# Patient Record
Sex: FEMALE | Race: WHITE | NOT HISPANIC OR LATINO | Employment: OTHER | URBAN - NONMETROPOLITAN AREA
[De-identification: names, ages, dates, MRNs, and addresses within clinical notes are randomized per-mention and may not be internally consistent; named-entity substitution may affect disease eponyms.]

---

## 2017-10-15 ENCOUNTER — APPOINTMENT (OUTPATIENT)
Dept: CT IMAGING | Facility: HOSPITAL | Age: 82
End: 2017-10-15

## 2017-10-15 ENCOUNTER — HOSPITAL ENCOUNTER (EMERGENCY)
Facility: HOSPITAL | Age: 82
Discharge: HOME | End: 2017-10-15
Attending: EMERGENCY MEDICINE | Admitting: FAMILY MEDICINE

## 2017-10-15 ENCOUNTER — APPOINTMENT (OUTPATIENT)
Dept: GENERAL RADIOLOGY | Facility: HOSPITAL | Age: 82
End: 2017-10-15

## 2017-10-15 VITALS
DIASTOLIC BLOOD PRESSURE: 83 MMHG | HEART RATE: 107 BPM | HEIGHT: 63 IN | SYSTOLIC BLOOD PRESSURE: 116 MMHG | WEIGHT: 119 LBS | TEMPERATURE: 97.6 F | BODY MASS INDEX: 21.09 KG/M2 | RESPIRATION RATE: 18 BRPM | OXYGEN SATURATION: 97 %
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
## 2017-10-15 DIAGNOSIS — M25.559 ARTHRALGIA OF HIP, UNSPECIFIED LATERALITY: Primary | ICD-10-CM

## 2017-10-15 DIAGNOSIS — I10 UNCONTROLLED HYPERTENSION: ICD-10-CM

## 2017-10-15 LAB
ALBUMIN SERPL-MCNC: 4.2 G/DL (ref 3.4–4.8)
ALBUMIN/GLOB SERPL: 1.6 G/DL (ref 1.5–2.5)
ALP SERPL-CCNC: 91 U/L (ref 35–104)
ALT SERPL W P-5'-P-CCNC: 29 U/L (ref 10–36)
ANION GAP SERPL CALCULATED.3IONS-SCNC: 4.4 MMOL/L (ref 3.6–11.2)
AST SERPL-CCNC: 29 U/L (ref 10–30)
BACTERIA UR QL AUTO: ABNORMAL /HPF
BASOPHILS # BLD AUTO: 0.02 10*3/MM3 (ref 0–0.3)
BASOPHILS NFR BLD AUTO: 0.2 % (ref 0–2)
BILIRUB SERPL-MCNC: 0.3 MG/DL (ref 0.2–1.8)
BILIRUB UR QL STRIP: NEGATIVE
BUN BLD-MCNC: 25 MG/DL (ref 7–21)
BUN/CREAT SERPL: 19.8 (ref 7–25)
CALCIUM SPEC-SCNC: 9.5 MG/DL (ref 7.7–10)
CHLORIDE SERPL-SCNC: 108 MMOL/L (ref 99–112)
CLARITY UR: CLEAR
CO2 SERPL-SCNC: 29.6 MMOL/L (ref 24.3–31.9)
COLOR UR: YELLOW
CREAT BLD-MCNC: 1.26 MG/DL (ref 0.43–1.29)
DEPRECATED RDW RBC AUTO: 44.7 FL (ref 37–54)
EOSINOPHIL # BLD AUTO: 0.94 10*3/MM3 (ref 0–0.7)
EOSINOPHIL NFR BLD AUTO: 9.6 % (ref 0–7)
ERYTHROCYTE [DISTWIDTH] IN BLOOD BY AUTOMATED COUNT: 13.5 % (ref 11.5–14.5)
GFR SERPL CREATININE-BSD FRML MDRD: 40 ML/MIN/1.73
GLOBULIN UR ELPH-MCNC: 2.7 GM/DL
GLUCOSE BLD-MCNC: 90 MG/DL (ref 70–110)
GLUCOSE UR STRIP-MCNC: NEGATIVE MG/DL
HCT VFR BLD AUTO: 41.2 % (ref 37–47)
HGB BLD-MCNC: 13.3 G/DL (ref 12–16)
HGB UR QL STRIP.AUTO: ABNORMAL
HYALINE CASTS UR QL AUTO: ABNORMAL /LPF
IMM GRANULOCYTES # BLD: 0.02 10*3/MM3 (ref 0–0.03)
IMM GRANULOCYTES NFR BLD: 0.2 % (ref 0–0.5)
KETONES UR QL STRIP: NEGATIVE
LEUKOCYTE ESTERASE UR QL STRIP.AUTO: ABNORMAL
LYMPHOCYTES # BLD AUTO: 0.88 10*3/MM3 (ref 1–3)
LYMPHOCYTES NFR BLD AUTO: 8.9 % (ref 16–46)
MCH RBC QN AUTO: 30.1 PG (ref 27–33)
MCHC RBC AUTO-ENTMCNC: 32.3 G/DL (ref 33–37)
MCV RBC AUTO: 93.2 FL (ref 80–94)
MONOCYTES # BLD AUTO: 0.46 10*3/MM3 (ref 0.1–0.9)
MONOCYTES NFR BLD AUTO: 4.7 % (ref 0–12)
NEUTROPHILS # BLD AUTO: 7.52 10*3/MM3 (ref 1.4–6.5)
NEUTROPHILS NFR BLD AUTO: 76.4 % (ref 40–75)
NITRITE UR QL STRIP: NEGATIVE
OSMOLALITY SERPL CALC.SUM OF ELEC: 287 MOSM/KG (ref 273–305)
PH UR STRIP.AUTO: 8.5 [PH] (ref 5–8)
PLATELET # BLD AUTO: 177 10*3/MM3 (ref 130–400)
PMV BLD AUTO: 10.7 FL (ref 6–10)
POTASSIUM BLD-SCNC: 4.6 MMOL/L (ref 3.5–5.3)
PROT SERPL-MCNC: 6.9 G/DL (ref 6–8)
PROT UR QL STRIP: ABNORMAL
RBC # BLD AUTO: 4.42 10*6/MM3 (ref 4.2–5.4)
RBC # UR: ABNORMAL /HPF
REF LAB TEST METHOD: ABNORMAL
SODIUM BLD-SCNC: 142 MMOL/L (ref 135–153)
SP GR UR STRIP: 1.01 (ref 1–1.03)
SQUAMOUS #/AREA URNS HPF: ABNORMAL /HPF
TROPONIN I SERPL-MCNC: <0.006 NG/ML
TROPONIN I SERPL-MCNC: <0.006 NG/ML
UROBILINOGEN UR QL STRIP: ABNORMAL
WBC NRBC COR # BLD: 9.84 10*3/MM3 (ref 4.5–12.5)
WBC UR QL AUTO: ABNORMAL /HPF

## 2017-10-15 PROCEDURE — 96375 TX/PRO/DX INJ NEW DRUG ADDON: CPT

## 2017-10-15 PROCEDURE — 93005 ELECTROCARDIOGRAM TRACING: CPT | Performed by: FAMILY MEDICINE

## 2017-10-15 PROCEDURE — 93010 ELECTROCARDIOGRAM REPORT: CPT | Performed by: INTERNAL MEDICINE

## 2017-10-15 PROCEDURE — 99285 EMERGENCY DEPT VISIT HI MDM: CPT

## 2017-10-15 PROCEDURE — 71010 HC CHEST PA OR AP: CPT

## 2017-10-15 PROCEDURE — 73523 X-RAY EXAM HIPS BI 5/> VIEWS: CPT | Performed by: RADIOLOGY

## 2017-10-15 PROCEDURE — 93005 ELECTROCARDIOGRAM TRACING: CPT | Performed by: EMERGENCY MEDICINE

## 2017-10-15 PROCEDURE — 85025 COMPLETE CBC W/AUTO DIFF WBC: CPT | Performed by: EMERGENCY MEDICINE

## 2017-10-15 PROCEDURE — 73552 X-RAY EXAM OF FEMUR 2/>: CPT

## 2017-10-15 PROCEDURE — 73523 X-RAY EXAM HIPS BI 5/> VIEWS: CPT

## 2017-10-15 PROCEDURE — 96361 HYDRATE IV INFUSION ADD-ON: CPT

## 2017-10-15 PROCEDURE — 25010000002 MORPHINE PER 10 MG: Performed by: FAMILY MEDICINE

## 2017-10-15 PROCEDURE — 73080 X-RAY EXAM OF ELBOW: CPT

## 2017-10-15 PROCEDURE — 70450 CT HEAD/BRAIN W/O DYE: CPT | Performed by: RADIOLOGY

## 2017-10-15 PROCEDURE — 36415 COLL VENOUS BLD VENIPUNCTURE: CPT

## 2017-10-15 PROCEDURE — 71010 XR CHEST 1 VW: CPT | Performed by: RADIOLOGY

## 2017-10-15 PROCEDURE — 81001 URINALYSIS AUTO W/SCOPE: CPT | Performed by: EMERGENCY MEDICINE

## 2017-10-15 PROCEDURE — 70450 CT HEAD/BRAIN W/O DYE: CPT

## 2017-10-15 PROCEDURE — 80053 COMPREHEN METABOLIC PANEL: CPT | Performed by: EMERGENCY MEDICINE

## 2017-10-15 PROCEDURE — 73080 X-RAY EXAM OF ELBOW: CPT | Performed by: RADIOLOGY

## 2017-10-15 PROCEDURE — 96374 THER/PROPH/DIAG INJ IV PUSH: CPT

## 2017-10-15 PROCEDURE — 73552 X-RAY EXAM OF FEMUR 2/>: CPT | Performed by: RADIOLOGY

## 2017-10-15 PROCEDURE — 25010000002 HYDRALAZINE PER 20 MG

## 2017-10-15 PROCEDURE — 25010000002 ONDANSETRON PER 1 MG: Performed by: FAMILY MEDICINE

## 2017-10-15 PROCEDURE — 84484 ASSAY OF TROPONIN QUANT: CPT | Performed by: EMERGENCY MEDICINE

## 2017-10-15 PROCEDURE — 84484 ASSAY OF TROPONIN QUANT: CPT | Performed by: FAMILY MEDICINE

## 2017-10-15 RX ORDER — MORPHINE SULFATE 10 MG/ML
INJECTION, SOLUTION INTRAMUSCULAR; INTRAVENOUS
Status: DISCONTINUED
Start: 2017-10-15 | End: 2017-10-16 | Stop reason: HOSPADM

## 2017-10-15 RX ORDER — LISINOPRIL AND HYDROCHLOROTHIAZIDE 20; 12.5 MG/1; MG/1
1 TABLET ORAL DAILY
COMMUNITY
End: 2022-12-13

## 2017-10-15 RX ORDER — SODIUM CHLORIDE 9 MG/ML
INJECTION, SOLUTION INTRAVENOUS
Status: COMPLETED
Start: 2017-10-15 | End: 2017-10-15

## 2017-10-15 RX ORDER — HYDRALAZINE HYDROCHLORIDE 20 MG/ML
20 INJECTION INTRAMUSCULAR; INTRAVENOUS ONCE
Status: COMPLETED | OUTPATIENT
Start: 2017-10-15 | End: 2017-10-15

## 2017-10-15 RX ORDER — ONDANSETRON 4 MG/1
4 TABLET, ORALLY DISINTEGRATING ORAL ONCE
Status: COMPLETED | OUTPATIENT
Start: 2017-10-15 | End: 2017-10-15

## 2017-10-15 RX ORDER — ONDANSETRON 4 MG/1
4 TABLET, FILM COATED ORAL EVERY 6 HOURS
Qty: 10 TABLET | Refills: 0 | Status: ON HOLD | OUTPATIENT
Start: 2017-10-15 | End: 2020-09-04

## 2017-10-15 RX ORDER — HYDROCODONE BITARTRATE AND ACETAMINOPHEN 5; 325 MG/1; MG/1
1 TABLET ORAL EVERY 6 HOURS PRN
Status: DISCONTINUED | OUTPATIENT
Start: 2017-10-15 | End: 2017-10-16 | Stop reason: HOSPADM

## 2017-10-15 RX ORDER — ENALAPRILAT 2.5 MG/2ML
INJECTION INTRAVENOUS
Status: COMPLETED
Start: 2017-10-15 | End: 2017-10-15

## 2017-10-15 RX ORDER — SODIUM CHLORIDE 0.9 % (FLUSH) 0.9 %
10 SYRINGE (ML) INJECTION AS NEEDED
Status: DISCONTINUED | OUTPATIENT
Start: 2017-10-15 | End: 2017-10-16 | Stop reason: HOSPADM

## 2017-10-15 RX ORDER — MORPHINE SULFATE 2 MG/ML
1 INJECTION, SOLUTION INTRAMUSCULAR; INTRAVENOUS ONCE
Status: COMPLETED | OUTPATIENT
Start: 2017-10-15 | End: 2017-10-15

## 2017-10-15 RX ORDER — ENALAPRILAT 2.5 MG/2ML
1.25 INJECTION INTRAVENOUS ONCE
Status: COMPLETED | OUTPATIENT
Start: 2017-10-15 | End: 2017-10-15

## 2017-10-15 RX ORDER — SODIUM CHLORIDE 9 MG/ML
INJECTION, SOLUTION INTRAVENOUS
Status: DISCONTINUED
Start: 2017-10-15 | End: 2017-10-16 | Stop reason: HOSPADM

## 2017-10-15 RX ORDER — HYDROCODONE BITARTRATE AND ACETAMINOPHEN 5; 325 MG/1; MG/1
1 TABLET ORAL EVERY 6 HOURS PRN
Qty: 12 TABLET | Refills: 0 | Status: ON HOLD | OUTPATIENT
Start: 2017-10-15 | End: 2020-09-04

## 2017-10-15 RX ORDER — ONDANSETRON 2 MG/ML
4 INJECTION INTRAMUSCULAR; INTRAVENOUS ONCE
Status: COMPLETED | OUTPATIENT
Start: 2017-10-15 | End: 2017-10-15

## 2017-10-15 RX ORDER — HYDRALAZINE HYDROCHLORIDE 20 MG/ML
INJECTION INTRAMUSCULAR; INTRAVENOUS
Status: COMPLETED
Start: 2017-10-15 | End: 2017-10-15

## 2017-10-15 RX ADMIN — HYDRALAZINE HYDROCHLORIDE 20 MG: 20 INJECTION INTRAMUSCULAR; INTRAVENOUS at 20:16

## 2017-10-15 RX ADMIN — ENALAPRILAT 1.25 MG: 1.25 INJECTION INTRAVENOUS at 19:23

## 2017-10-15 RX ADMIN — SODIUM CHLORIDE 1000 ML: 9 INJECTION, SOLUTION INTRAVENOUS at 21:05

## 2017-10-15 RX ADMIN — ONDANSETRON 4 MG: 4 TABLET, ORALLY DISINTEGRATING ORAL at 23:10

## 2017-10-15 RX ADMIN — ENALAPRILAT 1.25 MG: 2.5 INJECTION INTRAVENOUS at 19:23

## 2017-10-15 RX ADMIN — ONDANSETRON 4 MG: 2 INJECTION INTRAMUSCULAR; INTRAVENOUS at 20:33

## 2017-10-15 RX ADMIN — SODIUM CHLORIDE 1000 ML: 9 INJECTION, SOLUTION INTRAVENOUS at 21:01

## 2017-10-15 RX ADMIN — MORPHINE SULFATE 1 MG: 2 INJECTION, SOLUTION INTRAMUSCULAR; INTRAVENOUS at 20:32

## 2017-10-15 RX ADMIN — HYDROCODONE BITARTRATE AND ACETAMINOPHEN 1 TABLET: 5; 325 TABLET ORAL at 23:10

## 2017-10-15 NOTE — ED NOTES
I went in the patient's room to obtain her labs but she is not in her room at this time.     Curtis Serrano  10/15/17 [id number]

## 2017-10-15 NOTE — ED NOTES
Placed pt on bed pan at this time. Pt voided small amt of yellow urine. Pt tolerated well and denies any pain.     Shoshana Lucas RN  10/15/17 1946

## 2017-10-15 NOTE — ED PROVIDER NOTES
"Subjective   HPI Comments: Patient is a 89-year-old white female who states that she tripped on a rug and fell today, injuring the right side of her head and injuring her right hip.  She reports that she chronically has problems and pain from her left hip, but that it has not injured today.  She reports a mild injury to her right elbow, causing only mild discomfort.  She denies loss of consciousness, but states that she \"saw stars for a little while\".  She denies neck pain, back pain, chest pain, shortness of breath, abdominal pain, any other symptoms, any other injuries or complaints.  She reports that she has not had her blood pressure medicine today, but that she did take it yesterday.    Patient is a 89 y.o. female presenting with fall.   History provided by:  Patient  Fall       Review of Systems    Past Medical History:   Diagnosis Date   • Hip pain, acute, left    • Hypertension        No Known Allergies    History reviewed. No pertinent surgical history.    History reviewed. No pertinent family history.    Social History     Social History   • Marital status:      Spouse name: N/A   • Number of children: N/A   • Years of education: N/A     Social History Main Topics   • Smoking status: Never Smoker   • Smokeless tobacco: Never Used   • Alcohol use No   • Drug use: No   • Sexual activity: Defer     Other Topics Concern   • None     Social History Narrative   • None           Objective   Physical Exam    Procedures         ED Course  ED Course                  MDM  Number of Diagnoses or Management Options  Arthralgia of hip, unspecified laterality: new and does not require workup  Fall, initial encounter: new and does not require workup  Uncontrolled hypertension: new and does not require workup     Amount and/or Complexity of Data Reviewed  Clinical lab tests: ordered and reviewed  Tests in the radiology section of CPT®: ordered and reviewed  Tests in the medicine section of CPT®: reviewed and " ordered  Independent visualization of images, tracings, or specimens: yes    Risk of Complications, Morbidity, and/or Mortality  Presenting problems: moderate  Diagnostic procedures: moderate  Management options: moderate    Patient Progress  Patient progress: stable      Final diagnoses:   Arthralgia of hip, unspecified laterality   Fall, initial encounter   Uncontrolled hypertension            Romelia Youngblood DO  10/16/17 [id number]

## 2017-10-16 NOTE — ED NOTES
Repositioned pt for comfort at this time and provided warm blanket as requested by pt.      Shoshana Lucas RN  10/15/17 2043

## 2020-09-04 ENCOUNTER — APPOINTMENT (OUTPATIENT)
Dept: CT IMAGING | Facility: HOSPITAL | Age: 85
End: 2020-09-04

## 2020-09-04 ENCOUNTER — APPOINTMENT (OUTPATIENT)
Dept: MRI IMAGING | Facility: HOSPITAL | Age: 85
End: 2020-09-04

## 2020-09-04 ENCOUNTER — APPOINTMENT (OUTPATIENT)
Dept: ULTRASOUND IMAGING | Facility: HOSPITAL | Age: 85
End: 2020-09-04

## 2020-09-04 ENCOUNTER — HOSPITAL ENCOUNTER (INPATIENT)
Facility: HOSPITAL | Age: 85
LOS: 3 days | Discharge: HOME | End: 2020-09-07
Attending: EMERGENCY MEDICINE | Admitting: INTERNAL MEDICINE

## 2020-09-04 DIAGNOSIS — K85.90 ACUTE PANCREATITIS, UNSPECIFIED COMPLICATION STATUS, UNSPECIFIED PANCREATITIS TYPE: Primary | ICD-10-CM

## 2020-09-04 DIAGNOSIS — N28.89 RENAL MASS: ICD-10-CM

## 2020-09-04 LAB
6-ACETYL MORPHINE: NEGATIVE
ALBUMIN SERPL-MCNC: 3.87 G/DL (ref 3.5–5.2)
ALBUMIN/GLOB SERPL: 1.3 G/DL
ALP SERPL-CCNC: 229 U/L (ref 39–117)
ALT SERPL W P-5'-P-CCNC: 235 U/L (ref 1–33)
AMPHET+METHAMPHET UR QL: NEGATIVE
AMYLASE SERPL-CCNC: 3156 U/L (ref 28–100)
ANION GAP SERPL CALCULATED.3IONS-SCNC: 11.3 MMOL/L (ref 5–15)
APAP SERPL-MCNC: <5 MCG/ML (ref 10–30)
AST SERPL-CCNC: 749 U/L (ref 1–32)
BACTERIA UR QL AUTO: NORMAL /HPF
BARBITURATES UR QL SCN: NEGATIVE
BASOPHILS # BLD AUTO: 0.03 10*3/MM3 (ref 0–0.2)
BASOPHILS NFR BLD AUTO: 0.2 % (ref 0–1.5)
BENZODIAZ UR QL SCN: NEGATIVE
BILIRUB SERPL-MCNC: 0.5 MG/DL (ref 0–1.2)
BILIRUB UR QL STRIP: NEGATIVE
BUN SERPL-MCNC: 24 MG/DL (ref 8–23)
BUN/CREAT SERPL: 22.4 (ref 7–25)
BUPRENORPHINE SERPL-MCNC: NEGATIVE NG/ML
CALCIUM SPEC-SCNC: 9.8 MG/DL (ref 8.2–9.6)
CANNABINOIDS SERPL QL: NEGATIVE
CHLORIDE SERPL-SCNC: 102 MMOL/L (ref 98–107)
CK SERPL-CCNC: 43 U/L (ref 20–180)
CLARITY UR: CLEAR
CO2 SERPL-SCNC: 27.7 MMOL/L (ref 22–29)
COCAINE UR QL: NEGATIVE
COLOR UR: YELLOW
CREAT SERPL-MCNC: 1.07 MG/DL (ref 0.57–1)
CRP SERPL-MCNC: 0.14 MG/DL (ref 0–0.5)
D-LACTATE SERPL-SCNC: 1 MMOL/L (ref 0.5–2)
DEPRECATED RDW RBC AUTO: 44.8 FL (ref 37–54)
EOSINOPHIL # BLD AUTO: 0.36 10*3/MM3 (ref 0–0.4)
EOSINOPHIL NFR BLD AUTO: 3 % (ref 0.3–6.2)
ERYTHROCYTE [DISTWIDTH] IN BLOOD BY AUTOMATED COUNT: 13.2 % (ref 12.3–15.4)
ERYTHROCYTE [SEDIMENTATION RATE] IN BLOOD: 17 MM/HR (ref 0–30)
ETHANOL BLD-MCNC: <10 MG/DL (ref 0–10)
ETHANOL UR QL: <0.01 %
GFR SERPL CREATININE-BSD FRML MDRD: 48 ML/MIN/1.73
GLOBULIN UR ELPH-MCNC: 2.9 GM/DL
GLUCOSE BLDC GLUCOMTR-MCNC: 97 MG/DL (ref 70–130)
GLUCOSE SERPL-MCNC: 127 MG/DL (ref 65–99)
GLUCOSE UR STRIP-MCNC: NEGATIVE MG/DL
HAV IGM SERPL QL IA: NORMAL
HBA1C MFR BLD: 5.5 % (ref 4.8–5.6)
HBV CORE IGM SERPL QL IA: NORMAL
HBV SURFACE AG SERPL QL IA: NORMAL
HCT VFR BLD AUTO: 41.4 % (ref 34–46.6)
HCV AB SER DONR QL: NORMAL
HGB BLD-MCNC: 13.1 G/DL (ref 12–15.9)
HGB UR QL STRIP.AUTO: NEGATIVE
HOLD SPECIMEN: NORMAL
HYALINE CASTS UR QL AUTO: NORMAL /LPF
IMM GRANULOCYTES # BLD AUTO: 0.05 10*3/MM3 (ref 0–0.05)
IMM GRANULOCYTES NFR BLD AUTO: 0.4 % (ref 0–0.5)
INR PPP: 0.96 (ref 0.9–1.1)
KETONES UR QL STRIP: NEGATIVE
LEUKOCYTE ESTERASE UR QL STRIP.AUTO: ABNORMAL
LIPASE SERPL-CCNC: >3000 U/L (ref 13–60)
LYMPHOCYTES # BLD AUTO: 0.86 10*3/MM3 (ref 0.7–3.1)
LYMPHOCYTES NFR BLD AUTO: 7.1 % (ref 19.6–45.3)
MAGNESIUM SERPL-MCNC: 2.3 MG/DL (ref 1.7–2.3)
MCH RBC QN AUTO: 29.2 PG (ref 26.6–33)
MCHC RBC AUTO-ENTMCNC: 31.6 G/DL (ref 31.5–35.7)
MCV RBC AUTO: 92.4 FL (ref 79–97)
METHADONE UR QL SCN: NEGATIVE
MONOCYTES # BLD AUTO: 0.44 10*3/MM3 (ref 0.1–0.9)
MONOCYTES NFR BLD AUTO: 3.6 % (ref 5–12)
NEUTROPHILS NFR BLD AUTO: 10.36 10*3/MM3 (ref 1.7–7)
NEUTROPHILS NFR BLD AUTO: 85.7 % (ref 42.7–76)
NITRITE UR QL STRIP: NEGATIVE
NRBC BLD AUTO-RTO: 0 /100 WBC (ref 0–0.2)
NT-PROBNP SERPL-MCNC: 736.2 PG/ML (ref 0–1800)
OPIATES UR QL: NEGATIVE
OXYCODONE UR QL SCN: NEGATIVE
PCP UR QL SCN: NEGATIVE
PH UR STRIP.AUTO: 7.5 [PH] (ref 5–8)
PHOSPHATE SERPL-MCNC: 3.2 MG/DL (ref 2.5–4.5)
PLATELET # BLD AUTO: 205 10*3/MM3 (ref 140–450)
PMV BLD AUTO: 10.3 FL (ref 6–12)
POTASSIUM SERPL-SCNC: 4.5 MMOL/L (ref 3.5–5.2)
PROCALCITONIN SERPL-MCNC: 0.05 NG/ML (ref 0–0.25)
PROT SERPL-MCNC: 6.8 G/DL (ref 6–8.5)
PROT UR QL STRIP: ABNORMAL
PROTHROMBIN TIME: 12.6 SECONDS (ref 11.9–14.1)
RBC # BLD AUTO: 4.48 10*6/MM3 (ref 3.77–5.28)
RBC # UR: NORMAL /HPF
REF LAB TEST METHOD: NORMAL
SODIUM SERPL-SCNC: 141 MMOL/L (ref 136–145)
SP GR UR STRIP: 1.02 (ref 1–1.03)
SQUAMOUS #/AREA URNS HPF: NORMAL /HPF
TRIGL SERPL-MCNC: 76 MG/DL (ref 0–150)
TROPONIN T SERPL-MCNC: <0.01 NG/ML (ref 0–0.03)
TSH SERPL DL<=0.05 MIU/L-ACNC: 9.25 UIU/ML (ref 0.27–4.2)
UROBILINOGEN UR QL STRIP: ABNORMAL
WBC # BLD AUTO: 12.1 10*3/MM3 (ref 3.4–10.8)
WBC UR QL AUTO: NORMAL /HPF

## 2020-09-04 PROCEDURE — 25010000002 HEPARIN (PORCINE) PER 1000 UNITS: Performed by: INTERNAL MEDICINE

## 2020-09-04 PROCEDURE — 82150 ASSAY OF AMYLASE: CPT | Performed by: EMERGENCY MEDICINE

## 2020-09-04 PROCEDURE — 74176 CT ABD & PELVIS W/O CONTRAST: CPT

## 2020-09-04 PROCEDURE — 80307 DRUG TEST PRSMV CHEM ANLYZR: CPT | Performed by: EMERGENCY MEDICINE

## 2020-09-04 PROCEDURE — 76705 ECHO EXAM OF ABDOMEN: CPT | Performed by: RADIOLOGY

## 2020-09-04 PROCEDURE — 84145 PROCALCITONIN (PCT): CPT | Performed by: EMERGENCY MEDICINE

## 2020-09-04 PROCEDURE — 82550 ASSAY OF CK (CPK): CPT | Performed by: EMERGENCY MEDICINE

## 2020-09-04 PROCEDURE — 86644 CMV ANTIBODY: CPT | Performed by: INTERNAL MEDICINE

## 2020-09-04 PROCEDURE — 80074 ACUTE HEPATITIS PANEL: CPT | Performed by: EMERGENCY MEDICINE

## 2020-09-04 PROCEDURE — 83880 ASSAY OF NATRIURETIC PEPTIDE: CPT | Performed by: EMERGENCY MEDICINE

## 2020-09-04 PROCEDURE — 85025 COMPLETE CBC W/AUTO DIFF WBC: CPT | Performed by: EMERGENCY MEDICINE

## 2020-09-04 PROCEDURE — 82787 IGG 1 2 3 OR 4 EACH: CPT | Performed by: NURSE PRACTITIONER

## 2020-09-04 PROCEDURE — 93005 ELECTROCARDIOGRAM TRACING: CPT | Performed by: EMERGENCY MEDICINE

## 2020-09-04 PROCEDURE — 86645 CMV ANTIBODY IGM: CPT | Performed by: INTERNAL MEDICINE

## 2020-09-04 PROCEDURE — 84100 ASSAY OF PHOSPHORUS: CPT | Performed by: EMERGENCY MEDICINE

## 2020-09-04 PROCEDURE — 93010 ELECTROCARDIOGRAM REPORT: CPT | Performed by: INTERNAL MEDICINE

## 2020-09-04 PROCEDURE — 25010000002 MORPHINE PER 10 MG: Performed by: EMERGENCY MEDICINE

## 2020-09-04 PROCEDURE — 85610 PROTHROMBIN TIME: CPT | Performed by: EMERGENCY MEDICINE

## 2020-09-04 PROCEDURE — 99284 EMERGENCY DEPT VISIT MOD MDM: CPT

## 2020-09-04 PROCEDURE — 76705 ECHO EXAM OF ABDOMEN: CPT

## 2020-09-04 PROCEDURE — 83690 ASSAY OF LIPASE: CPT | Performed by: EMERGENCY MEDICINE

## 2020-09-04 PROCEDURE — 82962 GLUCOSE BLOOD TEST: CPT

## 2020-09-04 PROCEDURE — 94799 UNLISTED PULMONARY SVC/PX: CPT

## 2020-09-04 PROCEDURE — 83036 HEMOGLOBIN GLYCOSYLATED A1C: CPT | Performed by: NURSE PRACTITIONER

## 2020-09-04 PROCEDURE — 71250 CT THORAX DX C-: CPT

## 2020-09-04 PROCEDURE — 84484 ASSAY OF TROPONIN QUANT: CPT | Performed by: EMERGENCY MEDICINE

## 2020-09-04 PROCEDURE — 83516 IMMUNOASSAY NONANTIBODY: CPT | Performed by: NURSE PRACTITIONER

## 2020-09-04 PROCEDURE — 99223 1ST HOSP IP/OBS HIGH 75: CPT | Performed by: NURSE PRACTITIONER

## 2020-09-04 PROCEDURE — 80307 DRUG TEST PRSMV CHEM ANLYZR: CPT | Performed by: NURSE PRACTITIONER

## 2020-09-04 PROCEDURE — 74176 CT ABD & PELVIS W/O CONTRAST: CPT | Performed by: RADIOLOGY

## 2020-09-04 PROCEDURE — 83605 ASSAY OF LACTIC ACID: CPT | Performed by: EMERGENCY MEDICINE

## 2020-09-04 PROCEDURE — 81001 URINALYSIS AUTO W/SCOPE: CPT | Performed by: EMERGENCY MEDICINE

## 2020-09-04 PROCEDURE — 86664 EPSTEIN-BARR NUCLEAR ANTIGEN: CPT | Performed by: INTERNAL MEDICINE

## 2020-09-04 PROCEDURE — 86665 EPSTEIN-BARR CAPSID VCA: CPT | Performed by: INTERNAL MEDICINE

## 2020-09-04 PROCEDURE — 84443 ASSAY THYROID STIM HORMONE: CPT | Performed by: NURSE PRACTITIONER

## 2020-09-04 PROCEDURE — 80053 COMPREHEN METABOLIC PANEL: CPT | Performed by: EMERGENCY MEDICINE

## 2020-09-04 PROCEDURE — 85652 RBC SED RATE AUTOMATED: CPT | Performed by: EMERGENCY MEDICINE

## 2020-09-04 PROCEDURE — P9612 CATHETERIZE FOR URINE SPEC: HCPCS

## 2020-09-04 PROCEDURE — 71250 CT THORAX DX C-: CPT | Performed by: RADIOLOGY

## 2020-09-04 PROCEDURE — 36415 COLL VENOUS BLD VENIPUNCTURE: CPT

## 2020-09-04 PROCEDURE — 87040 BLOOD CULTURE FOR BACTERIA: CPT | Performed by: EMERGENCY MEDICINE

## 2020-09-04 PROCEDURE — 74181 MRI ABDOMEN W/O CONTRAST: CPT | Performed by: RADIOLOGY

## 2020-09-04 PROCEDURE — 84478 ASSAY OF TRIGLYCERIDES: CPT | Performed by: NURSE PRACTITIONER

## 2020-09-04 PROCEDURE — 83735 ASSAY OF MAGNESIUM: CPT | Performed by: EMERGENCY MEDICINE

## 2020-09-04 PROCEDURE — 74181 MRI ABDOMEN W/O CONTRAST: CPT

## 2020-09-04 PROCEDURE — 25010000002 ONDANSETRON PER 1 MG: Performed by: EMERGENCY MEDICINE

## 2020-09-04 PROCEDURE — 86140 C-REACTIVE PROTEIN: CPT | Performed by: EMERGENCY MEDICINE

## 2020-09-04 RX ORDER — PANTOPRAZOLE SODIUM 40 MG/10ML
40 INJECTION, POWDER, LYOPHILIZED, FOR SOLUTION INTRAVENOUS
Status: DISCONTINUED | OUTPATIENT
Start: 2020-09-04 | End: 2020-09-07

## 2020-09-04 RX ORDER — SODIUM CHLORIDE 9 MG/ML
75 INJECTION, SOLUTION INTRAVENOUS CONTINUOUS
Status: DISCONTINUED | OUTPATIENT
Start: 2020-09-04 | End: 2020-09-07 | Stop reason: HOSPADM

## 2020-09-04 RX ORDER — DEXTROSE MONOHYDRATE 25 G/50ML
25 INJECTION, SOLUTION INTRAVENOUS
Status: DISCONTINUED | OUTPATIENT
Start: 2020-09-04 | End: 2020-09-07 | Stop reason: HOSPADM

## 2020-09-04 RX ORDER — ONDANSETRON 2 MG/ML
4 INJECTION INTRAMUSCULAR; INTRAVENOUS ONCE
Status: COMPLETED | OUTPATIENT
Start: 2020-09-04 | End: 2020-09-04

## 2020-09-04 RX ORDER — SODIUM CHLORIDE 0.9 % (FLUSH) 0.9 %
10 SYRINGE (ML) INJECTION EVERY 12 HOURS SCHEDULED
Status: DISCONTINUED | OUTPATIENT
Start: 2020-09-04 | End: 2020-09-07 | Stop reason: HOSPADM

## 2020-09-04 RX ORDER — MORPHINE SULFATE 2 MG/ML
1 INJECTION, SOLUTION INTRAMUSCULAR; INTRAVENOUS
Status: DISCONTINUED | OUTPATIENT
Start: 2020-09-04 | End: 2020-09-07 | Stop reason: HOSPADM

## 2020-09-04 RX ORDER — SODIUM CHLORIDE 0.9 % (FLUSH) 0.9 %
10 SYRINGE (ML) INJECTION AS NEEDED
Status: DISCONTINUED | OUTPATIENT
Start: 2020-09-04 | End: 2020-09-07 | Stop reason: HOSPADM

## 2020-09-04 RX ORDER — HEPARIN SODIUM 5000 [USP'U]/ML
5000 INJECTION, SOLUTION INTRAVENOUS; SUBCUTANEOUS EVERY 8 HOURS SCHEDULED
Status: DISCONTINUED | OUTPATIENT
Start: 2020-09-04 | End: 2020-09-07 | Stop reason: HOSPADM

## 2020-09-04 RX ORDER — ONDANSETRON 2 MG/ML
4 INJECTION INTRAMUSCULAR; INTRAVENOUS ONCE
Status: DISCONTINUED | OUTPATIENT
Start: 2020-09-04 | End: 2020-09-07 | Stop reason: HOSPADM

## 2020-09-04 RX ORDER — NICOTINE POLACRILEX 4 MG
15 LOZENGE BUCCAL
Status: DISCONTINUED | OUTPATIENT
Start: 2020-09-04 | End: 2020-09-07 | Stop reason: HOSPADM

## 2020-09-04 RX ADMIN — SODIUM CHLORIDE 1000 ML: 9 INJECTION, SOLUTION INTRAVENOUS at 08:09

## 2020-09-04 RX ADMIN — SODIUM CHLORIDE 125 ML/HR: 9 INJECTION, SOLUTION INTRAVENOUS at 22:24

## 2020-09-04 RX ADMIN — SODIUM CHLORIDE 125 ML/HR: 9 INJECTION, SOLUTION INTRAVENOUS at 08:09

## 2020-09-04 RX ADMIN — ONDANSETRON 4 MG: 2 INJECTION INTRAMUSCULAR; INTRAVENOUS at 08:09

## 2020-09-04 RX ADMIN — HEPARIN SODIUM 5000 UNITS: 5000 INJECTION INTRAVENOUS; SUBCUTANEOUS at 14:08

## 2020-09-04 RX ADMIN — SODIUM CHLORIDE, PRESERVATIVE FREE 10 ML: 5 INJECTION INTRAVENOUS at 20:05

## 2020-09-04 RX ADMIN — HEPARIN SODIUM 5000 UNITS: 5000 INJECTION INTRAVENOUS; SUBCUTANEOUS at 20:05

## 2020-09-04 RX ADMIN — SODIUM CHLORIDE, PRESERVATIVE FREE 10 ML: 5 INJECTION INTRAVENOUS at 14:08

## 2020-09-04 RX ADMIN — MORPHINE SULFATE 1 MG: 2 INJECTION, SOLUTION INTRAMUSCULAR; INTRAVENOUS at 08:09

## 2020-09-04 RX ADMIN — PANTOPRAZOLE SODIUM 40 MG: 40 INJECTION, POWDER, FOR SOLUTION INTRAVENOUS at 16:01

## 2020-09-04 NOTE — ED PROVIDER NOTES
Subjective   89-year-old female in the emergency department with nausea, vomiting, and abdominal pain.  Started earlier this morning patient came to the emergency department for further evaluation.  Denies shortness of breath or chest pain.  Having a past medical history of hip pain and hypertension.      History provided by:  Patient   used: No        Review of Systems   Constitutional: Negative for activity change, appetite change, chills, diaphoresis, fatigue and fever.   HENT: Negative for congestion, ear pain and sore throat.    Eyes: Negative for redness.   Respiratory: Negative for cough, chest tightness, shortness of breath and wheezing.    Cardiovascular: Negative for chest pain, palpitations and leg swelling.   Gastrointestinal: Positive for abdominal pain, nausea and vomiting. Negative for diarrhea.   Genitourinary: Negative for dysuria and urgency.   Musculoskeletal: Negative for arthralgias, back pain, myalgias and neck pain.   Skin: Negative for pallor, rash and wound.   Neurological: Negative for dizziness, speech difficulty, weakness and headaches.   Psychiatric/Behavioral: Negative for agitation, behavioral problems, confusion and decreased concentration.   All other systems reviewed and are negative.      Past Medical History:   Diagnosis Date   • Hip pain, acute, left    • Hypertension        No Known Allergies    History reviewed. No pertinent surgical history.    No family history on file.    Social History     Socioeconomic History   • Marital status:      Spouse name: Not on file   • Number of children: Not on file   • Years of education: Not on file   • Highest education level: Not on file   Tobacco Use   • Smoking status: Never Smoker   • Smokeless tobacco: Never Used   Substance and Sexual Activity   • Alcohol use: No   • Drug use: No   • Sexual activity: Defer           Objective   Physical Exam   Constitutional: She is oriented to person, place, and time. She  appears well-developed and well-nourished.  Non-toxic appearance. She appears ill. No distress.   HENT:   Head: Normocephalic and atraumatic.   Right Ear: External ear normal.   Left Ear: External ear normal.   Nose: Nose normal.   Mouth/Throat: Oropharynx is clear and moist and mucous membranes are normal. No oropharyngeal exudate. No tonsillar exudate.   Eyes: Pupils are equal, round, and reactive to light. Conjunctivae, EOM and lids are normal.   Neck: Normal range of motion and full passive range of motion without pain. Neck supple. No thyromegaly present.   Cardiovascular: Normal rate, regular rhythm, S1 normal, S2 normal, normal heart sounds, intact distal pulses and normal pulses.   Pulmonary/Chest: Effort normal and breath sounds normal. No tachypnea. No respiratory distress. She has no decreased breath sounds. She has no wheezes. She has no rales. She exhibits no tenderness.   Abdominal: Soft. Normal appearance and bowel sounds are normal. She exhibits no distension. There is tenderness. There is no rebound and no guarding.   Musculoskeletal: Normal range of motion. She exhibits no edema, tenderness or deformity.   Lymphadenopathy:     She has no cervical adenopathy.   Neurological: She is alert and oriented to person, place, and time. She has normal strength. No cranial nerve deficit or sensory deficit. GCS eye subscore is 4. GCS verbal subscore is 5. GCS motor subscore is 6.   Skin: Skin is warm, dry and intact. No rash noted. She is not diaphoretic. No erythema. No pallor.   Psychiatric: She has a normal mood and affect. Her speech is normal and behavior is normal. Judgment and thought content normal. Cognition and memory are normal.   Nursing note and vitals reviewed.      Procedures  US Abdomen Limited   Final Result       1. Heterogeneous liver echotexture indicative of underlying   hepatocellular disease. Correlate with laboratory data.   2. Concentric gallbladder wall thickening without shadowing  stones. This   is commonly seen in the setting of underlying liver disease but   acalculus cholecystitis should be excluded clinically.       This report was finalized on 9/4/2020 10:40 AM by Dr. Triston Reveles MD.          CT Abdomen Pelvis Without Contrast   Final Result   1. Inflammation which is probably centered around the pancreas.   Correlate for pancreatitis. No pseudocyst or hematoma identified.   2. Minimal fluid around the gallbladder which is mildly distended.   Secondary inflammation considered. Cholecystitis not excluded.   3. Mild ileus. No bowel obstruction.   4. 3.4 cm mass left kidney. Contrast-enhanced imaging recommended.   5. Atherosclerosis. Other nonacute findings above.       This report was finalized on 9/4/2020 8:47 AM by Dr. Tritson Reveles MD.          CT Chest Without Contrast   Final Result   1. Cardiomegaly and coronary artery calcifications.   2. Chronic interstitial lung changes. Minimal basilar atelectasis.   3. Bilateral pulmonary nodules. Largest is 6.5 mm. 3-month follow-up   chest CT.   4. Indeterminant upper pole left renal mass. Contrast-enhanced imaging   is recommended.   5. Otherwise no acute thoracic findings identified.       This report was finalized on 9/4/2020 8:42 AM by Dr. Triston Reveles MD.            Results for orders placed or performed during the hospital encounter of 09/04/20   Comprehensive Metabolic Panel   Result Value Ref Range    Glucose 127 (H) 65 - 99 mg/dL    BUN 24 (H) 8 - 23 mg/dL    Creatinine 1.07 (H) 0.57 - 1.00 mg/dL    Sodium 141 136 - 145 mmol/L    Potassium 4.5 3.5 - 5.2 mmol/L    Chloride 102 98 - 107 mmol/L    CO2 27.7 22.0 - 29.0 mmol/L    Calcium 9.8 (H) 8.2 - 9.6 mg/dL    Total Protein 6.8 6.0 - 8.5 g/dL    Albumin 3.87 3.50 - 5.20 g/dL    ALT (SGPT) 235 (H) 1 - 33 U/L    AST (SGOT) 749 (H) 1 - 32 U/L    Alkaline Phosphatase 229 (H) 39 - 117 U/L    Total Bilirubin 0.5 0.0 - 1.2 mg/dL    eGFR Non African Amer 48 (L) >60 mL/min/1.73     Globulin 2.9 gm/dL    A/G Ratio 1.3 g/dL    BUN/Creatinine Ratio 22.4 7.0 - 25.0    Anion Gap 11.3 5.0 - 15.0 mmol/L   Protime-INR   Result Value Ref Range    Protime 12.6 11.9 - 14.1 Seconds    INR 0.96 0.90 - 1.10   Urinalysis With Culture If Indicated - Urine, Clean Catch   Result Value Ref Range    Color, UA Yellow Yellow, Straw    Appearance, UA Clear Clear    pH, UA 7.5 5.0 - 8.0    Specific Gravity, UA 1.016 1.005 - 1.030    Glucose, UA Negative Negative    Ketones, UA Negative Negative    Bilirubin, UA Negative Negative    Blood, UA Negative Negative    Protein, UA 30 mg/dL (1+) (A) Negative    Leuk Esterase, UA Trace (A) Negative    Nitrite, UA Negative Negative    Urobilinogen, UA 1.0 E.U./dL 0.2 - 1.0 E.U./dL   Troponin   Result Value Ref Range    Troponin T <0.010 0.000 - 0.030 ng/mL   Lactic Acid, Plasma   Result Value Ref Range    Lactate 1.0 0.5 - 2.0 mmol/L   BNP   Result Value Ref Range    proBNP 736.2 0.0-1,800.0 pg/mL   Sedimentation Rate   Result Value Ref Range    Sed Rate 17 0 - 30 mm/hr   C-reactive Protein   Result Value Ref Range    C-Reactive Protein 0.14 0.00 - 0.50 mg/dL   Phosphorus   Result Value Ref Range    Phosphorus 3.2 2.5 - 4.5 mg/dL   Magnesium   Result Value Ref Range    Magnesium 2.3 1.7 - 2.3 mg/dL   CK   Result Value Ref Range    Creatine Kinase 43 20 - 180 U/L   Urine Drug Screen - Urine, Clean Catch   Result Value Ref Range    Amphetamine Screen, Urine Negative Negative    Barbiturates Screen, Urine Negative Negative    Benzodiazepine Screen, Urine Negative Negative    Cocaine Screen, Urine Negative Negative    Methadone Screen, Urine Negative Negative    Opiate Screen Negative Negative    Phencyclidine (PCP), Urine Negative Negative    THC, Screen, Urine Negative Negative    6-ACETYL MORPHINE Negative Negative    Buprenorphine, Screen, Urine Negative Negative    Oxycodone Screen, Urine Negative Negative   CBC Auto Differential   Result Value Ref Range    WBC 12.10 (H) 3.40  - 10.80 10*3/mm3    RBC 4.48 3.77 - 5.28 10*6/mm3    Hemoglobin 13.1 12.0 - 15.9 g/dL    Hematocrit 41.4 34.0 - 46.6 %    MCV 92.4 79.0 - 97.0 fL    MCH 29.2 26.6 - 33.0 pg    MCHC 31.6 31.5 - 35.7 g/dL    RDW 13.2 12.3 - 15.4 %    RDW-SD 44.8 37.0 - 54.0 fl    MPV 10.3 6.0 - 12.0 fL    Platelets 205 140 - 450 10*3/mm3    Neutrophil % 85.7 (H) 42.7 - 76.0 %    Lymphocyte % 7.1 (L) 19.6 - 45.3 %    Monocyte % 3.6 (L) 5.0 - 12.0 %    Eosinophil % 3.0 0.3 - 6.2 %    Basophil % 0.2 0.0 - 1.5 %    Immature Grans % 0.4 0.0 - 0.5 %    Neutrophils, Absolute 10.36 (H) 1.70 - 7.00 10*3/mm3    Lymphocytes, Absolute 0.86 0.70 - 3.10 10*3/mm3    Monocytes, Absolute 0.44 0.10 - 0.90 10*3/mm3    Eosinophils, Absolute 0.36 0.00 - 0.40 10*3/mm3    Basophils, Absolute 0.03 0.00 - 0.20 10*3/mm3    Immature Grans, Absolute 0.05 0.00 - 0.05 10*3/mm3    nRBC 0.0 0.0 - 0.2 /100 WBC   Urinalysis, Microscopic Only - Urine, Clean Catch   Result Value Ref Range    RBC, UA 0-2 None Seen, 0-2 /HPF    WBC, UA 0-2 None Seen, 0-2 /HPF    Bacteria, UA None Seen None Seen /HPF    Squamous Epithelial Cells, UA 0-2 None Seen, 0-2 /HPF    Hyaline Casts, UA None Seen None Seen /LPF    Methodology Automated Microscopy    Hepatitis Panel, Acute   Result Value Ref Range    Hepatitis B Surface Ag Non-Reactive Non-Reactive    Hep A IgM Non-Reactive Non-Reactive    Hep B C IgM Non-Reactive Non-Reactive    Hepatitis C Ab Non-Reactive Non-Reactive   Lipase   Result Value Ref Range    Lipase >3,000 (H) 13 - 60 U/L   Amylase   Result Value Ref Range    Amylase 3,156 (H) 28 - 100 U/L                ED Course  ED Course as of Sep 04 1205   Fri Sep 04, 2020   0736 Endorsed to Dr. Gordon.    [ES]   9313 Normal sinus rhythm  Left axis deviation  Low voltage QRS  Inferior infarct (cited on or before 15-OCT-2017)  Abnormal ECG  When compared with ECG of 15-OCT-2017 21:03,  (RBBB and left anterior fascicular block) is no longer present  Questionable change in initial  forces of Inferior leads  Vent. rate 88 BPM  NC interval 142 ms  QRS duration 78 ms  QT/QTc 350/423 ms  P-R-T axes 21 -38 5   ECG 12 Lead [ES]   1147 I assumed patient's care from Dr. Corona this morning at shift change.  Please see his documentation above.  Patient's emergency department stay has not been complicated, she has been much more comfortable with IV analgesics and antiemetics.  Discussed the case with Dr. Brasher.  He is admitting patient to the hospitalist service.    [CM]      ED Course User Index  [CM] Lawrence Gordon MD  [ES] Pedro Luis Arana MD                                           MDM  Number of Diagnoses or Management Options     Amount and/or Complexity of Data Reviewed  Clinical lab tests: reviewed and ordered  Tests in the radiology section of CPT®: ordered and reviewed  Tests in the medicine section of CPT®: reviewed and ordered  Review and summarize past medical records: yes  Discuss the patient with other providers: yes  Independent visualization of images, tracings, or specimens: yes    Risk of Complications, Morbidity, and/or Mortality  Presenting problems: moderate  Diagnostic procedures: moderate  Management options: moderate    Patient Progress  Patient progress: stable      Final diagnoses:   Acute pancreatitis, unspecified complication status, unspecified pancreatitis type             Please note that portions of this note were completed with a voice recognition program.        Lawrence Gordon MD  09/04/20 [id number]

## 2020-09-04 NOTE — H&P
[facility] Medicine Services  History & Physical          Patient Identification:  Name:  Ashlee Go  Age:  89 y.o.  Sex:  female  :  1925  MRN:  [id number]   Visit Number:  [id number]  Primary Care Physician:  Yuridia Levy APRN    I have seen the patient in conjunction with [facility] and I agree with the following statements:     Subjective     Chief complaint: abdominal pain    History of presenting illness:    Mrs. Go is a 89 year old who presented to [facility] ED on 2020 for abdominal pain and vomiting that woke her up around 1:30am. She reports having felt fine yesterday. She has vomited around 3 times. She has had pancreatitis in the past around 1-2 years ago and was treated at [facility] at that time. She denies any fevers, chills, no diarrhea, no cough, dyspnea or sore throat. She has not had any recent treatment with antibiotics and no hx of gallstones.     Her work up in the ED showed a CK Of 43, troponin <0.010, glucose 127, sodium 141, potassium 4.5, , Creatinine 1.07. BUN 24, calcium 9.8, , , Total bilirubin 0.5, phosphorous 3.2, amylase 3156, lipase >3,0000, CRP 0.14, lactate 1.0, mag 2.3, WBC 12.10, INR 0.96. Acute hepatitis negative. UDS negative, blood cultures x2 collected. CT of the chest without contrast showed cardiomegaly and coronary artery calcifications, chronic interstitial lung changes, minimal basilar atelectasis, bilateral pulmonary nodules, largest is 6.5mm, upper pole left renal mass.     Her treatment in the ED included 1 liter NS bolus and a total of 8mg of Zofran. Her v/s in the ED were temp 97.4, HR 85-98, /91, 98% on room air.     Her past medical history is significant for hypertension, she has never had surgery. She denies any history of tobacco use, no hx of drug or ETOH use. Her daughter, Bella is at bedside.        ---------------------------------------------------------------------------------------------------------------------   Review of Systems   Constitutional: Negative for chills, diaphoresis, fatigue and fever.   HENT: Negative for congestion and sore throat.    Respiratory: Negative for cough and shortness of breath.    Cardiovascular: Negative for chest pain and leg swelling.   Gastrointestinal: Positive for abdominal pain, nausea and vomiting. Negative for abdominal distention and constipation.   Genitourinary: Negative for difficulty urinating.   Musculoskeletal: Negative for arthralgias and myalgias.   Skin: Negative for color change.   Neurological: Negative for dizziness and weakness.   Hematological: Negative for adenopathy.   Psychiatric/Behavioral: Negative for agitation, behavioral problems and confusion.      ---------------------------------------------------------------------------------------------------------------------   Past Medical History:   Diagnosis Date   • Hip pain, acute, left    • Hypertension      History reviewed. No pertinent surgical history.  No family history on file.  Social History     Socioeconomic History   • Marital status:      Spouse name: Not on file   • Number of children: Not on file   • Years of education: Not on file   • Highest education level: Not on file   Tobacco Use   • Smoking status: Never Smoker   • Smokeless tobacco: Never Used   Substance and Sexual Activity   • Alcohol use: No   • Drug use: No   • Sexual activity: Defer     ---------------------------------------------------------------------------------------------------------------------   Allergies:  Patient has no known allergies.  ---------------------------------------------------------------------------------------------------------------------     Medications below are reported home medications pulling from within the system; at this time, these medications have not been reconciled unless  otherwise specified and are in the verification process for further verifcation as current home medications.    Prior to Admission Medications     Prescriptions Last Dose Informant Patient Reported? Taking?    HYDROcodone-acetaminophen (NORCO) 5-325 MG per tablet   No No    Take 1 tablet by mouth Every 6 (Six) Hours As Needed for Mild Pain .    lisinopril-hydrochlorothiazide (PRINZIDE,ZESTORETIC) 20-12.5 MG per tablet   Yes No    Take 1 tablet by mouth Daily.    ondansetron (ZOFRAN) 4 MG tablet   No No    Take 1 tablet by mouth Every 6 (Six) Hours. PRN Nausea/vomiting        Hospital Scheduled Meds:    ondansetron 4 mg Intravenous Once       sodium chloride 125 mL/hr Last Rate: 125 mL/hr (09/04/20 1109)     ---------------------------------------------------------------------------------------------------------------------   Objective       Vital Signs:  Temp:  [97.4 °F (36.3 °C)] 97.4 °F (36.3 °C)  Heart Rate:  [] 105  Resp:  [18] 18  BP: (135-187)/(75-97) 161/97      09/04/20  0705   Weight: 45.4 kg (100 lb)     Body mass index is 17.71 kg/m².  ---------------------------------------------------------------------------------------------------------------------       Physical Exam    Physical Exam:  Constitutional:  Elderly female in no acute distress  HENT:  Head: Normocephalic and atraumatic.  Mouth:  Moist mucous membranes.    Eyes:  Conjunctivae and EOM are normal.  Pupils are equal, round, and reactive to light.  No scleral icterus.  Neck:  Neck supple.  No JVD present.    Cardiovascular:  Normal rate, regular rhythm.  with no murmur.  Pulmonary/Chest:  No respiratory distress, no wheezes, no crackles, with normal breath sounds and good air movement.  Abdominal:  Soft tender in all quadrants, no distention, active bowel sounds  Musculoskeletal:  No edema, no tenderness, and no deformity.  No red or swollen joints anywhere.    Neurological:  Alert and oriented to person, place, and time.  No cranial nerve  deficit.  No tongue deviation.  No facial droop.  No slurred speech.   Skin:  Skin is warm and dry.  No rash noted.  No pallor.   Psychiatric:  Normal mood and affect.  Behavior is normal.  Judgment and thought content normal.   Peripheral vascular:  No edema and strong pulses on all 4 extremities.  ---------------------------------------------------------------------------------------------------------------------  EKG:         ---------------------------------------------------------------------------------------------------------------------   Results from last 7 days   Lab Units 09/04/20  0658   CRP mg/dL 0.14   LACTATE mmol/L 1.0   WBC 10*3/mm3 12.10*   HEMOGLOBIN g/dL 13.1   HEMATOCRIT % 41.4   MCV fL 92.4   MCHC g/dL 31.6   PLATELETS 10*3/mm3 205   INR  0.96         Results from last 7 days   Lab Units 09/04/20  0658   SODIUM mmol/L 141   POTASSIUM mmol/L 4.5   MAGNESIUM mg/dL 2.3   CHLORIDE mmol/L 102   CO2 mmol/L 27.7   BUN mg/dL 24*   CREATININE mg/dL 1.07*   EGFR IF NONAFRICN AM mL/min/1.73 48*   CALCIUM mg/dL 9.8*   GLUCOSE mg/dL 127*   ALBUMIN g/dL 3.87   BILIRUBIN mg/dL 0.5   ALK PHOS U/L 229*   AST (SGOT) U/L 749*   ALT (SGPT) U/L 235*   Estimated Creatinine Clearance: 22.5 mL/min (A) (by C-G formula based on SCr of 1.07 mg/dL (H)).  No results found for: AMMONIA  Results from last 7 days   Lab Units 09/04/20  0658   CK TOTAL U/L 43   TROPONIN T ng/mL <0.010     Results from last 7 days   Lab Units 09/04/20  0658   PROBNP pg/mL 736.2     Lab Results   Component Value Date    HGBA1C 5.50 09/04/2020     No results found for: TSH, FREET4  No results found for: PREGTESTUR, PREGSERUM, HCG, HCGQUANT  Pain Management Panel     Pain Management Panel Latest Ref Rng & Units 9/4/2020    AMPHETAMINES SCREEN, URINE Negative Negative    BARBITURATES SCREEN Negative Negative    BENZODIAZEPINE SCREEN, URINE Negative Negative    BUPRENORPHINEUR Negative Negative    COCAINE SCREEN, URINE Negative Negative    METHADONE  SCREEN, URINE Negative Negative        No results found for: BLOODCX  No results found for: URINECX  No results found for: WOUNDCX  No results found for: STOOLCX      ---------------------------------------------------------------------------------------------------------------------  Imaging Results (Last 7 Days)     Procedure Component Value Units Date/Time    US Abdomen Limited [[id number]] Collected:  09/04/20 1038     Updated:  09/04/20 1042    Narrative:       EXAMINATION: US ABDOMEN LIMITED-      CLINICAL INDICATION:Abdominal pain, elevated liver enzymes     COMPARISON: None      TECHNIQUE: Sonographic imaging obtained in transverse and sagittal  planes of the liver. Color Doppler implemented.     FINDINGS:   Heterogeneous liver echotexture which can be seen with underlying  hepatocellular disease.  There is abnormal wall thickening of the gallbladder with  pericholecystic fluid but without shadowing stones. Favor secondary wall  thickening from liver disease. Acalculus cholecystitis felt less likely.  No intrahepatic biliary dilatation noted.  Liver size appears within normal limits.  No perihepatic ascites identified.  Common bile duct is within normal limits and is:3 mm.          Impression:          1. Heterogeneous liver echotexture indicative of underlying  hepatocellular disease. Correlate with laboratory data.  2. Concentric gallbladder wall thickening without shadowing stones. This  is commonly seen in the setting of underlying liver disease but  acalculus cholecystitis should be excluded clinically.     This report was finalized on 9/4/2020 10:40 AM by Dr. Triston Reveles MD.       CT Abdomen Pelvis Without Contrast [[id number]] Collected:  09/04/20 0844     Updated:  09/04/20 0849    Narrative:       EXAM: CT ABDOMEN PELVIS WO CONTRAST-            TECHNIQUE: Multiple axial CT images were obtained from lung bases  through pubic symphysis WITHOUT administration of IV contrast.  Reformatted images in the  coronal and/or sagittal plane(s) were  generated from the axial data set to facilitate diagnostic accuracy  and/or surgical planning.  Oral Contrast:NONE.     Radiation dose reduction techniques were utilized per ALARA protocol.  Automated exposure control was initiated through either or [employer] or  [employer] software packages by  protocol.       DOSE: 548.23 mGy.cm     Clinical information  Pain, unspecified      Comparison  COMPARISON: None     FINDINGS:     Lower thorax: Cardiomegaly and coronary artery calcifications with  minimal basilar atelectasis.     Abdomen:     Liver: Homogeneous. No focal hepatic mass or ductal dilatation.  Gallbladder: Distended gallbladder noted with minimal fluid around the  gallbladder.  Pancreas: Edematous appearance of the pancreas with fluid surrounding  the pancreatic bed consistent with acute pancreatitis.  Spleen: Homogeneous. No splenomegaly.  Adrenals: No mass.  Kidneys/ureters: 3.4 cm mixed density mass upper pole left kidney for  which further assessment with contrast-enhanced imaging is recommended.  GI tract: Mild constipation. Mild ileus but no bowel obstruction  identified.  Peritoneum: No free air. No free fluid or loculated fluid collections.  Mesentery: Unremarkable.  Lymph nodes: No lymphadenopathy.  Vasculature: Moderate atherosclerosis.  Abdominal wall: No focal hernia or mass.     Other: None.     Pelvis:     Bladder: Distended urinary bladder without wall thickening.  Reproductive: Unremarkable as visualized.  Appendix: Normal appendix.     Bones: Osteoporosis and degenerative changes lumbar spine but no acute  bony findings identified.       Impression:       1. Inflammation which is probably centered around the pancreas.  Correlate for pancreatitis. No pseudocyst or hematoma identified.  2. Minimal fluid around the gallbladder which is mildly distended.  Secondary inflammation considered. Cholecystitis not excluded.  3. Mild ileus. No bowel  obstruction.  4. 3.4 cm mass left kidney. Contrast-enhanced imaging recommended.  5. Atherosclerosis. Other nonacute findings above.     This report was finalized on 9/4/2020 8:47 AM by Dr. Triston Reveles MD.       CT Chest Without Contrast [[id number]] Collected:  09/04/20 0839     Updated:  09/04/20 0844    Narrative:       EXAM: CT CHEST WO CONTRAST-            CLINICAL INDICATION:Shortness of breath      COMPARISON: NONE.     TECHNIQUE: Multiple axial CT images were obtained from lung apex through  upper abdomen WITHOUT administration of IV contrast. Reformatted images  in the coronal and/or sagittal plane(s) were generated from the axial  data set to facilitate diagnostic accuracy and/or surgical planning.     Radiation dose reduction techniques were utilized per ALARA protocol.  Automated exposure control was initiated through either or [employer] or  [employer]Right software packages by  protocol.    DOSE (DLP mGy-cm): 317.46 mGy.cm        FINDINGS:     Lungs: Chronic interstitial lung changes. 5.3 mm pulmonary nodule right  upper lobe. Dependent basilar atelectasis bilateral lower lobes. 4.4 mm  pulmonary nodule right lower lobe. 6.5 mm pulmonary nodule left lower  lobe which is pleural-based. Calcified granuloma left upper lobe.  Solid-appearing mass upper pole left kidney that is approximately 3.4  cm.  Heart: Marked cardiomegaly is noted. Moderate coronary artery  calcifications.  Pericardium: No effusion.  Mediastinum: No masses. No enlarged lymph nodes.  No fluid collections.  Pleura: No pleural effusion. No pleural mass or abnormal calcification.  No pneumothorax.  Major airways: Clear. No intrinsic mass.  Vasculature: Atherosclerosis thoracic aorta without significant  aneurysmal dilatation identified.  Visualized upper abdomen:The upper abdomen is unremarkable as  visualized.  Other: None.  Bones: No acute bony abnormality.       Impression:       1. Cardiomegaly and coronary artery  calcifications.  2. Chronic interstitial lung changes. Minimal basilar atelectasis.  3. Bilateral pulmonary nodules. Largest is 6.5 mm. 3-month follow-up  chest CT.  4. Indeterminant upper pole left renal mass. Contrast-enhanced imaging  is recommended.  5. Otherwise no acute thoracic findings identified.     This report was finalized on 9/4/2020 8:42 AM by Dr. Triston Reveles MD.             Cultures:2 sets of blood cultures collected in the ED    ---------------------------------------------------------------------------------------------------------------------  Assessment / Plan       Assessment and Plan:    SIRS r/t Acute Pancreatitis (WBC 12.10, HR >90 ): blood cultures collected in the ED, lipase is >3000, CT of the abdomen and pelvis did not appreciate any pseudocyst or hematoma, no mention of necrotizing pancreatitis. Ethanol level and tylenol levels are negative, she does take lisinopril and HCTZ for her blood pressure, her calcium is mildly elevated at 9.8. She denies NSAID use, possibly drug induced. Triglyceride level pending. NPO, give IV fluids, repeat labs in the am. Continue with plan as outlined.      Elevated Transaminases: , , alkaline phosphatase is 229,  fluid around the GB and mildly distended, cholecystitis not excluded. Total bili is normal at 0.5, CK is WNL, acute hepatitis is negative. Tylenol and ETOH level pending. Limited Abdominal US showed heterogeneous liver echotexture indicative of underlying hepatocellular disease, concentric GB wall thickening without shadowing stones. IGG4, AMA, and anti smooth muscle antibiody and MRI abdomen without contrast MCRP ordered.     Mild ileus: as note don CT of the abd/pelvis on 9/4/2020, no obstruction noted, monitor.     Bilateral Pulmonary Nodules, new: as noted on CT of the chest, largest measuring 6.5mm. Monitor, will need continued follow up outpatient.     Left renal mass, new: 3.4cm.  Continue with plan as outlined.     Mild  Hyperglycemia: glucose is 127, A1c added.     Likely CKD stage III: creatinine is 1.07, giving IV fluids monitor and repeat in the am.     Activity: up with assistance   Precautions: falls   Diet: NPO accu checks ordered, IV fluids for hydration   DVT prophylaxis: Heparin TID SQ  GI prophylaxis: Protonix 40mg IV daily     Code status: Full     Patient is high risk for the following reasons: SIRS r/t Acute Pancreatitis, Advanced age    Mini Guevara, APRN  09/04/20  13:05  ---------------------------------------------------------------------------------------------------------------------

## 2020-09-04 NOTE — PLAN OF CARE
Problem: Patient Care Overview  Goal: Plan of Care Review  Outcome: Ongoing (interventions implemented as appropriate)  Flowsheets (Taken 9/4/2020 6143)  Progress: improving  Plan of Care Reviewed With: patient  Outcome Summary: Pt tolerated clear liquid diet for dinner this evening. Up to bedside commode reported per pt daughter. Vital signs stable. Afebrile. Will continue to monitor.

## 2020-09-05 ENCOUNTER — APPOINTMENT (OUTPATIENT)
Dept: CT IMAGING | Facility: HOSPITAL | Age: 85
End: 2020-09-05

## 2020-09-05 LAB
ACTIN IGG SERPL-ACNC: 21 UNITS (ref 0–19)
ALBUMIN SERPL-MCNC: 2.89 G/DL (ref 3.5–5.2)
ALBUMIN/GLOB SERPL: 1.3 G/DL
ALP SERPL-CCNC: 153 U/L (ref 39–117)
ALT SERPL W P-5'-P-CCNC: 335 U/L (ref 1–33)
ANION GAP SERPL CALCULATED.3IONS-SCNC: 8.9 MMOL/L (ref 5–15)
AST SERPL-CCNC: 347 U/L (ref 1–32)
BASOPHILS # BLD AUTO: 0.03 10*3/MM3 (ref 0–0.2)
BASOPHILS NFR BLD AUTO: 0.5 % (ref 0–1.5)
BILIRUB SERPL-MCNC: 0.5 MG/DL (ref 0–1.2)
BUN SERPL-MCNC: 18 MG/DL (ref 8–23)
BUN/CREAT SERPL: 21.7 (ref 7–25)
CALCIUM SPEC-SCNC: 8.2 MG/DL (ref 8.2–9.6)
CHLORIDE SERPL-SCNC: 108 MMOL/L (ref 98–107)
CO2 SERPL-SCNC: 21.1 MMOL/L (ref 22–29)
CREAT SERPL-MCNC: 0.83 MG/DL (ref 0.57–1)
CRP SERPL-MCNC: 5.27 MG/DL (ref 0–0.5)
DEPRECATED MITOCHONDRIA M2 IGG SER-ACNC: <20 UNITS (ref 0–20)
DEPRECATED RDW RBC AUTO: 46 FL (ref 37–54)
EOSINOPHIL # BLD AUTO: 0.55 10*3/MM3 (ref 0–0.4)
EOSINOPHIL NFR BLD AUTO: 9.5 % (ref 0.3–6.2)
ERYTHROCYTE [DISTWIDTH] IN BLOOD BY AUTOMATED COUNT: 13.3 % (ref 12.3–15.4)
GFR SERPL CREATININE-BSD FRML MDRD: 64 ML/MIN/1.73
GLOBULIN UR ELPH-MCNC: 2.2 GM/DL
GLUCOSE BLDC GLUCOMTR-MCNC: 134 MG/DL (ref 70–130)
GLUCOSE BLDC GLUCOMTR-MCNC: 69 MG/DL (ref 70–130)
GLUCOSE BLDC GLUCOMTR-MCNC: 73 MG/DL (ref 70–130)
GLUCOSE BLDC GLUCOMTR-MCNC: 82 MG/DL (ref 70–130)
GLUCOSE BLDC GLUCOMTR-MCNC: 94 MG/DL (ref 70–130)
GLUCOSE SERPL-MCNC: 100 MG/DL (ref 65–99)
HCT VFR BLD AUTO: 34.3 % (ref 34–46.6)
HGB BLD-MCNC: 10.8 G/DL (ref 12–15.9)
IMM GRANULOCYTES # BLD AUTO: 0.01 10*3/MM3 (ref 0–0.05)
IMM GRANULOCYTES NFR BLD AUTO: 0.2 % (ref 0–0.5)
LIPASE SERPL-CCNC: 882 U/L (ref 13–60)
LYMPHOCYTES # BLD AUTO: 1.1 10*3/MM3 (ref 0.7–3.1)
LYMPHOCYTES NFR BLD AUTO: 19.1 % (ref 19.6–45.3)
MCH RBC QN AUTO: 29.4 PG (ref 26.6–33)
MCHC RBC AUTO-ENTMCNC: 31.5 G/DL (ref 31.5–35.7)
MCV RBC AUTO: 93.5 FL (ref 79–97)
MONOCYTES # BLD AUTO: 0.48 10*3/MM3 (ref 0.1–0.9)
MONOCYTES NFR BLD AUTO: 8.3 % (ref 5–12)
NEUTROPHILS NFR BLD AUTO: 3.59 10*3/MM3 (ref 1.7–7)
NEUTROPHILS NFR BLD AUTO: 62.4 % (ref 42.7–76)
NRBC BLD AUTO-RTO: 0 /100 WBC (ref 0–0.2)
PLATELET # BLD AUTO: 156 10*3/MM3 (ref 140–450)
PMV BLD AUTO: 10.2 FL (ref 6–12)
POTASSIUM SERPL-SCNC: 4.2 MMOL/L (ref 3.5–5.2)
PROT SERPL-MCNC: 5.1 G/DL (ref 6–8.5)
RBC # BLD AUTO: 3.67 10*6/MM3 (ref 3.77–5.28)
SODIUM SERPL-SCNC: 138 MMOL/L (ref 136–145)
T4 FREE SERPL-MCNC: 1.04 NG/DL (ref 0.93–1.7)
WBC # BLD AUTO: 5.76 10*3/MM3 (ref 3.4–10.8)

## 2020-09-05 PROCEDURE — 84439 ASSAY OF FREE THYROXINE: CPT | Performed by: INTERNAL MEDICINE

## 2020-09-05 PROCEDURE — 83690 ASSAY OF LIPASE: CPT | Performed by: NURSE PRACTITIONER

## 2020-09-05 PROCEDURE — 25010000002 HEPARIN (PORCINE) PER 1000 UNITS: Performed by: INTERNAL MEDICINE

## 2020-09-05 PROCEDURE — 80053 COMPREHEN METABOLIC PANEL: CPT | Performed by: NURSE PRACTITIONER

## 2020-09-05 PROCEDURE — 99222 1ST HOSP IP/OBS MODERATE 55: CPT | Performed by: UROLOGY

## 2020-09-05 PROCEDURE — 74160 CT ABDOMEN W/CONTRAST: CPT

## 2020-09-05 PROCEDURE — 82962 GLUCOSE BLOOD TEST: CPT

## 2020-09-05 PROCEDURE — 0 IOVERSOL 68 % SOLUTION: Performed by: INTERNAL MEDICINE

## 2020-09-05 PROCEDURE — 25010000002 CEFTRIAXONE PER 250 MG: Performed by: INTERNAL MEDICINE

## 2020-09-05 PROCEDURE — 99233 SBSQ HOSP IP/OBS HIGH 50: CPT | Performed by: INTERNAL MEDICINE

## 2020-09-05 PROCEDURE — 74160 CT ABDOMEN W/CONTRAST: CPT | Performed by: RADIOLOGY

## 2020-09-05 PROCEDURE — 86140 C-REACTIVE PROTEIN: CPT | Performed by: NURSE PRACTITIONER

## 2020-09-05 PROCEDURE — 85025 COMPLETE CBC W/AUTO DIFF WBC: CPT | Performed by: NURSE PRACTITIONER

## 2020-09-05 RX ADMIN — HEPARIN SODIUM 5000 UNITS: 5000 INJECTION INTRAVENOUS; SUBCUTANEOUS at 21:56

## 2020-09-05 RX ADMIN — DEXTROSE MONOHYDRATE 25 G: 25 INJECTION, SOLUTION INTRAVENOUS at 11:54

## 2020-09-05 RX ADMIN — HEPARIN SODIUM 5000 UNITS: 5000 INJECTION INTRAVENOUS; SUBCUTANEOUS at 06:31

## 2020-09-05 RX ADMIN — SODIUM CHLORIDE 125 ML/HR: 9 INJECTION, SOLUTION INTRAVENOUS at 06:30

## 2020-09-05 RX ADMIN — SODIUM CHLORIDE 125 ML/HR: 9 INJECTION, SOLUTION INTRAVENOUS at 17:58

## 2020-09-05 RX ADMIN — IOVERSOL 60 ML: 678 INJECTION INTRA-ARTERIAL; INTRAVENOUS at 13:15

## 2020-09-05 RX ADMIN — CEFTRIAXONE 1 G: 1 INJECTION, POWDER, FOR SOLUTION INTRAMUSCULAR; INTRAVENOUS at 17:59

## 2020-09-05 RX ADMIN — DOXYCYCLINE 100 MG: 100 INJECTION, POWDER, LYOPHILIZED, FOR SOLUTION INTRAVENOUS at 20:19

## 2020-09-05 RX ADMIN — PANTOPRAZOLE SODIUM 40 MG: 40 INJECTION, POWDER, FOR SOLUTION INTRAVENOUS at 06:31

## 2020-09-05 RX ADMIN — HEPARIN SODIUM 5000 UNITS: 5000 INJECTION INTRAVENOUS; SUBCUTANEOUS at 14:12

## 2020-09-05 RX ADMIN — SODIUM CHLORIDE, PRESERVATIVE FREE 10 ML: 5 INJECTION INTRAVENOUS at 20:19

## 2020-09-05 RX ADMIN — SODIUM CHLORIDE, PRESERVATIVE FREE 10 ML: 5 INJECTION INTRAVENOUS at 10:28

## 2020-09-05 NOTE — PROGRESS NOTES
[facility] HOSPITALIST PROGRESS NOTE     Patient Identification:  Name:  Ashlee Go  Age:  89 y.o.  Sex:  female  :  1925  MRN:  [id number]  Visit Number:  [id number]  ROOM: [address]     Primary Care Provider:  Yuridia Levy APRN    Length of stay in inpatient status:  1    Subjective     Chief Compliant:    Chief Complaint   Patient presents with   • Abdominal Pain   • Nausea   • Back Pain     History of Presenting Illness:    Patient stable, no acute events overnight, no new complaints, denies any fevers or chills, lipase and LFTs improving, MRCP complete and noted no stones but did note likely RCC and discussed w/ patient and if she would want further workup or treatment and she agreed to discuss w/ urology and have placed consult, otherwise have ordered CTA abdomen to assess for associated thrombosis w/ RCC possibly leading to pancreatitis and transaminitis etiology, otherwise labs as noted below are still pending, she reports she does not think she can advance diet as of yet but will let me know when she is ready.   Objective     Current Hospital Meds:  heparin (porcine) 5,000 Units Subcutaneous Q8H   ondansetron 4 mg Intravenous Once   pantoprazole 40 mg Intravenous Q AM   sodium chloride 10 mL Intravenous Q12H     sodium chloride 125 mL/hr Last Rate: 125 mL/hr (20 0630)     Current Antimicrobial Therapy:  Anti-Infectives (From admission, onward)    None        Current Diuretic Therapy:  Diuretics (From admission, onward)    None        ----------------------------------------------------------------------------------------------------------------------  Vital Signs:  Temp:  [98.1 °F (36.7 °C)-99.7 °F (37.6 °C)] 98.7 °F (37.1 °C)  Heart Rate:  [] 74  Resp:  [14-22] 19  BP: (132-180)/(62-97) 155/83  SpO2:  [92 %-98 %] 96 %  on   ;   Device (Oxygen Therapy): room air  Body mass index is 20.71 kg/m².    Wt Readings from Last 3 Encounters:   20 53 kg (116 lb 14.4 oz)    10/15/17 54 kg (119 lb)     Intake & Output (last 3 days)       09/02 0701 - 09/03 0700 09/03 0701 - 09/04 0700 09/04 0701 - 09/05 0700 09/05 0701 - 09/06 0700    P.O.   595     I.V. (mL/kg)   1838 (34.7)     IV Piggyback   1000     Total Intake(mL/kg)   3433 (64.8)     Urine (mL/kg/hr)   400 (0.3)     Total Output   400     Net   +3033             Urine Unmeasured Occurrence   1 x         Diet Clear Liquid  ----------------------------------------------------------------------------------------------------------------------  Physical exam:  Constitutional:  Elderly, no acute distress.      HENT:  Head:  Normocephalic and atraumatic.  Mouth:  Moist mucous membranes.    Eyes:  Conjunctivae and EOM are normal. No scleral icterus.    Neck:  Neck supple.  No JVD present.    Cardiovascular:  Normal rate, regular rhythm and normal heart sounds with no murmur.  Pulmonary/Chest:  No respiratory distress, no wheezes, no crackles  Abdominal:  Soft. No distension and mild tenderness.   Musculoskeletal:  No tenderness, and no deformity.     Neurological:  Alert and oriented to person, place, and time.  No cranial nerve deficit.   Skin:  Skin is warm and dry. No rash noted. No pallor.   Peripheral vascular: no clubbing, no cyanosis, no edema.  ----------------------------------------------------------------------------------------------------------------------  Tele:    ----------------------------------------------------------------------------------------------------------------------  Results from last 7 days   Lab Units 09/05/20 0108 09/04/20  0658   CRP mg/dL 5.27* 0.14   LACTATE mmol/L  --  1.0   WBC 10*3/mm3 5.76 12.10*   HEMOGLOBIN g/dL 10.8* 13.1   HEMATOCRIT % 34.3 41.4   MCV fL 93.5 92.4   MCHC g/dL 31.5 31.6   PLATELETS 10*3/mm3 156 205   INR   --  0.96         Results from last 7 days   Lab Units 09/05/20 0108 09/04/20  0658   SODIUM mmol/L 138 141   POTASSIUM mmol/L 4.2 4.5   MAGNESIUM mg/dL  --  2.3   CHLORIDE  mmol/L 108* 102   CO2 mmol/L 21.1* 27.7   BUN mg/dL 18 24*   CREATININE mg/dL 0.83 1.07*   EGFR IF NONAFRICN AM mL/min/1.73 64 48*   CALCIUM mg/dL 8.2 9.8*   PHOSPHORUS mg/dL  --  3.2   GLUCOSE mg/dL 100* 127*   ALBUMIN g/dL 2.89* 3.87   BILIRUBIN mg/dL 0.5 0.5   ALK PHOS U/L 153* 229*   AST (SGOT) U/L 347* 749*   ALT (SGPT) U/L 335* 235*   Estimated Creatinine Clearance: 33.9 mL/min (by C-G formula based on SCr of 0.83 mg/dL).  No results found for: AMMONIA  Results from last 7 days   Lab Units 09/04/20  0658   CK TOTAL U/L 43   TROPONIN T ng/mL <0.010     Results from last 7 days   Lab Units 09/04/20  0658   PROBNP pg/mL 736.2     Results from last 7 days   Lab Units 09/04/20  0658   TRIGLYCERIDES mg/dL 76     Hemoglobin A1C   Date/Time Value Ref Range Status   09/04/2020 0658 5.50 4.80 - 5.60 % Final     Glucose   Date/Time Value Ref Range Status   09/05/2020 0630 82 70 - 130 mg/dL Final   09/05/2020 0049 94 70 - 130 mg/dL Final   09/04/2020 1822 97 70 - 130 mg/dL Final     Lab Results   Component Value Date    TSH 9.250 (H) 09/04/2020     No results found for: PREGTESTUR, PREGSERUM, HCG, HCGQUANT  Pain Management Panel     Pain Management Panel Latest Ref Rng & Units 9/4/2020    AMPHETAMINES SCREEN, URINE Negative Negative    BARBITURATES SCREEN Negative Negative    BENZODIAZEPINE SCREEN, URINE Negative Negative    BUPRENORPHINEUR Negative Negative    COCAINE SCREEN, URINE Negative Negative    METHADONE SCREEN, URINE Negative Negative        Brief Urine Lab Results  (Last result in the past 365 days)      Color   Clarity   Blood   Leuk Est   Nitrite   Protein   CREAT   Urine HCG        09/04/20 0808 Yellow Clear Negative Trace Negative 30 mg/dL (1+)             Blood Culture   Date Value Ref Range Status   09/04/2020 No growth at 24 hours  Preliminary   09/04/2020 No growth at 24 hours  Preliminary     No results found for: URINECX  No results found for: WOUNDCX  No results found for: STOOLCX  No results found  for: RESPCX  No results found for: AFBCX  Results from last 7 days   Lab Units 09/05/20  0108 09/04/20  0658   PROCALCITONIN ng/mL  --  0.05   LACTATE mmol/L  --  1.0   SED RATE mm/hr  --  17   CRP mg/dL 5.27* 0.14     I have personally looked at the labs and they are summarized above.  ----------------------------------------------------------------------------------------------------------------------  Detailed radiology reports for the last 24 hours:    Imaging Results (Last 24 Hours)     Procedure Component Value Units Date/Time    MRI abdomen wo contrast mrcp [[id number]] Collected:  09/04/20 1615     Updated:  09/04/20 1623    Narrative:       EXAMINATION: MRI ABDOMEN WO CONTRAST MRCP-      CLINICAL INDICATION:     acute pancreatitis, elevated LFTs ?  obstruction, ?mass; K85.90-Acute pancreatitis without necrosis or  infection, unspecified     COMPARISON: CT 09/04/2020     TECHNIQUE:  Multiplanar, multisequence MR imaging of the abdomen  performed without contrast. MRCP sequences were obtained.      FINDINGS:   MRCP-hepatobiliary:  Gallbladder incompletely distended. No pathologic wall thickening or  luminal defects.  Common hepatic duct is 6 mm in diameter which is just above limits of  normal. Common bile duct is 9 mm in diameter which is at the upper  limits of normal. The intrahepatic biliary system appears nondilated. No  luminal filling defects identified.     MRCP-pancreas: Main pancreatic duct is within normal limits and appears  fairly smoothly defined throughout its visualized course with no  dilatation identified. Maximal diameter is approximately 1.5 mm. No  luminal filling defect or areas of signal loss.     Liver: Relatively homogeneous. No focal lesion. Small perihepatic  ascites.     Pancreas: Edematous. Interspersing fluid. Surrounding fluid. No  loculated collection. Consistent with pancreatitis. No focal mass  identified.     Kidneys: Complex mass upper pole left kidney with either  hemorrhagic  components or calcification based on MR features and is 3.4 cm in  diameter. Suspicious for primary renal cell carcinoma given lack of fat  suppression. No hydronephrosis identified.     Adrenals: No discrete masses.     Spleen: No focal lesion. No enlargement.     Visualized GI tract: Limited. No obstructive changes.     Peritoneal cavity: Limited. Interspersing fluid in the upper quadrants  likely secondary to pancreatitis.     Lymph nodes: No bulky adenopathy identified.     Lung bases: Cardiomegaly. Trace effusions.     Visualized bony structures: Degenerative changes thoracolumbar spine.          Impression:          1. Acute pancreatitis. No pseudocyst or discrete mass identified.  2. Incompletely distended gallbladder. No stones identified.  3. Common hepatic duct which is just above limits of normal and common  bile duct which is at the upper limits of normal. No luminal filling  defect or stricture identified.  4. Main pancreatic duct within normal limits for diameter. No dilatation  or irregularity.  5. Small amounts of ascites.  6. 3.4 cm complex mass upper pole left kidney suspicious for primary  renal cell carcinoma.  7. Cardiomegaly and trace effusions. Other findings above.     This report was finalized on 9/4/2020 4:21 PM by Dr. Triston Reveles MD.       US Abdomen Limited [[id number]] Collected:  09/04/20 1038     Updated:  09/04/20 1042    Narrative:       EXAMINATION: US ABDOMEN LIMITED-      CLINICAL INDICATION:Abdominal pain, elevated liver enzymes     COMPARISON: None      TECHNIQUE: Sonographic imaging obtained in transverse and sagittal  planes of the liver. Color Doppler implemented.     FINDINGS:   Heterogeneous liver echotexture which can be seen with underlying  hepatocellular disease.  There is abnormal wall thickening of the gallbladder with  pericholecystic fluid but without shadowing stones. Favor secondary wall  thickening from liver disease. Acalculus cholecystitis felt  less likely.  No intrahepatic biliary dilatation noted.  Liver size appears within normal limits.  No perihepatic ascites identified.  Common bile duct is within normal limits and is:3 mm.          Impression:          1. Heterogeneous liver echotexture indicative of underlying  hepatocellular disease. Correlate with laboratory data.  2. Concentric gallbladder wall thickening without shadowing stones. This  is commonly seen in the setting of underlying liver disease but  acalculus cholecystitis should be excluded clinically.     This report was finalized on 9/4/2020 10:40 AM by Dr. Triston Reveles MD.           Assessment & Plan    Ms Go is 89F PMH HTN but otherwise relatively healthy, presented w/ abd pain and vomiting x 1 days.    #SIRs + 2/2 Acute Pancreatitis  #Acute Mod Transaminitis  - Patient presented w/ 1 day hx acute abd pain, noted had 1x in past, on arrival noted to have Lipase > 3K and AST/ALT in 700's but no elevated Tbili, Etoh and APAP negative, denies history, TG's NML  - US showed heterogeneous liver indicative of underlying liver disease, concentric GB wall thickening w/o stones which can be seen w/ underlying liver disease but also acalculous cholecystitis  - CT Abd/Pelvis showed inflammation centered around pancreas, no pseudocyst or hematoma, minimal fluid around GB w/ mild distension  - MRCP showed acute pancreatitis, no pseudocyst or mass identified, incompletely distended GB, no stones, Common hepatic duct just above limits of NML, CBD at ULN, no luminal filling defects or strictures, main pancreatic duct NML, small ascites  - Have ordered AMA, ASMA, IGG4, EBV, CMV studies   - Have also ordered CTA abdomen to rule out vascular thrombus in setting of now presumed RCC.  - Lipase and LFTs much improved today, so far suspect may have passed gallstone w/ transient obstruction, f/u studies above, trend LFTs and symptoms, advance diet as tolerated, continue to monitor in PCU.    #L Renal Mass  "c/f RCC  - Noted on CT 9/4, MRCP c/f RCC, patient reports she has been told in past and was supposed to relay to her PCP but reports she \"never did tell them\"  - Have requested records from [facility]  - Have consulted Urology; Appreciate recs    #Mild Ileus  - Noted on CT; Following for symptoms and bowel movement    #B/l Pulm Nodules  - Noted on CT chest, largest 6.5mm, reports she has been told she had nodules in past; F/u outpatient per PCP.    #HTN  - Holding home agents; monitor BP for need to resume    #CKD?  - Cr 1.07 on admission, unclear baseline, today down to 0.83; Trending Cr and UOP, avoid nephrotoxins, NSAIDs, dehydration and contrast as able.    #Advanced Age  - Supportive Care    F: Oral, mIVFs  E: Monitor & Replace PRN  N: CLD, advance as tolerated  Ppx: SQH  Code: DNR/DNI    Dispo: Pending workup and clinical improvement    *This patient is considered high risk due to acute pancreatitis, elevated LFTs, new likely malignancy    VTE Prophylaxis:   Mechanical Order History:     None      Pharmalogical Order History:     Ordered     Dose Route Frequency Stop    09/04/20 1317  heparin (porcine) 5000 UNIT/ML injection 5,000 Units      5,000 Units SC Every 8 Hours Scheduled --        Jeffery Brasher MD  [facility]  09/05/20  10:41  "

## 2020-09-05 NOTE — PLAN OF CARE
Problem: Patient Care Overview  Goal: Plan of Care Review  Outcome: Ongoing (interventions implemented as appropriate)  Flowsheets (Taken 9/5/2020 1613)  Progress: no change  Plan of Care Reviewed With: patient  Outcome Summary: Pt continues to tolerate clear liquid diet. Up to bedside commode. Vital signs stable. Will continue to monitor.

## 2020-09-05 NOTE — CONSULTS
Referring Provider:  Yuridia Levy APRN    Chief complaint nausea and vomiting    Subjective     Patient is a 89 y.o. female presents with nausea and vomiting and abdominal pain.  Patient had a CT scan that showed a 3.5 cm left renal mass that was confirmed to be a solid mass that enhanced with the MRI performed with and without gadolinium her creatinine is 1.07.  She denies left-sided flank pain or gross hematuria  Review of Systems   Pertinent items are noted in HPI, all other systems reviewed and negative    History  Past Medical History:   Diagnosis Date   • Hip pain, acute, left    • Hypertension      History reviewed. No pertinent surgical history.  History reviewed. No pertinent family history.  Social History     Tobacco Use   • Smoking status: Never Smoker   • Smokeless tobacco: Never Used   Substance Use Topics   • Alcohol use: No   • Drug use: No     Medications Prior to Admission   Medication Sig Dispense Refill Last Dose   • lisinopril-hydrochlorothiazide (PRINZIDE,ZESTORETIC) 20-12.5 MG per tablet Take 1 tablet by mouth Daily.   9/3/2020 at Unknown time     Allergies:  Patient has no known allergies.    Objective     Vital Signs  Temp:  [98.1 °F (36.7 °C)-99.7 °F (37.6 °C)] 98.7 °F (37.1 °C)  Heart Rate:  [] 74  Resp:  [14-22] 19  BP: (132-180)/(62-97) 155/83    Physical Exam:      General Appearance:    Alert, cooperative, in no acute distress   Head:    Normocephalic, without obvious abnormality, atraumatic   Eyes:            Lids and lashes normal, conjunctivae and sclerae normal, no   icterus, no pallor, corneas clear, PERRLA   Ears:    Ears appear intact with no abnormalities noted   Throat:   No oral lesions, no thrush, oral mucosa moist   Neck:   No adenopathy, supple, trachea midline, no thyromegaly, no     carotid bruit, no JVD   Back:     No kyphosis present, no scoliosis present, no skin lesions,       erythema or scars, no tenderness to percussion or                   palpation,    range of motion normal   Lungs:     Clear to auscultation,respirations regular, even and                   unlabored    Heart:    Regular rhythm and normal rate, normal S1 and S2, no            murmur, no gallop, no rub, no click   Breast Exam:    Deferred   Abdomen:     Normal bowel sounds, no masses, no organomegaly, soft        non-tender, non-distended, no guarding, no rebound                 tenderness   Genitalia:    Deferred   Extremities:   Moves all extremities well, no edema, no cyanosis, no              redness   Pulses:   Pulses palpable and equal bilaterally   Skin:   No bleeding, bruising or rash   Lymph nodes:   No palpable adenopathy   Neurologic:   Cranial nerves 2 - 12 grossly intact, sensation intact, DTR        present and equal bilaterally       Results Review:    I reviewed the patient's new clinical results.    Assessment/Plan       Pancreatitis    The patient is a 89-year-old female who had an incidental left renal mass noted on a CT scan.  This mass is solid and is likely to be a renal cell cancer.  There is a small chance that it is a benign renal tumor.  The patient is quite healthy in her age and she has had no surgery.  If she were 40 there will be no question the best choice would be for her that would be either a partial or total nephrectomy pending on which is vertically possible.  Given her age I think would be quite clinically reasonable to gather more information.  The information that we need is to have some idea of how fast this mass is growing.  The 2 options are watching the mass or proceeding with surgery.  The patient and I are in agreement that we should watch this mass and I would recommend a renal ultrasound in 3 months.    Thank you very much for this consultation    I discussed the patients findings and my recommendations with patient.     Arturo Ascencio MD  09/05/20  10:39

## 2020-09-05 NOTE — PLAN OF CARE
Pt states she would like this doctor to write her an appetite stimulant when she gets to go home.

## 2020-09-05 NOTE — PLAN OF CARE
Problem: Patient Care Overview  Goal: Plan of Care Review  Outcome: Ongoing (interventions implemented as appropriate)  Flowsheets  Taken 9/4/2020 1843 by Adrianna Jaquez RN  Progress: improving  Taken 9/4/2020 1945 by Ovi Salgado, RN  Plan of Care Reviewed With: patient  Goal: Individualization and Mutuality  Outcome: Ongoing (interventions implemented as appropriate)  Goal: Discharge Needs Assessment  Outcome: Ongoing (interventions implemented as appropriate)  Flowsheets  Taken 9/4/2020 1328 by Nabeel Boyd, RN  Equipment Currently Used at Home: cane, quad;commode  Taken 9/4/2020 1336 by Nabeel Boyd, RN  Transportation Anticipated: family or friend will provide  Patient/Family Anticipated Services at Transition: none  Patient/Family Anticipates Transition to: home  Goal: Interprofessional Rounds/Family Conf  Outcome: Ongoing (interventions implemented as appropriate)     Problem: Fall Risk (Adult)  Goal: Identify Related Risk Factors and Signs and Symptoms  Outcome: Ongoing (interventions implemented as appropriate)  Flowsheets (Taken 9/5/2020 0253)  Related Risk Factors (Fall Risk): history of falls  Signs and Symptoms (Fall Risk): presence of risk factors  Goal: Absence of Fall  Outcome: Ongoing (interventions implemented as appropriate)  Flowsheets (Taken 9/5/2020 0253)  Absence of Fall: making progress toward outcome     Problem: Pancreatitis, Acute/Chronic (Adult)  Goal: Signs and Symptoms of Listed Potential Problems Will be Absent, Minimized or Managed (Pancreatitis, Acute/Chronic)  Outcome: Ongoing (interventions implemented as appropriate)  Flowsheets (Taken 9/5/2020 0253)  Problems Assessed (Pancreatitis): all     Problem: Pain, Acute (Adult)  Goal: Identify Related Risk Factors and Signs and Symptoms  Outcome: Ongoing (interventions implemented as appropriate)  Goal: Acceptable Pain Control/Comfort Level  Outcome: Ongoing (interventions implemented as appropriate)  Flowsheets  (Taken 9/5/2020 0253)  Acceptable Pain Control/Comfort Level: making progress toward outcome

## 2020-09-06 LAB
ALBUMIN SERPL-MCNC: 2.99 G/DL (ref 3.5–5.2)
ALBUMIN/GLOB SERPL: 1.2 G/DL
ALP SERPL-CCNC: 134 U/L (ref 39–117)
ALT SERPL W P-5'-P-CCNC: 181 U/L (ref 1–33)
ANION GAP SERPL CALCULATED.3IONS-SCNC: 8.5 MMOL/L (ref 5–15)
AST SERPL-CCNC: 97 U/L (ref 1–32)
BILIRUB SERPL-MCNC: 0.3 MG/DL (ref 0–1.2)
BUN SERPL-MCNC: 10 MG/DL (ref 8–23)
BUN/CREAT SERPL: 13.5 (ref 7–25)
CALCIUM SPEC-SCNC: 8.4 MG/DL (ref 8.2–9.6)
CHLORIDE SERPL-SCNC: 106 MMOL/L (ref 98–107)
CO2 SERPL-SCNC: 22.5 MMOL/L (ref 22–29)
CREAT SERPL-MCNC: 0.74 MG/DL (ref 0.57–1)
GFR SERPL CREATININE-BSD FRML MDRD: 73 ML/MIN/1.73
GLOBULIN UR ELPH-MCNC: 2.5 GM/DL
GLUCOSE BLDC GLUCOMTR-MCNC: 129 MG/DL (ref 70–130)
GLUCOSE BLDC GLUCOMTR-MCNC: 70 MG/DL (ref 70–130)
GLUCOSE BLDC GLUCOMTR-MCNC: 79 MG/DL (ref 70–130)
GLUCOSE BLDC GLUCOMTR-MCNC: 89 MG/DL (ref 70–130)
GLUCOSE SERPL-MCNC: 85 MG/DL (ref 65–99)
POTASSIUM SERPL-SCNC: 3.5 MMOL/L (ref 3.5–5.2)
PROT SERPL-MCNC: 5.5 G/DL (ref 6–8.5)
SARS-COV-2 RNA RESP QL NAA+PROBE: NOT DETECTED
SODIUM SERPL-SCNC: 137 MMOL/L (ref 136–145)

## 2020-09-06 PROCEDURE — 87635 SARS-COV-2 COVID-19 AMP PRB: CPT | Performed by: INTERNAL MEDICINE

## 2020-09-06 PROCEDURE — 99233 SBSQ HOSP IP/OBS HIGH 50: CPT | Performed by: INTERNAL MEDICINE

## 2020-09-06 PROCEDURE — 25010000002 HYDRALAZINE PER 20 MG: Performed by: INTERNAL MEDICINE

## 2020-09-06 PROCEDURE — 99231 SBSQ HOSP IP/OBS SF/LOW 25: CPT | Performed by: UROLOGY

## 2020-09-06 PROCEDURE — 80053 COMPREHEN METABOLIC PANEL: CPT | Performed by: INTERNAL MEDICINE

## 2020-09-06 PROCEDURE — 25010000002 CEFTRIAXONE PER 250 MG: Performed by: INTERNAL MEDICINE

## 2020-09-06 PROCEDURE — 25010000002 HEPARIN (PORCINE) PER 1000 UNITS: Performed by: INTERNAL MEDICINE

## 2020-09-06 PROCEDURE — 82962 GLUCOSE BLOOD TEST: CPT

## 2020-09-06 RX ORDER — HYDRALAZINE HYDROCHLORIDE 20 MG/ML
10 INJECTION INTRAMUSCULAR; INTRAVENOUS EVERY 8 HOURS PRN
Status: DISCONTINUED | OUTPATIENT
Start: 2020-09-06 | End: 2020-09-07 | Stop reason: HOSPADM

## 2020-09-06 RX ORDER — LISINOPRIL 10 MG/1
20 TABLET ORAL
Status: DISCONTINUED | OUTPATIENT
Start: 2020-09-07 | End: 2020-09-07 | Stop reason: HOSPADM

## 2020-09-06 RX ORDER — LISINOPRIL 10 MG/1
10 TABLET ORAL
Status: DISCONTINUED | OUTPATIENT
Start: 2020-09-06 | End: 2020-09-06

## 2020-09-06 RX ORDER — POLYETHYLENE GLYCOL 3350 17 G/17G
17 POWDER, FOR SOLUTION ORAL DAILY
Status: DISCONTINUED | OUTPATIENT
Start: 2020-09-06 | End: 2020-09-07 | Stop reason: HOSPADM

## 2020-09-06 RX ADMIN — SODIUM CHLORIDE 75 ML/HR: 9 INJECTION, SOLUTION INTRAVENOUS at 16:52

## 2020-09-06 RX ADMIN — LISINOPRIL 10 MG: 10 TABLET ORAL at 11:21

## 2020-09-06 RX ADMIN — DOXYCYCLINE 100 MG: 100 INJECTION, POWDER, LYOPHILIZED, FOR SOLUTION INTRAVENOUS at 21:40

## 2020-09-06 RX ADMIN — HEPARIN SODIUM 5000 UNITS: 5000 INJECTION INTRAVENOUS; SUBCUTANEOUS at 14:10

## 2020-09-06 RX ADMIN — HEPARIN SODIUM 5000 UNITS: 5000 INJECTION INTRAVENOUS; SUBCUTANEOUS at 21:40

## 2020-09-06 RX ADMIN — SODIUM CHLORIDE 125 ML/HR: 9 INJECTION, SOLUTION INTRAVENOUS at 04:09

## 2020-09-06 RX ADMIN — SODIUM CHLORIDE, PRESERVATIVE FREE 10 ML: 5 INJECTION INTRAVENOUS at 21:40

## 2020-09-06 RX ADMIN — HEPARIN SODIUM 5000 UNITS: 5000 INJECTION INTRAVENOUS; SUBCUTANEOUS at 06:20

## 2020-09-06 RX ADMIN — CEFTRIAXONE 1 G: 1 INJECTION, POWDER, FOR SOLUTION INTRAMUSCULAR; INTRAVENOUS at 17:04

## 2020-09-06 RX ADMIN — HYDRALAZINE HYDROCHLORIDE 10 MG: 20 INJECTION INTRAMUSCULAR; INTRAVENOUS at 19:41

## 2020-09-06 RX ADMIN — DOXYCYCLINE 100 MG: 100 INJECTION, POWDER, LYOPHILIZED, FOR SOLUTION INTRAVENOUS at 06:21

## 2020-09-06 RX ADMIN — PANTOPRAZOLE SODIUM 40 MG: 40 INJECTION, POWDER, FOR SOLUTION INTRAVENOUS at 06:20

## 2020-09-06 RX ADMIN — SODIUM CHLORIDE, PRESERVATIVE FREE 10 ML: 5 INJECTION INTRAVENOUS at 09:01

## 2020-09-06 RX ADMIN — POLYETHYLENE GLYCOL (3350) 17 G: 17 POWDER, FOR SOLUTION ORAL at 11:21

## 2020-09-06 NOTE — PLAN OF CARE
Problem: Patient Care Overview  Goal: Plan of Care Review  Outcome: Ongoing (interventions implemented as appropriate)  Flowsheets  Taken 9/5/2020 1613 by Adrianna Jaquez RN  Progress: no change  Taken 9/5/2020 1918 by Rhina Hernandez RN  Plan of Care Reviewed With: patient  Goal: Individualization and Mutuality  Outcome: Ongoing (interventions implemented as appropriate)  Goal: Discharge Needs Assessment  Outcome: Ongoing (interventions implemented as appropriate)  Flowsheets  Taken 9/4/2020 1328 by Nabeel Boyd, RN  Equipment Currently Used at Home: cane, quad;commode  Taken 9/4/2020 1336 by Nabeel Boyd, RN  Transportation Anticipated: family or friend will provide  Patient/Family Anticipated Services at Transition: none  Patient/Family Anticipates Transition to: home  Goal: Interprofessional Rounds/Family Conf  Outcome: Ongoing (interventions implemented as appropriate)     Problem: Fall Risk (Adult)  Goal: Identify Related Risk Factors and Signs and Symptoms  Outcome: Ongoing (interventions implemented as appropriate)  Flowsheets (Taken 9/5/2020 0253)  Related Risk Factors (Fall Risk): history of falls  Signs and Symptoms (Fall Risk): presence of risk factors  Goal: Absence of Fall  Outcome: Ongoing (interventions implemented as appropriate)  Flowsheets (Taken 9/6/2020 0230)  Absence of Fall: making progress toward outcome  Intervention: Monitor/Assist with Self Care  Flowsheets (Taken 9/6/2020 0000 by Rupal Nobles, RN)  Activity Assistance Provided: assistance, 1 person     Problem: Pancreatitis, Acute/Chronic (Adult)  Goal: Signs and Symptoms of Listed Potential Problems Will be Absent, Minimized or Managed (Pancreatitis, Acute/Chronic)  Outcome: Ongoing (interventions implemented as appropriate)  Flowsheets (Taken 9/5/2020 0253)  Problems Assessed (Pancreatitis): all     Problem: Pain, Acute (Adult)  Goal: Identify Related Risk Factors and Signs and Symptoms  Outcome: Ongoing (interventions  implemented as appropriate)  Goal: Acceptable Pain Control/Comfort Level  Outcome: Ongoing (interventions implemented as appropriate)  Flowsheets (Taken 9/6/2020 0230)  Acceptable Pain Control/Comfort Level: making progress toward outcome

## 2020-09-06 NOTE — PROGRESS NOTES
We will see her in 3 months with a renal ultrasound prior and will follow the size and rate of growth of the mass in the left kidney.  Patient is in agreement with this plan

## 2020-09-06 NOTE — PROGRESS NOTES
[facility] HOSPITALIST PROGRESS NOTE     Patient Identification:  Name:  Ashlee Go  Age:  89 y.o.  Sex:  female  :  1925  MRN:  [id number]  Visit Number:  [id number]  ROOM: [address]     Primary Care Provider:  Yuridia Levy APRN    Length of stay in inpatient status:  2    Subjective     Chief Compliant:    Chief Complaint   Patient presents with   • Abdominal Pain   • Nausea   • Back Pain     History of Presenting Illness:    Patient stable today, no acute events overnight, no new complaints, CT yesterday noted new PNA and started on Abx, Urology saw and per discussions w/ patient given her age have elected to monitor renal mass w/ serial US but is c/f RCC, BP trending up and have resumed home lisinopril, advancing diet to soft texture, AMA was negative, ASMA weakly positive but LFTs have significantly improved now, still has a few other labs pending, she denies any fevers or chills.   Objective     Current Hospital Meds:  cefTRIAXone 1 g Intravenous Q24H   doxycycline 100 mg Intravenous Q12H   heparin (porcine) 5,000 Units Subcutaneous Q8H   lisinopril 10 mg Oral Q24H   ondansetron 4 mg Intravenous Once   pantoprazole 40 mg Intravenous Q AM   polyethylene glycol 17 g Oral Daily   sodium chloride 10 mL Intravenous Q12H     sodium chloride 75 mL/hr Last Rate: 125 mL/hr (20 0409)     Current Antimicrobial Therapy:  Anti-Infectives (From admission, onward)    Ordered     Dose/Rate Route Frequency Start Stop    20 1630  doxycycline (VIBRAMYCIN) 100 mg/100 mL 0.9% NS MBP  Review   Ordering Provider:  Jeffery Brasher MD    100 mg  over 60 Minutes Intravenous Every 12 Hours 20 1900 20 1859    20 1630  cefTRIAXone (ROCEPHIN) 1 g/100 mL 0.9% NS (MBP)  Review   Ordering Provider:  Jeffery Brasher MD    1 g  over 30 Minutes Intravenous Every 24 Hours 20 1800 20 1759        Current Diuretic Therapy:  Diuretics (From admission, onward)    None         ----------------------------------------------------------------------------------------------------------------------  Vital Signs:  Temp:  [97.8 °F (36.6 °C)-98.8 °F (37.1 °C)] 97.8 °F (36.6 °C)  Heart Rate:  [71-95] 85  Resp:  [12-20] 20  BP: (119-187)/(55-98) 187/98  SpO2:  [94 %-98 %] 96 %  on   ;   Device (Oxygen Therapy): room air  Body mass index is 21.38 kg/m².    Wt Readings from Last 3 Encounters:   09/06/20 54.7 kg (120 lb 11.2 oz)   10/15/17 54 kg (119 lb)     Intake & Output (last 3 days)       09/03 0701 - 09/04 0700 09/04 0701 - 09/05 0700 09/05 0701 - 09/06 0700 09/06 0701 - 09/07 0700    P.O.  595 410     I.V. (mL/kg)  1838 (34.7) 2549.8 (46.6)     IV Piggyback  1000 50     Total Intake(mL/kg)  3433 (64.8) 3009.8 (55)     Urine (mL/kg/hr)  400 (0.3) 1300 (1)     Total Output  400 1300     Net  +3033 +1709.8             Urine Unmeasured Occurrence  1 x 1 x         Diet Soft Texture; Ground; Thin  ----------------------------------------------------------------------------------------------------------------------  Physical exam:  Constitutional:  Elderly, no acute distress.      HENT:  Head:  Normocephalic and atraumatic.  Mouth:  Moist mucous membranes.    Eyes:  Conjunctivae and EOM are normal. No scleral icterus.    Neck:  Neck supple.  No JVD present.    Cardiovascular:  Normal rate, regular rhythm and normal heart sounds with no murmur.  Pulmonary/Chest:  No respiratory distress, no wheezes, no crackles  Abdominal:  Soft. No distension and minimal tenderness.   Musculoskeletal:  No tenderness, and no deformity.     Neurological:  Alert and oriented to person, place, and time.  No gross focal deficits  Skin:  Skin is warm and dry. No rash noted. No pallor.   Peripheral vascular: no clubbing, no cyanosis, no edema.  ----------------------------------------------------------------------------------------------------------------------  Tele:     ----------------------------------------------------------------------------------------------------------------------  Results from last 7 days   Lab Units 09/05/20 0108 09/04/20 0658   CRP mg/dL 5.27* 0.14   LACTATE mmol/L  --  1.0   WBC 10*3/mm3 5.76 12.10*   HEMOGLOBIN g/dL 10.8* 13.1   HEMATOCRIT % 34.3 41.4   MCV fL 93.5 92.4   MCHC g/dL 31.5 31.6   PLATELETS 10*3/mm3 156 205   INR   --  0.96         Results from last 7 days   Lab Units 09/06/20 0748 09/05/20 0108 09/04/20 0658   SODIUM mmol/L 137 138 141   POTASSIUM mmol/L 3.5 4.2 4.5   MAGNESIUM mg/dL  --   --  2.3   CHLORIDE mmol/L 106 108* 102   CO2 mmol/L 22.5 21.1* 27.7   BUN mg/dL 10 18 24*   CREATININE mg/dL 0.74 0.83 1.07*   EGFR IF NONAFRICN AM mL/min/1.73 73 64 48*   CALCIUM mg/dL 8.4 8.2 9.8*   PHOSPHORUS mg/dL  --   --  3.2   GLUCOSE mg/dL 85 100* 127*   ALBUMIN g/dL 2.99* 2.89* 3.87   BILIRUBIN mg/dL 0.3 0.5 0.5   ALK PHOS U/L 134* 153* 229*   AST (SGOT) U/L 97* 347* 749*   ALT (SGPT) U/L 181* 335* 235*   Estimated Creatinine Clearance: 36.3 mL/min (by C-G formula based on SCr of 0.74 mg/dL).  No results found for: AMMONIA  Results from last 7 days   Lab Units 09/04/20 0658   CK TOTAL U/L 43   TROPONIN T ng/mL <0.010     Results from last 7 days   Lab Units 09/04/20  0658   PROBNP pg/mL 736.2     Results from last 7 days   Lab Units 09/04/20  0658   TRIGLYCERIDES mg/dL 76     Hemoglobin A1C   Date/Time Value Ref Range Status   09/04/2020 0658 5.50 4.80 - 5.60 % Final     Glucose   Date/Time Value Ref Range Status   09/06/2020 0639 70 70 - 130 mg/dL Final   09/06/2020 0023 89 70 - 130 mg/dL Final   09/05/2020 1757 73 70 - 130 mg/dL Final   09/05/2020 1240 134 (H) 70 - 130 mg/dL Final   09/05/2020 1149 69 (L) 70 - 130 mg/dL Final   09/05/2020 0630 82 70 - 130 mg/dL Final   09/05/2020 0049 94 70 - 130 mg/dL Final   09/04/2020 1822 97 70 - 130 mg/dL Final     Lab Results   Component Value Date    TSH 9.250 (H) 09/04/2020    FREET4 1.04  09/05/2020     No results found for: PREGTESTUR, PREGSERUM, HCG, HCGQUANT  Pain Management Panel     Pain Management Panel Latest Ref Rng & Units 9/4/2020    AMPHETAMINES SCREEN, URINE Negative Negative    BARBITURATES SCREEN Negative Negative    BENZODIAZEPINE SCREEN, URINE Negative Negative    BUPRENORPHINEUR Negative Negative    COCAINE SCREEN, URINE Negative Negative    METHADONE SCREEN, URINE Negative Negative        Brief Urine Lab Results  (Last result in the past 365 days)      Color   Clarity   Blood   Leuk Est   Nitrite   Protein   CREAT   Urine HCG        09/04/20 0808 Yellow Clear Negative Trace Negative 30 mg/dL (1+)             Blood Culture   Date Value Ref Range Status   09/04/2020 No growth at 2 days  Preliminary   09/04/2020 No growth at 2 days  Preliminary     No results found for: URINECX  No results found for: WOUNDCX  No results found for: STOOLCX  No results found for: RESPCX  No results found for: AFBCX  Results from last 7 days   Lab Units 09/05/20  0108 09/04/20  0658   PROCALCITONIN ng/mL  --  0.05   LACTATE mmol/L  --  1.0   SED RATE mm/hr  --  17   CRP mg/dL 5.27* 0.14     I have personally looked at the labs and they are summarized above.  ----------------------------------------------------------------------------------------------------------------------  Detailed radiology reports for the last 24 hours:    Imaging Results (Last 24 Hours)     Procedure Component Value Units Date/Time    CT Abdomen With Contrast [[id number]] Collected:  09/05/20 1224     Updated:  09/05/20 1231    Narrative:       EXAM: CT ABDOMEN W CONTRAST-            TECHNIQUE: Multiple axial CT images were obtained from lung bases  through pubic symphysis WITH administration of IV contrast. Reformatted  images in the coronal and/or sagittal plane(s) were generated from the  axial data set to facilitate diagnostic accuracy and/or surgical  planning.  Oral Contrast:NONE.     Radiation dose reduction techniques were  utilized per ALARA protocol.  Automated exposure control was initiated through either or [employer] Calendars or  DoseRight software packages by  protocol.       DOSE:     Clinical information  assess for vascular thrombus in setting of likely RCC w/ pancreatitis  and transaminitis of unknown etiology; K85.90-Acute pancreatitis without  necrosis or infection, unspecified; N28.89-Other specified disorders of  kidney and ureter      Comparison  NONE.     FINDINGS:     Lower thorax: Left greater than right pleural effusions. Left lower lobe  partially consolidative airspace disease may represent atelectasis  and/or pneumonia. Cardiomegaly and interstitial thickening likely edema.     Abdomen:     Liver: Homogeneous appearance of liver with no focal liver lesion but  there is a mild degree of intrahepatic biliary dilatation. The common  hepatic duct is slightly dilated and is approximately 8.7 mm.  Common  bile duct appears within normal limits and is approximately 7.4 mm in  diameter.  Gallbladder: Secondary pericholecystic fluid. No gallbladder distention  or CT evidence of wall thickening.  Pancreas: Homogeneous and smooth appearance of the pancreas but with  peripancreatic fluid present compatible with known pancreatitis. No  pancreas pseudocyst, necrosis, or hemorrhage identified.  Spleen: No filling defect of the splenic vein identified.  Adrenals: No mass.  Kidneys/ureters: Solid  enhancing complex mass left kidney that is 3.5  cm consistent with primary renal cell carcinoma.  GI tract: Mild small bowel ileus but no evidence of bowel obstruction.  Peritoneum: Small amounts of ascites are noted but no loculated fluid  collections identified.  Mesentery: Unremarkable.  Lymph nodes: No bulky retroperitoneal lymphadenopathy identified.  Vasculature: No filling defect of the portal vein or superior mesenteric  vein. Contrast bolus mixing is noted of the IVC below level of the renal  veins but no definite filling  defect of the left or right renal veins  identified. Advanced atherosclerosis abdominal arterial vasculature  without evidence of aneurysm.  Abdominal wall: No focal hernia or mass.     Other: None.     Pelvis:     Bladder: No focal mass or significant wall thickening  Reproductive: Unremarkable as visualized.  Appendix: Nondistended. No surrounding inflammation.     Bones: No acute bony abnormality.       Impression:       1. Opacified portal vein, splenic vein, and superior mesenteric veins  are unremarkable with no filling defect identified.  2. Less well opacified renal veins are grossly unremarkable with no  obvious filling defect although these areas are limited somewhat due to  contrast bolus between and timing of the exam. Nonetheless no clear  filling defect of the suprahepatic IVC.  3. Mild intrahepatic biliary dilatation and/or mild degree of periportal  edema.  4. Common hepatic duct which is above limits of normal but the common  bile duct appears within normal limits.  5. Small ascites.  6. 3.5 cm enhancing left renal mass, stable.  7. Persistent mild pancreatitis changes without pseudocyst identified.  No hemorrhage.  8. Left lower lobe pneumonia. CHF. Pleural effusions.        This report was finalized on 9/5/2020 12:29 PM by Dr. Triston Reveles MD.           Assessment & Plan    Ms Go is 89F PMH HTN but otherwise relatively healthy, presented w/ abd pain and vomiting x 1 days.    #SIRs + 2/2 Acute Pancreatitis - Improved  #Acute Mod Transaminitis - Improved  #PNA, Bacterial, Treating for CAP - New  - Patient presented w/ 1 day hx acute abd pain, noted had 1x in past, on arrival noted to have Lipase > 3K and AST/ALT in 700's but no elevated Tbili, Etoh and APAP negative, denies history, TG's NML, AMA NML, ASMA weakly positive at 21  - US showed heterogeneous liver indicative of underlying liver disease, concentric GB wall thickening w/o stones which can be seen w/ underlying liver disease but also  "acalculous cholecystitis  - CT Abd/Pelvis showed inflammation centered around pancreas, no pseudocyst or hematoma, minimal fluid around GB w/ mild distension  - MRCP showed acute pancreatitis, no pseudocyst or mass identified, incompletely distended GB, no stones, Common hepatic duct just above limits of NML, CBD at ULN, no luminal filling defects or strictures, main pancreatic duct NML, small ascites  - CTA abdomen showed basilar PNA  - ASMA Ab only weakly positive, LFTs significantly improved, likely insignificant finding, still pending IGG4, EBV, CMV studies  - F/u studies above, trend LFTs and symptoms, advance diet as tolerated, continue to monitor in PCU.    #L Renal Mass c/f RCC  - Noted on CT 9/4, MRCP c/f RCC, patient reports she has been told in past and was supposed to relay to her PCP but reports she \"never did tell them\"  - Have consulted Urology; Evaluated, following discussion w/ patient has elected to monitor w/ US in 3 months, f/u outpatient Urology for continued surveillance    #Mild Ileus  - Noted on CT; Following for symptoms and bowel movement, have added miralax today    #B/l Pulm Nodules  - Noted on CT chest, largest 6.5mm, reports she has been told she had nodules in past; F/u outpatient per PCP.    #HTN  - Trending up; have resumed home Lisinopril at lower dose of 10mg qd    #CKD?  - Cr 1.07 on admission, unclear baseline, stable   - Trending Cr and UOP, avoid nephrotoxins, NSAIDs, dehydration and contrast as able.    #Advanced Age  - Supportive Care    F: Oral, mIVFs  E: Monitor & Replace PRN  N: Soft texture  Ppx: SQH  Code: DNR/DNI    Dispo: Pending clinical improvement    *This patient is considered high risk due to acute pancreatitis, elevated LFTs, new likely malignancy    VTE Prophylaxis:   Mechanical Order History:     None      Pharmalogical Order History:     Ordered     Dose Route Frequency Stop    09/04/20 1317  heparin (porcine) 5000 UNIT/ML injection 5,000 Units      5,000 " Units SC Every 8 Hours Scheduled --        Jeffery Brasher MD  [facility] ERist  09/06/20  09:24

## 2020-09-06 NOTE — PLAN OF CARE
Problem: Patient Care Overview  Goal: Plan of Care Review  Outcome: Ongoing (interventions implemented as appropriate)  Flowsheets (Taken 9/6/2020 1614)  Progress: improving  Plan of Care Reviewed With: patient  Outcome Summary: Patient's vital signs stable at this time. Tolerating soft texture diet eating small amounts of her meals, however pt reports she has very poor appetite & that this is something she was also previously experiencing at home. Up to bedside commode with assistance x1. Pt reports she has spoken to several of her family members on the phone today. Will continue to monitor.

## 2020-09-07 VITALS
HEIGHT: 63 IN | TEMPERATURE: 98.5 F | DIASTOLIC BLOOD PRESSURE: 91 MMHG | OXYGEN SATURATION: 98 % | RESPIRATION RATE: 20 BRPM | SYSTOLIC BLOOD PRESSURE: 167 MMHG | WEIGHT: 120.7 LBS | BODY MASS INDEX: 21.39 KG/M2 | HEART RATE: 105 BPM

## 2020-09-07 LAB
ALBUMIN SERPL-MCNC: 2.81 G/DL (ref 3.5–5.2)
ALBUMIN/GLOB SERPL: 1.1 G/DL
ALP SERPL-CCNC: 119 U/L (ref 39–117)
ALT SERPL W P-5'-P-CCNC: 129 U/L (ref 1–33)
ANION GAP SERPL CALCULATED.3IONS-SCNC: 9.3 MMOL/L (ref 5–15)
AST SERPL-CCNC: 53 U/L (ref 1–32)
BILIRUB SERPL-MCNC: 0.3 MG/DL (ref 0–1.2)
BUN SERPL-MCNC: 9 MG/DL (ref 8–23)
BUN/CREAT SERPL: 14.1 (ref 7–25)
CALCIUM SPEC-SCNC: 8.5 MG/DL (ref 8.2–9.6)
CHLORIDE SERPL-SCNC: 106 MMOL/L (ref 98–107)
CMV IGG SERPL IA-ACNC: 8.1 U/ML (ref 0–0.59)
CMV IGM SERPL IA-ACNC: <30 AU/ML (ref 0–29.9)
CO2 SERPL-SCNC: 20.7 MMOL/L (ref 22–29)
CREAT SERPL-MCNC: 0.64 MG/DL (ref 0.57–1)
DEPRECATED RDW RBC AUTO: 45 FL (ref 37–54)
ERYTHROCYTE [DISTWIDTH] IN BLOOD BY AUTOMATED COUNT: 13.2 % (ref 12.3–15.4)
GFR SERPL CREATININE-BSD FRML MDRD: 86 ML/MIN/1.73
GLOBULIN UR ELPH-MCNC: 2.6 GM/DL
GLUCOSE BLDC GLUCOMTR-MCNC: 81 MG/DL (ref 70–130)
GLUCOSE BLDC GLUCOMTR-MCNC: 83 MG/DL (ref 70–130)
GLUCOSE BLDC GLUCOMTR-MCNC: 89 MG/DL (ref 70–130)
GLUCOSE SERPL-MCNC: 95 MG/DL (ref 65–99)
HCT VFR BLD AUTO: 36.9 % (ref 34–46.6)
HGB BLD-MCNC: 11.5 G/DL (ref 12–15.9)
IGG4 SER-MCNC: 3 MG/DL (ref 2–96)
MCH RBC QN AUTO: 28.9 PG (ref 26.6–33)
MCHC RBC AUTO-ENTMCNC: 31.2 G/DL (ref 31.5–35.7)
MCV RBC AUTO: 92.7 FL (ref 79–97)
PLATELET # BLD AUTO: 188 10*3/MM3 (ref 140–450)
PMV BLD AUTO: 10.7 FL (ref 6–12)
POTASSIUM SERPL-SCNC: 3.2 MMOL/L (ref 3.5–5.2)
PROT SERPL-MCNC: 5.4 G/DL (ref 6–8.5)
RBC # BLD AUTO: 3.98 10*6/MM3 (ref 3.77–5.28)
SODIUM SERPL-SCNC: 136 MMOL/L (ref 136–145)
WBC # BLD AUTO: 6.92 10*3/MM3 (ref 3.4–10.8)

## 2020-09-07 PROCEDURE — 85027 COMPLETE CBC AUTOMATED: CPT | Performed by: INTERNAL MEDICINE

## 2020-09-07 PROCEDURE — 99239 HOSP IP/OBS DSCHRG MGMT >30: CPT | Performed by: INTERNAL MEDICINE

## 2020-09-07 PROCEDURE — 80053 COMPREHEN METABOLIC PANEL: CPT | Performed by: INTERNAL MEDICINE

## 2020-09-07 PROCEDURE — 94799 UNLISTED PULMONARY SVC/PX: CPT

## 2020-09-07 PROCEDURE — 25010000002 HEPARIN (PORCINE) PER 1000 UNITS: Performed by: INTERNAL MEDICINE

## 2020-09-07 PROCEDURE — 25010000002 HYDRALAZINE PER 20 MG: Performed by: INTERNAL MEDICINE

## 2020-09-07 PROCEDURE — 82962 GLUCOSE BLOOD TEST: CPT

## 2020-09-07 RX ORDER — PANTOPRAZOLE SODIUM 40 MG/1
40 TABLET, DELAYED RELEASE ORAL
Status: DISCONTINUED | OUTPATIENT
Start: 2020-09-07 | End: 2020-09-07 | Stop reason: HOSPADM

## 2020-09-07 RX ORDER — DOXYCYCLINE 100 MG/1
100 CAPSULE ORAL 2 TIMES DAILY
Qty: 10 CAPSULE | Refills: 0 | Status: SHIPPED | OUTPATIENT
Start: 2020-09-07 | End: 2020-09-12

## 2020-09-07 RX ORDER — CARVEDILOL 3.12 MG/1
3.12 TABLET ORAL 2 TIMES DAILY WITH MEALS
Qty: 60 TABLET | Refills: 0 | Status: SHIPPED | OUTPATIENT
Start: 2020-09-07 | End: 2022-12-13

## 2020-09-07 RX ORDER — PANTOPRAZOLE SODIUM 40 MG/1
40 TABLET, DELAYED RELEASE ORAL DAILY
Qty: 30 TABLET | Refills: 0 | Status: SHIPPED | OUTPATIENT
Start: 2020-09-07 | End: 2022-12-13

## 2020-09-07 RX ORDER — POTASSIUM CHLORIDE 20 MEQ/1
40 TABLET, EXTENDED RELEASE ORAL ONCE
Status: COMPLETED | OUTPATIENT
Start: 2020-09-07 | End: 2020-09-07

## 2020-09-07 RX ORDER — CEFDINIR 300 MG/1
300 CAPSULE ORAL 2 TIMES DAILY
Qty: 10 CAPSULE | Refills: 0 | Status: SHIPPED | OUTPATIENT
Start: 2020-09-07 | End: 2020-09-12

## 2020-09-07 RX ORDER — DOCUSATE SODIUM 100 MG/1
100 CAPSULE, LIQUID FILLED ORAL 2 TIMES DAILY
Qty: 60 CAPSULE | Refills: 0 | Status: SHIPPED | OUTPATIENT
Start: 2020-09-07 | End: 2022-12-13

## 2020-09-07 RX ORDER — MEGESTROL ACETATE 40 MG/ML
800 SUSPENSION ORAL DAILY
Status: DISCONTINUED | OUTPATIENT
Start: 2020-09-07 | End: 2020-09-07 | Stop reason: HOSPADM

## 2020-09-07 RX ORDER — L.ACID,CASEI/B.ANIMAL/S.THERMO 16B CELL
CAPSULE ORAL DAILY
Qty: 5 CAPSULE | Refills: 0 | Status: SHIPPED | OUTPATIENT
Start: 2020-09-07 | End: 2020-09-12

## 2020-09-07 RX ORDER — MEGESTROL ACETATE 40 MG/ML
800 SUSPENSION ORAL DAILY
Qty: 480 ML | Refills: 0 | Status: SHIPPED | OUTPATIENT
Start: 2020-09-07

## 2020-09-07 RX ADMIN — HYDRALAZINE HYDROCHLORIDE 10 MG: 20 INJECTION INTRAMUSCULAR; INTRAVENOUS at 10:21

## 2020-09-07 RX ADMIN — PANTOPRAZOLE SODIUM 40 MG: 40 INJECTION, POWDER, FOR SOLUTION INTRAVENOUS at 06:15

## 2020-09-07 RX ADMIN — MEGESTROL ACETATE 800 MG: 40 SUSPENSION ORAL at 11:33

## 2020-09-07 RX ADMIN — SODIUM CHLORIDE 75 ML/HR: 9 INJECTION, SOLUTION INTRAVENOUS at 08:20

## 2020-09-07 RX ADMIN — PANTOPRAZOLE SODIUM 40 MG: 40 TABLET, DELAYED RELEASE ORAL at 11:33

## 2020-09-07 RX ADMIN — LISINOPRIL 20 MG: 10 TABLET ORAL at 08:16

## 2020-09-07 RX ADMIN — HEPARIN SODIUM 5000 UNITS: 5000 INJECTION INTRAVENOUS; SUBCUTANEOUS at 06:15

## 2020-09-07 RX ADMIN — SODIUM CHLORIDE, PRESERVATIVE FREE 10 ML: 5 INJECTION INTRAVENOUS at 09:47

## 2020-09-07 RX ADMIN — POLYETHYLENE GLYCOL (3350) 17 G: 17 POWDER, FOR SOLUTION ORAL at 08:19

## 2020-09-07 RX ADMIN — POTASSIUM CHLORIDE 40 MEQ: 20 TABLET, EXTENDED RELEASE ORAL at 11:32

## 2020-09-07 RX ADMIN — DOXYCYCLINE 100 MG: 100 INJECTION, POWDER, LYOPHILIZED, FOR SOLUTION INTRAVENOUS at 06:15

## 2020-09-07 NOTE — DISCHARGE SUMMARY
Discharge Summary:    Date of Admission: 9/4/2020  Date of Discharge:  9/7/2020    PCP: Yuridia Levy APRN    DISCHARGE DIAGNOSIS  -Systemic inflammatory response syndrome, present upon admission and secondary to acute pancreatitis of unclear etiology; no gallstones with mild pericholecystic fluid and distention (see CT report below); symptomatology not consistent with a calculus cholecystitis; CMV IgG slightly elevated and IGG4 and EBV level pending   -Transaminitis with ultrasound evidence of heterogenous echotexture indicating underlying hepatocellular disease; LFTs remain elevated but slowly improving  -Bilateral pulmonary nodules; recommend PCP arrange a repeat CT scan in approximately 3 months  -Bilateral (left greater than right) lower lobe community-acquired pneumonia; recommend repeat chest imaging in 4 to 6 weeks  -Incidentally found left-sided renal mass thought to represent renal cell carcinoma; patient to follow-up with urology in 3 months  -Essential hypertension that is uncontrolled; resume home antihypertensive and I have added carvedilol  -Poor appetite, present upon admission; Megace added  -Probable euthyroid sick syndrome; recommend a repeat TSH in 4 to 8 weeks  -Mild ileus    SECONDARY DIAGNOSES  Past Medical History:   Diagnosis Date   • Hip pain, acute, left    • Hypertension        CONSULTS   Dr. Ascencio-Urology    PROCEDURES PERFORMED  CT abdomen with contrast:  IMPRESSION:  1. Opacified portal vein, splenic vein, and superior mesenteric veins  are unremarkable with no filling defect identified.  2. Less well opacified renal veins are grossly unremarkable with no  obvious filling defect although these areas are limited somewhat due to  contrast bolus between and timing of the exam. Nonetheless no clear  filling defect of the suprahepatic IVC.  3. Mild intrahepatic biliary dilatation and/or mild degree of periportal  edema.  4. Common hepatic duct which is above limits of normal but the  common  bile duct appears within normal limits.  5. Small ascites.  6. 3.5 cm enhancing left renal mass, stable.  7. Persistent mild pancreatitis changes without pseudocyst identified.  No hemorrhage.  8. Left lower lobe pneumonia. CHF. Pleural effusions.    MRCP abdomen:  IMPRESSION:  1. Acute pancreatitis. No pseudocyst or discrete mass identified.  2. Incompletely distended gallbladder. No stones identified.  3. Common hepatic duct which is just above limits of normal and common  bile duct which is at the upper limits of normal. No luminal filling  defect or stricture identified.  4. Main pancreatic duct within normal limits for diameter. No dilatation  or irregularity.  5. Small amounts of ascites.  6. 3.4 cm complex mass upper pole left kidney suspicious for primary  renal cell carcinoma.  7. Cardiomegaly and trace effusions. Other findings above.    HOSPITAL COURSE  Patient is a 89 y.o. female presented to [facility] complaining of abdominal pain.  Please see the admitting history and physical for further details.      Patient admitted to the hospital medicine service. No clear etiology regarding her pancreatitis but cannot rule out the passing of a single stone given mild intrahepatic biliary dilatation and common hepatic duct at the upper limits of normal.  Common bile duct however appeared to be within normal limits.  Patient's smooth muscle antibody was minimally elevated at 21 (0-19 negative) but patient not likely to have autoimmune hepatitis or primary biliary cholangitis.  Patient's CMV IgG was mildly elevated and her IgG4 level is pending but also not likely the underlying etiology of her pancreatitis.  Medically patient is much improved although she remains with a poor appetite.  The patient states that this has been going on for quite some time.  Patient and her daughter are agreeable to initiate an appetite stimulant.  I discussed with 1 of the patient's daughters via telephone today at  "patient's bedside.    Patient found to have an incidentally noted left-sided renal mass thought to be renal cell carcinoma.  She was seen and evaluated by urology and patient/family have agreed for surveillance/serial monitoring at this time.    Patient was found to have a mild bilateral, left greater than right pneumonia and has been treated for community-acquired organisms.  Patient is without respiratory symptoms today and will be transition to oral antibiotics.    Despite resuming patient's home antihypertensive medication, the patient remains quite hypertensive.  The prescription for low-dose carvedilol has been ordered and sent to the pharmacy.  Encouraged patient to monitor her blood pressure 1-2 times daily and record her readings to present to her primary care provider upon follow-up.    Noted to have mild hypokalemia on the day of discharge; patient will be given a one-time dose of potassium chloride and I have requested a repeat CMP in no more than 7 days.  Patient will also require follow-up chest x-ray in approximately 4 to 6 weeks and also recommend to follow-up on her TSH once her acute illnesses have resolved to see if patient may have hypothyroidism that needs to be treated.    Patient was very anxious to be discharged home today and it was felt that she had maximized the benefit of her inpatient hospitalization.  Patient to return home with her family in a hemodynamically stable condition.    Lab Results   Component Value Date    GLUCOSE 95 09/07/2020    BUN 9 09/07/2020    CREATININE 0.64 09/07/2020    EGFRIFNONA 86 09/07/2020    BCR 14.1 09/07/2020    K 3.2 (L) 09/07/2020    CO2 20.7 (L) 09/07/2020    CALCIUM 8.5 09/07/2020    ALBUMIN 2.81 (L) 09/07/2020    AST 53 (H) 09/07/2020     (H) 09/07/2020          ---- weakly positive        CONDITION ON DISCHARGE  Stable.      VITAL SIGNS  /99   Pulse 93   Temp 98.5 °F (36.9 °C) (Oral)   Resp 16   Ht 160 cm (63\")   Wt 54.7 kg (120 lb " "11.2 oz)   SpO2 99%   BMI 21.38 kg/m²   Objective:  General Appearance:  Comfortable, well-appearing and in no acute distress.    Vital signs: (most recent): Blood pressure (!) 196/101, pulse 85, temperature 98.5 °F (36.9 °C), temperature source Oral, resp. rate 16, height 160 cm (63\"), weight 54.7 kg (120 lb 11.2 oz), SpO2 99 %.  Vital signs are normal.    HEENT: Normal HEENT exam.    Lungs:  Normal effort.  Breath sounds clear to auscultation.  No rales, wheezes or rhonchi.    Heart: Normal rate.  S1 normal and S2 normal.  No murmur, gallop or friction rub.   Chest: Symmetric chest wall expansion.   Abdomen: Abdomen is soft and non-distended.  Bowel sounds are normal.   There is no abdominal tenderness.     Extremities: Normal range of motion.  There is no deformity or dependent edema.    Pulses: Distal pulses are intact.    Neurological: Patient is alert and oriented to person, place and time.  Normal strength.    Skin:  Warm and dry.  No rash or ulceration.         Present during visit: OMA Domínguez    DISCHARGE DISPOSITION   Home or Self Care    DISCHARGE MEDICATIONS     Discharge Medications      New Medications      Instructions Start Date   Carvedilol 3.125 mg 3.125 mg, oral 2 Times Daily    cefdinir 300 MG capsule  Commonly known as:  OMNICEF   300 mg, Oral, 2 Times Daily      docusate sodium 100 MG capsule  Commonly known as:  Colace   100 mg, Oral, 2 Times Daily      doxycycline 100 MG capsule  Commonly known as:  MONODOX   100 mg, Oral, 2 Times Daily      Lactobacillus tablet   1 tablet, Oral, Daily      megestrol acetate 400 MG/10ML suspension oral suspension  Commonly known as:  MEGACE   800 mg, Oral, Daily      pantoprazole 40 MG EC tablet  Commonly known as:  PROTONIX   40 mg, Oral, Daily         Continue These Medications      Instructions Start Date   lisinopril-hydrochlorothiazide 20-12.5 MG per tablet  Commonly known as:  PRINZIDE,ZESTORETIC   1 tablet, Oral, Daily             DISCHARGE DIET  " low fat, low cholesterol diet    ACTIVITY AT DISCHARGE   activity as tolerated    Additional Instructions for the Follow-ups that You Need to Schedule     Discharge Follow-up with PCP   As directed       Currently Documented PCP:    Yuridia Levy APRN    PCP Phone Number:    [phone]     Follow Up Details:  hospital follow up in 1 week with CMP in 5-7 days; recommend repeat chest xray in 4-6 weeks         Discharge Follow-up with Specified Provider: Dr. Ascencio; 3 Months   As directed      To:  Dr. Ascencio    Follow Up:  3 Months    Follow Up Details:  follow up renal mass         US Renal Bilateral   Dec 10, 2020      Exam reason:  Left renal mass 3.5 cm in size               TEST  RESULTS PENDING AT DISCHARGE   Order Current Status    EBV Antibody Profile In process    IgG 4 In process    Blood Culture - Blood, Arm, Right Preliminary result    Blood Culture - Blood, Hand, Right Preliminary result             Katharina Hernandez DO  09/07/20  10:20      Time: greater than 30 minutes.

## 2020-09-07 NOTE — NURSING NOTE
Patient and daughter, Ijeoma both informed and stated understanding that patient may have been exposed to COVID 19 while in hospital and that patient needs to self quarantine for at least 2 days when retested on Wednesday, September 9.

## 2020-09-07 NOTE — PLAN OF CARE
Problem: Patient Care Overview  Goal: Plan of Care Review  Outcome: Ongoing (interventions implemented as appropriate)  Flowsheets  Taken 9/6/2020 1614 by Adrianna Jaquez RN  Progress: improving  Taken 9/6/2020 1945 by Rhina Hernandez RN  Plan of Care Reviewed With: patient  Goal: Individualization and Mutuality  Outcome: Ongoing (interventions implemented as appropriate)  Goal: Discharge Needs Assessment  Outcome: Ongoing (interventions implemented as appropriate)  Flowsheets  Taken 9/4/2020 1328 by Nabeel Boyd, RN  Equipment Currently Used at Home: cane, quad;commode  Taken 9/4/2020 1336 by Nabeel Boyd, RN  Transportation Anticipated: family or friend will provide  Patient/Family Anticipated Services at Transition: none  Patient/Family Anticipates Transition to: home  Goal: Interprofessional Rounds/Family Conf  Outcome: Ongoing (interventions implemented as appropriate)     Problem: Fall Risk (Adult)  Goal: Identify Related Risk Factors and Signs and Symptoms  Outcome: Ongoing (interventions implemented as appropriate)  Flowsheets (Taken 9/5/2020 0253)  Related Risk Factors (Fall Risk): history of falls  Signs and Symptoms (Fall Risk): presence of risk factors  Goal: Absence of Fall  Outcome: Ongoing (interventions implemented as appropriate)  Flowsheets (Taken 9/7/2020 0435)  Absence of Fall: making progress toward outcome  Intervention: Monitor/Assist with Self Care  Flowsheets (Taken 9/7/2020 0230)  Activity Assistance Provided: assistance, 1 person  Intervention: Reduce Risk/Promote Restraint Free Environment  Flowsheets (Taken 9/7/2020 0400)  Safety Promotion/Fall Prevention: fall prevention program maintained;safety round/check completed  Environmental Safety Modification: assistive device/personal items within reach;lighting adjusted  Safety/Security Measures: bed alarm set     Problem: Pain, Acute (Adult)  Goal: Identify Related Risk Factors and Signs and Symptoms  Outcome: Ongoing  (interventions implemented as appropriate)  Goal: Acceptable Pain Control/Comfort Level  Outcome: Ongoing (interventions implemented as appropriate)  Flowsheets (Taken 9/7/2020 7996)  Acceptable Pain Control/Comfort Level: making progress toward outcome

## 2020-09-07 NOTE — PLAN OF CARE
Problem: Patient Care Overview  Goal: Plan of Care Review  9/7/2020 1039 by Bambi Velázquez RN  Outcome: Ongoing (interventions implemented as appropriate)  Flowsheets (Taken 9/7/2020 1034)  Outcome Summary: Patient's appetite is poor and she states she doesn't want to eat. Patient has spoke with her daughter on the phone this morning. Patient has been up to bedside commode with x1 assistance.

## 2020-09-08 LAB
EBV NA IGG SER IA-ACNC: 22.7 U/ML (ref 0–17.9)
EBV VCA IGG SER-ACNC: 314 U/ML (ref 0–17.9)
EBV VCA IGM SER-ACNC: <36 U/ML (ref 0–35.9)
INTERPRETATION: ABNORMAL

## 2020-09-09 LAB
BACTERIA SPEC AEROBE CULT: NORMAL
BACTERIA SPEC AEROBE CULT: NORMAL

## 2021-02-04 ENCOUNTER — IMMUNIZATION (OUTPATIENT)
Dept: VACCINE CLINIC | Facility: HOSPITAL | Age: 86
End: 2021-02-04

## 2021-02-04 PROCEDURE — 0001A: CPT | Performed by: INTERNAL MEDICINE

## 2021-02-04 PROCEDURE — 91300 HC SARSCOV02 VAC 30MCG/0.3ML IM: CPT | Performed by: INTERNAL MEDICINE

## 2021-02-25 ENCOUNTER — IMMUNIZATION (OUTPATIENT)
Dept: VACCINE CLINIC | Facility: HOSPITAL | Age: 86
End: 2021-02-25

## 2021-02-25 PROCEDURE — 91300 HC SARSCOV02 VAC 30MCG/0.3ML IM: CPT | Performed by: INTERNAL MEDICINE

## 2021-02-25 PROCEDURE — 0002A: CPT | Performed by: INTERNAL MEDICINE

## 2021-09-20 ENCOUNTER — HOSPITAL ENCOUNTER (OUTPATIENT)
Dept: INFUSION THERAPY | Facility: HOSPITAL | Age: 86
Discharge: HOME | End: 2021-09-20
Admitting: NURSE PRACTITIONER

## 2021-09-20 VITALS
OXYGEN SATURATION: 96 % | HEART RATE: 104 BPM | SYSTOLIC BLOOD PRESSURE: 135 MMHG | TEMPERATURE: 98.3 F | RESPIRATION RATE: 20 BRPM | DIASTOLIC BLOOD PRESSURE: 95 MMHG

## 2021-09-20 DIAGNOSIS — U07.1 COVID-19: Primary | ICD-10-CM

## 2021-09-20 PROCEDURE — 25010000006 INJECTION, CASIRIVIMAB AND IMDEVIMAB, 1200 MG: Performed by: NURSE PRACTITIONER

## 2021-09-20 PROCEDURE — M0243 CASIRIVI AND IMDEVI INFUSION: HCPCS | Performed by: NURSE PRACTITIONER

## 2021-09-20 RX ORDER — DIPHENHYDRAMINE HYDROCHLORIDE 50 MG/ML
50 INJECTION INTRAMUSCULAR; INTRAVENOUS ONCE AS NEEDED
Status: CANCELLED | OUTPATIENT
Start: 2021-09-20

## 2021-09-20 RX ORDER — DIPHENHYDRAMINE HCL 50 MG
50 CAPSULE ORAL ONCE AS NEEDED
Status: CANCELLED | OUTPATIENT
Start: 2021-09-20

## 2021-09-20 RX ORDER — DIPHENHYDRAMINE HCL 50 MG
50 CAPSULE ORAL ONCE AS NEEDED
Status: DISCONTINUED | OUTPATIENT
Start: 2021-09-20 | End: 2021-09-22 | Stop reason: HOSPADM

## 2021-09-20 RX ORDER — METHYLPREDNISOLONE SODIUM SUCCINATE 125 MG/2ML
125 INJECTION, POWDER, LYOPHILIZED, FOR SOLUTION INTRAMUSCULAR; INTRAVENOUS AS NEEDED
Status: DISCONTINUED | OUTPATIENT
Start: 2021-09-20 | End: 2021-09-22 | Stop reason: HOSPADM

## 2021-09-20 RX ORDER — EPINEPHRINE 1 MG/ML
0.3 INJECTION, SOLUTION INTRAMUSCULAR; SUBCUTANEOUS AS NEEDED
Status: CANCELLED | OUTPATIENT
Start: 2021-09-20

## 2021-09-20 RX ORDER — EPINEPHRINE 1 MG/ML
0.3 INJECTION, SOLUTION INTRAMUSCULAR; SUBCUTANEOUS AS NEEDED
Status: DISCONTINUED | OUTPATIENT
Start: 2021-09-20 | End: 2021-09-22 | Stop reason: HOSPADM

## 2021-09-20 RX ORDER — METHYLPREDNISOLONE SODIUM SUCCINATE 125 MG/2ML
125 INJECTION, POWDER, LYOPHILIZED, FOR SOLUTION INTRAMUSCULAR; INTRAVENOUS AS NEEDED
Status: CANCELLED | OUTPATIENT
Start: 2021-09-20

## 2021-09-20 RX ORDER — DIPHENHYDRAMINE HYDROCHLORIDE 50 MG/ML
50 INJECTION INTRAMUSCULAR; INTRAVENOUS ONCE AS NEEDED
Status: DISCONTINUED | OUTPATIENT
Start: 2021-09-20 | End: 2021-09-22 | Stop reason: HOSPADM

## 2021-09-20 RX ADMIN — CASIRIVIMAB AND IMDEVIMAB: 600; 600 INJECTION, SOLUTION, CONCENTRATE INTRAVENOUS at 15:17

## 2021-09-20 NOTE — NURSING NOTE
No s/s of distress. Denies any complaints. Discharge teaching performed. Discharged home with daughter.

## 2021-09-20 NOTE — NURSING NOTE
Pt presented to unit for [facility] infusion. Teaching on medication performed. Covid isolation precautions in use. No distress ntd.

## 2022-04-07 ENCOUNTER — APPOINTMENT (OUTPATIENT)
Dept: CT IMAGING | Facility: HOSPITAL | Age: 87
End: 2022-04-07

## 2022-04-07 ENCOUNTER — HOSPITAL ENCOUNTER (EMERGENCY)
Facility: HOSPITAL | Age: 87
Discharge: ANOTHER HOSPITAL | End: 2022-04-09
Attending: EMERGENCY MEDICINE | Admitting: EMERGENCY MEDICINE

## 2022-04-07 DIAGNOSIS — K80.51 CALCULUS OF BILE DUCT WITHOUT CHOLECYSTITIS WITH OBSTRUCTION: ICD-10-CM

## 2022-04-07 DIAGNOSIS — R79.89 ELEVATED LFTS: Primary | ICD-10-CM

## 2022-04-07 LAB
ALBUMIN SERPL-MCNC: 3.16 G/DL (ref 3.5–5.2)
ALBUMIN/GLOB SERPL: 1.2 G/DL
ALP SERPL-CCNC: 569 U/L (ref 39–117)
ALT SERPL W P-5'-P-CCNC: 139 U/L (ref 1–33)
AMPHET+METHAMPHET UR QL: NEGATIVE
AMPHETAMINES UR QL: NEGATIVE
ANION GAP SERPL CALCULATED.3IONS-SCNC: 12.1 MMOL/L (ref 5–15)
APTT PPP: 25.1 SECONDS (ref 26.5–34.5)
AST SERPL-CCNC: 168 U/L (ref 1–32)
BACTERIA UR QL AUTO: ABNORMAL /HPF
BARBITURATES UR QL SCN: NEGATIVE
BASOPHILS # BLD AUTO: 0.04 10*3/MM3 (ref 0–0.2)
BASOPHILS NFR BLD AUTO: 0.6 % (ref 0–1.5)
BENZODIAZ UR QL SCN: NEGATIVE
BILIRUB SERPL-MCNC: 1.1 MG/DL (ref 0–1.2)
BILIRUB UR QL STRIP: NEGATIVE
BUN SERPL-MCNC: 21 MG/DL (ref 8–23)
BUN/CREAT SERPL: 24.1 (ref 7–25)
BUPRENORPHINE SERPL-MCNC: NEGATIVE NG/ML
CALCIUM SPEC-SCNC: 9.2 MG/DL (ref 8.2–9.6)
CANNABINOIDS SERPL QL: NEGATIVE
CHLORIDE SERPL-SCNC: 100 MMOL/L (ref 98–107)
CK SERPL-CCNC: 13 U/L (ref 20–180)
CLARITY UR: CLEAR
CO2 SERPL-SCNC: 25.9 MMOL/L (ref 22–29)
COCAINE UR QL: NEGATIVE
COLOR UR: YELLOW
CREAT SERPL-MCNC: 0.87 MG/DL (ref 0.57–1)
CRP SERPL-MCNC: 2.28 MG/DL (ref 0–0.5)
D-LACTATE SERPL-SCNC: 1 MMOL/L (ref 0.5–2)
DEPRECATED RDW RBC AUTO: 49.1 FL (ref 37–54)
EGFRCR SERPLBLD CKD-EPI 2021: 61.1 ML/MIN/1.73
EOSINOPHIL # BLD AUTO: 0.78 10*3/MM3 (ref 0–0.4)
EOSINOPHIL NFR BLD AUTO: 12.1 % (ref 0.3–6.2)
ERYTHROCYTE [DISTWIDTH] IN BLOOD BY AUTOMATED COUNT: 14.3 % (ref 12.3–15.4)
ERYTHROCYTE [SEDIMENTATION RATE] IN BLOOD: 19 MM/HR (ref 0–30)
ETHANOL BLD-MCNC: <10 MG/DL (ref 0–10)
ETHANOL UR QL: <0.01 %
FLUAV SUBTYP SPEC NAA+PROBE: NOT DETECTED
FLUBV RNA ISLT QL NAA+PROBE: NOT DETECTED
GLOBULIN UR ELPH-MCNC: 2.5 GM/DL
GLUCOSE SERPL-MCNC: 94 MG/DL (ref 65–99)
GLUCOSE UR STRIP-MCNC: NEGATIVE MG/DL
HCT VFR BLD AUTO: 38.4 % (ref 34–46.6)
HGB BLD-MCNC: 12.5 G/DL (ref 12–15.9)
HGB UR QL STRIP.AUTO: NEGATIVE
HYALINE CASTS UR QL AUTO: ABNORMAL /LPF
IMM GRANULOCYTES # BLD AUTO: 0.04 10*3/MM3 (ref 0–0.05)
IMM GRANULOCYTES NFR BLD AUTO: 0.6 % (ref 0–0.5)
INR PPP: 0.9 (ref 0.9–1.1)
KETONES UR QL STRIP: NEGATIVE
LEUKOCYTE ESTERASE UR QL STRIP.AUTO: ABNORMAL
LYMPHOCYTES # BLD AUTO: 0.9 10*3/MM3 (ref 0.7–3.1)
LYMPHOCYTES NFR BLD AUTO: 13.9 % (ref 19.6–45.3)
MAGNESIUM SERPL-MCNC: 1.8 MG/DL (ref 1.7–2.3)
MCH RBC QN AUTO: 30.6 PG (ref 26.6–33)
MCHC RBC AUTO-ENTMCNC: 32.6 G/DL (ref 31.5–35.7)
MCV RBC AUTO: 93.9 FL (ref 79–97)
METHADONE UR QL SCN: NEGATIVE
MONOCYTES # BLD AUTO: 0.34 10*3/MM3 (ref 0.1–0.9)
MONOCYTES NFR BLD AUTO: 5.3 % (ref 5–12)
NEUTROPHILS NFR BLD AUTO: 4.36 10*3/MM3 (ref 1.7–7)
NEUTROPHILS NFR BLD AUTO: 67.5 % (ref 42.7–76)
NITRITE UR QL STRIP: NEGATIVE
NRBC BLD AUTO-RTO: 0 /100 WBC (ref 0–0.2)
NT-PROBNP SERPL-MCNC: 628.9 PG/ML (ref 0–1800)
OPIATES UR QL: NEGATIVE
OXYCODONE UR QL SCN: NEGATIVE
PCP UR QL SCN: NEGATIVE
PH UR STRIP.AUTO: 7.5 [PH] (ref 5–8)
PLATELET # BLD AUTO: 260 10*3/MM3 (ref 140–450)
PMV BLD AUTO: 10.2 FL (ref 6–12)
POTASSIUM SERPL-SCNC: 3.6 MMOL/L (ref 3.5–5.2)
PROCALCITONIN SERPL-MCNC: 1.81 NG/ML (ref 0–0.25)
PROPOXYPH UR QL: NEGATIVE
PROT SERPL-MCNC: 5.7 G/DL (ref 6–8.5)
PROT UR QL STRIP: NEGATIVE
PROTHROMBIN TIME: 12.3 SECONDS (ref 12.1–14.7)
RBC # BLD AUTO: 4.09 10*6/MM3 (ref 3.77–5.28)
RBC # UR STRIP: ABNORMAL /HPF
REF LAB TEST METHOD: ABNORMAL
SARS-COV-2 RNA PNL SPEC NAA+PROBE: NOT DETECTED
SODIUM SERPL-SCNC: 138 MMOL/L (ref 136–145)
SP GR UR STRIP: 1.01 (ref 1–1.03)
SQUAMOUS #/AREA URNS HPF: ABNORMAL /HPF
T4 FREE SERPL-MCNC: 1.36 NG/DL (ref 0.93–1.7)
TRICYCLICS UR QL SCN: NEGATIVE
TROPONIN T SERPL-MCNC: <0.01 NG/ML (ref 0–0.03)
TROPONIN T SERPL-MCNC: <0.01 NG/ML (ref 0–0.03)
TSH SERPL DL<=0.05 MIU/L-ACNC: 4.83 UIU/ML (ref 0.27–4.2)
UROBILINOGEN UR QL STRIP: ABNORMAL
WBC # UR STRIP: ABNORMAL /HPF
WBC NRBC COR # BLD: 6.46 10*3/MM3 (ref 3.4–10.8)

## 2022-04-07 PROCEDURE — 83735 ASSAY OF MAGNESIUM: CPT | Performed by: EMERGENCY MEDICINE

## 2022-04-07 PROCEDURE — 84443 ASSAY THYROID STIM HORMONE: CPT | Performed by: EMERGENCY MEDICINE

## 2022-04-07 PROCEDURE — 99284 EMERGENCY DEPT VISIT MOD MDM: CPT

## 2022-04-07 PROCEDURE — 96365 THER/PROPH/DIAG IV INF INIT: CPT

## 2022-04-07 PROCEDURE — 80306 DRUG TEST PRSMV INSTRMNT: CPT | Performed by: EMERGENCY MEDICINE

## 2022-04-07 PROCEDURE — 83605 ASSAY OF LACTIC ACID: CPT | Performed by: EMERGENCY MEDICINE

## 2022-04-07 PROCEDURE — 93005 ELECTROCARDIOGRAM TRACING: CPT | Performed by: EMERGENCY MEDICINE

## 2022-04-07 PROCEDURE — 85025 COMPLETE CBC W/AUTO DIFF WBC: CPT | Performed by: EMERGENCY MEDICINE

## 2022-04-07 PROCEDURE — 85652 RBC SED RATE AUTOMATED: CPT | Performed by: EMERGENCY MEDICINE

## 2022-04-07 PROCEDURE — 85610 PROTHROMBIN TIME: CPT | Performed by: EMERGENCY MEDICINE

## 2022-04-07 PROCEDURE — 93010 ELECTROCARDIOGRAM REPORT: CPT | Performed by: INTERNAL MEDICINE

## 2022-04-07 PROCEDURE — 86140 C-REACTIVE PROTEIN: CPT | Performed by: EMERGENCY MEDICINE

## 2022-04-07 PROCEDURE — 84439 ASSAY OF FREE THYROXINE: CPT | Performed by: EMERGENCY MEDICINE

## 2022-04-07 PROCEDURE — P9612 CATHETERIZE FOR URINE SPEC: HCPCS

## 2022-04-07 PROCEDURE — 25010000002 PIPERACILLIN SOD-TAZOBACTAM PER 1 G: Performed by: EMERGENCY MEDICINE

## 2022-04-07 PROCEDURE — 84484 ASSAY OF TROPONIN QUANT: CPT | Performed by: EMERGENCY MEDICINE

## 2022-04-07 PROCEDURE — 87040 BLOOD CULTURE FOR BACTERIA: CPT | Performed by: EMERGENCY MEDICINE

## 2022-04-07 PROCEDURE — 82077 ASSAY SPEC XCP UR&BREATH IA: CPT | Performed by: EMERGENCY MEDICINE

## 2022-04-07 PROCEDURE — 83880 ASSAY OF NATRIURETIC PEPTIDE: CPT | Performed by: EMERGENCY MEDICINE

## 2022-04-07 PROCEDURE — 84145 PROCALCITONIN (PCT): CPT | Performed by: EMERGENCY MEDICINE

## 2022-04-07 PROCEDURE — 80053 COMPREHEN METABOLIC PANEL: CPT | Performed by: EMERGENCY MEDICINE

## 2022-04-07 PROCEDURE — 74176 CT ABD & PELVIS W/O CONTRAST: CPT

## 2022-04-07 PROCEDURE — 82550 ASSAY OF CK (CPK): CPT | Performed by: EMERGENCY MEDICINE

## 2022-04-07 PROCEDURE — 71250 CT THORAX DX C-: CPT

## 2022-04-07 PROCEDURE — 81001 URINALYSIS AUTO W/SCOPE: CPT | Performed by: EMERGENCY MEDICINE

## 2022-04-07 PROCEDURE — 85730 THROMBOPLASTIN TIME PARTIAL: CPT | Performed by: EMERGENCY MEDICINE

## 2022-04-07 PROCEDURE — 87086 URINE CULTURE/COLONY COUNT: CPT | Performed by: EMERGENCY MEDICINE

## 2022-04-07 PROCEDURE — 87636 SARSCOV2 & INF A&B AMP PRB: CPT | Performed by: EMERGENCY MEDICINE

## 2022-04-07 RX ORDER — SODIUM CHLORIDE 0.9 % (FLUSH) 0.9 %
10 SYRINGE (ML) INJECTION AS NEEDED
Status: DISCONTINUED | OUTPATIENT
Start: 2022-04-07 | End: 2022-04-10 | Stop reason: HOSPADM

## 2022-04-07 RX ADMIN — PIPERACILLIN SODIUM AND TAZOBACTAM SODIUM 4.5 G: 4; .5 INJECTION, POWDER, LYOPHILIZED, FOR SOLUTION INTRAVENOUS at 22:54

## 2022-04-07 RX ADMIN — SODIUM CHLORIDE 1000 ML: 9 INJECTION, SOLUTION INTRAVENOUS at 18:57

## 2022-04-08 LAB
ALBUMIN SERPL-MCNC: 2.75 G/DL (ref 3.5–5.2)
ALBUMIN/GLOB SERPL: 1.1 G/DL
ALP SERPL-CCNC: 514 U/L (ref 39–117)
ALT SERPL W P-5'-P-CCNC: 176 U/L (ref 1–33)
ANION GAP SERPL CALCULATED.3IONS-SCNC: 11.3 MMOL/L (ref 5–15)
AST SERPL-CCNC: 150 U/L (ref 1–32)
BASOPHILS # BLD AUTO: 0.05 10*3/MM3 (ref 0–0.2)
BASOPHILS NFR BLD AUTO: 1 % (ref 0–1.5)
BILIRUB SERPL-MCNC: 1 MG/DL (ref 0–1.2)
BUN SERPL-MCNC: 16 MG/DL (ref 8–23)
BUN/CREAT SERPL: 20.3 (ref 7–25)
CALCIUM SPEC-SCNC: 8.8 MG/DL (ref 8.2–9.6)
CHLORIDE SERPL-SCNC: 100 MMOL/L (ref 98–107)
CO2 SERPL-SCNC: 21.7 MMOL/L (ref 22–29)
CREAT SERPL-MCNC: 0.79 MG/DL (ref 0.57–1)
DEPRECATED RDW RBC AUTO: 50.9 FL (ref 37–54)
EGFRCR SERPLBLD CKD-EPI 2021: 68.6 ML/MIN/1.73
EOSINOPHIL # BLD AUTO: 0.6 10*3/MM3 (ref 0–0.4)
EOSINOPHIL NFR BLD AUTO: 11.9 % (ref 0.3–6.2)
ERYTHROCYTE [DISTWIDTH] IN BLOOD BY AUTOMATED COUNT: 14.4 % (ref 12.3–15.4)
GLOBULIN UR ELPH-MCNC: 2.6 GM/DL
GLUCOSE SERPL-MCNC: 64 MG/DL (ref 65–99)
HCT VFR BLD AUTO: 35.4 % (ref 34–46.6)
HGB BLD-MCNC: 11.4 G/DL (ref 12–15.9)
IMM GRANULOCYTES # BLD AUTO: 0.02 10*3/MM3 (ref 0–0.05)
IMM GRANULOCYTES NFR BLD AUTO: 0.4 % (ref 0–0.5)
LYMPHOCYTES # BLD AUTO: 0.92 10*3/MM3 (ref 0.7–3.1)
LYMPHOCYTES NFR BLD AUTO: 18.2 % (ref 19.6–45.3)
MCH RBC QN AUTO: 31 PG (ref 26.6–33)
MCHC RBC AUTO-ENTMCNC: 32.2 G/DL (ref 31.5–35.7)
MCV RBC AUTO: 96.2 FL (ref 79–97)
MONOCYTES # BLD AUTO: 0.26 10*3/MM3 (ref 0.1–0.9)
MONOCYTES NFR BLD AUTO: 5.1 % (ref 5–12)
NEUTROPHILS NFR BLD AUTO: 3.2 10*3/MM3 (ref 1.7–7)
NEUTROPHILS NFR BLD AUTO: 63.4 % (ref 42.7–76)
NRBC BLD AUTO-RTO: 0 /100 WBC (ref 0–0.2)
PLATELET # BLD AUTO: 252 10*3/MM3 (ref 140–450)
PMV BLD AUTO: 10.3 FL (ref 6–12)
POTASSIUM SERPL-SCNC: 3.3 MMOL/L (ref 3.5–5.2)
PROT SERPL-MCNC: 5.3 G/DL (ref 6–8.5)
QT INTERVAL: 372 MS
QTC INTERVAL: 460 MS
RBC # BLD AUTO: 3.68 10*6/MM3 (ref 3.77–5.28)
SODIUM SERPL-SCNC: 133 MMOL/L (ref 136–145)
WBC NRBC COR # BLD: 5.05 10*3/MM3 (ref 3.4–10.8)

## 2022-04-08 PROCEDURE — 96361 HYDRATE IV INFUSION ADD-ON: CPT

## 2022-04-08 PROCEDURE — 96366 THER/PROPH/DIAG IV INF ADDON: CPT

## 2022-04-08 PROCEDURE — 80053 COMPREHEN METABOLIC PANEL: CPT | Performed by: STUDENT IN AN ORGANIZED HEALTH CARE EDUCATION/TRAINING PROGRAM

## 2022-04-08 PROCEDURE — 85025 COMPLETE CBC W/AUTO DIFF WBC: CPT | Performed by: STUDENT IN AN ORGANIZED HEALTH CARE EDUCATION/TRAINING PROGRAM

## 2022-04-08 PROCEDURE — 25010000002 PIPERACILLIN SOD-TAZOBACTAM PER 1 G: Performed by: EMERGENCY MEDICINE

## 2022-04-08 RX ORDER — POTASSIUM CHLORIDE 20 MEQ/1
20 TABLET, EXTENDED RELEASE ORAL ONCE
Status: DISCONTINUED | OUTPATIENT
Start: 2022-04-08 | End: 2022-04-08

## 2022-04-08 RX ORDER — SODIUM CHLORIDE 9 MG/ML
100 INJECTION, SOLUTION INTRAVENOUS CONTINUOUS
Status: DISCONTINUED | OUTPATIENT
Start: 2022-04-08 | End: 2022-04-10 | Stop reason: HOSPADM

## 2022-04-08 RX ORDER — DEXTROSE, SODIUM CHLORIDE, AND POTASSIUM CHLORIDE 5; .45; .15 G/100ML; G/100ML; G/100ML
100 INJECTION INTRAVENOUS CONTINUOUS
Status: DISCONTINUED | OUTPATIENT
Start: 2022-04-08 | End: 2022-04-10 | Stop reason: HOSPADM

## 2022-04-08 RX ADMIN — SODIUM CHLORIDE 100 ML/HR: 9 INJECTION, SOLUTION INTRAVENOUS at 10:02

## 2022-04-08 RX ADMIN — POTASSIUM CHLORIDE, DEXTROSE MONOHYDRATE AND SODIUM CHLORIDE 100 ML/HR: 150; 5; 450 INJECTION, SOLUTION INTRAVENOUS at 20:21

## 2022-04-08 RX ADMIN — PIPERACILLIN SODIUM AND TAZOBACTAM SODIUM 3.38 G: 3; .375 INJECTION, POWDER, LYOPHILIZED, FOR SOLUTION INTRAVENOUS at 21:04

## 2022-04-08 NOTE — ED NOTES
Called [facility] for an update on patient's bed status, spoke with Wilian, stated the patient did not have a bed assignment at this time and could not given a list placement due to their lists being based on acuity. Provider and lead RN made aware.

## 2022-04-08 NOTE — ED NOTES
Called [facility] to check on bed assignment. They advised they do not have a bed at this time and will keep us updated.

## 2022-04-08 NOTE — ED NOTES
Called [facility] to check on bed assignment, they advised they still do not have a bed but will keep us updated.

## 2022-04-08 NOTE — ED NOTES
Per Dr. Arana, patient has been accepted @[facility]. They states they hope to have a bed in the morning.

## 2022-04-08 NOTE — ED NOTES
Called [facility] spoke with Alejandra for transfer to [facility]. SHe will call us back with Dr. Rizzo

## 2022-04-09 VITALS
SYSTOLIC BLOOD PRESSURE: 159 MMHG | DIASTOLIC BLOOD PRESSURE: 90 MMHG | OXYGEN SATURATION: 98 % | WEIGHT: 102 LBS | RESPIRATION RATE: 18 BRPM | HEART RATE: 71 BPM | TEMPERATURE: 98.6 F | HEIGHT: 64 IN | BODY MASS INDEX: 17.42 KG/M2

## 2022-04-09 LAB
ALBUMIN SERPL-MCNC: 2.62 G/DL (ref 3.5–5.2)
ALBUMIN/GLOB SERPL: 1.1 G/DL
ALP SERPL-CCNC: 434 U/L (ref 39–117)
ALT SERPL W P-5'-P-CCNC: 133 U/L (ref 1–33)
ANION GAP SERPL CALCULATED.3IONS-SCNC: 8.4 MMOL/L (ref 5–15)
AST SERPL-CCNC: 79 U/L (ref 1–32)
BACTERIA SPEC AEROBE CULT: NO GROWTH
BASOPHILS # BLD AUTO: 0.04 10*3/MM3 (ref 0–0.2)
BASOPHILS NFR BLD AUTO: 0.8 % (ref 0–1.5)
BILIRUB SERPL-MCNC: 0.7 MG/DL (ref 0–1.2)
BUN SERPL-MCNC: 13 MG/DL (ref 8–23)
BUN/CREAT SERPL: 17.1 (ref 7–25)
CALCIUM SPEC-SCNC: 8.6 MG/DL (ref 8.2–9.6)
CHLORIDE SERPL-SCNC: 106 MMOL/L (ref 98–107)
CO2 SERPL-SCNC: 23.6 MMOL/L (ref 22–29)
CREAT SERPL-MCNC: 0.76 MG/DL (ref 0.57–1)
DEPRECATED RDW RBC AUTO: 49.4 FL (ref 37–54)
EGFRCR SERPLBLD CKD-EPI 2021: 71.8 ML/MIN/1.73
EOSINOPHIL # BLD AUTO: 0.74 10*3/MM3 (ref 0–0.4)
EOSINOPHIL NFR BLD AUTO: 15.4 % (ref 0.3–6.2)
ERYTHROCYTE [DISTWIDTH] IN BLOOD BY AUTOMATED COUNT: 14.3 % (ref 12.3–15.4)
GLOBULIN UR ELPH-MCNC: 2.3 GM/DL
GLUCOSE SERPL-MCNC: 108 MG/DL (ref 65–99)
HCT VFR BLD AUTO: 34 % (ref 34–46.6)
HGB BLD-MCNC: 11 G/DL (ref 12–15.9)
IMM GRANULOCYTES # BLD AUTO: 0.02 10*3/MM3 (ref 0–0.05)
IMM GRANULOCYTES NFR BLD AUTO: 0.4 % (ref 0–0.5)
LYMPHOCYTES # BLD AUTO: 0.81 10*3/MM3 (ref 0.7–3.1)
LYMPHOCYTES NFR BLD AUTO: 16.9 % (ref 19.6–45.3)
MCH RBC QN AUTO: 30.3 PG (ref 26.6–33)
MCHC RBC AUTO-ENTMCNC: 32.4 G/DL (ref 31.5–35.7)
MCV RBC AUTO: 93.7 FL (ref 79–97)
MONOCYTES # BLD AUTO: 0.31 10*3/MM3 (ref 0.1–0.9)
MONOCYTES NFR BLD AUTO: 6.5 % (ref 5–12)
NEUTROPHILS NFR BLD AUTO: 2.87 10*3/MM3 (ref 1.7–7)
NEUTROPHILS NFR BLD AUTO: 60 % (ref 42.7–76)
NRBC BLD AUTO-RTO: 0 /100 WBC (ref 0–0.2)
PLATELET # BLD AUTO: 237 10*3/MM3 (ref 140–450)
PMV BLD AUTO: 9.9 FL (ref 6–12)
POTASSIUM SERPL-SCNC: 3.4 MMOL/L (ref 3.5–5.2)
PROT SERPL-MCNC: 4.9 G/DL (ref 6–8.5)
RBC # BLD AUTO: 3.63 10*6/MM3 (ref 3.77–5.28)
SODIUM SERPL-SCNC: 138 MMOL/L (ref 136–145)
WBC NRBC COR # BLD: 4.79 10*3/MM3 (ref 3.4–10.8)

## 2022-04-09 PROCEDURE — 25010000002 PIPERACILLIN SOD-TAZOBACTAM PER 1 G: Performed by: EMERGENCY MEDICINE

## 2022-04-09 PROCEDURE — 96366 THER/PROPH/DIAG IV INF ADDON: CPT

## 2022-04-09 PROCEDURE — 25010000002 HEPARIN (PORCINE) PER 1000 UNITS: Performed by: STUDENT IN AN ORGANIZED HEALTH CARE EDUCATION/TRAINING PROGRAM

## 2022-04-09 PROCEDURE — 96375 TX/PRO/DX INJ NEW DRUG ADDON: CPT

## 2022-04-09 PROCEDURE — 96372 THER/PROPH/DIAG INJ SC/IM: CPT

## 2022-04-09 PROCEDURE — 36415 COLL VENOUS BLD VENIPUNCTURE: CPT

## 2022-04-09 PROCEDURE — 80053 COMPREHEN METABOLIC PANEL: CPT | Performed by: STUDENT IN AN ORGANIZED HEALTH CARE EDUCATION/TRAINING PROGRAM

## 2022-04-09 PROCEDURE — 25010000002 MORPHINE PER 10 MG: Performed by: STUDENT IN AN ORGANIZED HEALTH CARE EDUCATION/TRAINING PROGRAM

## 2022-04-09 PROCEDURE — 25010000002 ONDANSETRON PER 1 MG: Performed by: STUDENT IN AN ORGANIZED HEALTH CARE EDUCATION/TRAINING PROGRAM

## 2022-04-09 PROCEDURE — 96361 HYDRATE IV INFUSION ADD-ON: CPT

## 2022-04-09 PROCEDURE — 85025 COMPLETE CBC W/AUTO DIFF WBC: CPT | Performed by: STUDENT IN AN ORGANIZED HEALTH CARE EDUCATION/TRAINING PROGRAM

## 2022-04-09 RX ORDER — ONDANSETRON 2 MG/ML
4 INJECTION INTRAMUSCULAR; INTRAVENOUS ONCE
Status: COMPLETED | OUTPATIENT
Start: 2022-04-09 | End: 2022-04-09

## 2022-04-09 RX ORDER — CARVEDILOL 3.12 MG/1
3.12 TABLET ORAL ONCE
Status: DISCONTINUED | OUTPATIENT
Start: 2022-04-09 | End: 2022-04-10 | Stop reason: HOSPADM

## 2022-04-09 RX ORDER — HEPARIN SODIUM 5000 [USP'U]/ML
5000 INJECTION, SOLUTION INTRAVENOUS; SUBCUTANEOUS EVERY 8 HOURS SCHEDULED
Status: DISCONTINUED | OUTPATIENT
Start: 2022-04-09 | End: 2022-04-10 | Stop reason: HOSPADM

## 2022-04-09 RX ORDER — MORPHINE SULFATE 2 MG/ML
2 INJECTION, SOLUTION INTRAMUSCULAR; INTRAVENOUS ONCE
Status: COMPLETED | OUTPATIENT
Start: 2022-04-09 | End: 2022-04-09

## 2022-04-09 RX ADMIN — POTASSIUM CHLORIDE, DEXTROSE MONOHYDRATE AND SODIUM CHLORIDE 100 ML/HR: 150; 5; 450 INJECTION, SOLUTION INTRAVENOUS at 07:20

## 2022-04-09 RX ADMIN — HEPARIN SODIUM 5000 UNITS: 5000 INJECTION INTRAVENOUS; SUBCUTANEOUS at 08:39

## 2022-04-09 RX ADMIN — PIPERACILLIN SODIUM AND TAZOBACTAM SODIUM 3.38 G: 3; .375 INJECTION, POWDER, LYOPHILIZED, FOR SOLUTION INTRAVENOUS at 20:58

## 2022-04-09 RX ADMIN — MORPHINE SULFATE 2 MG: 2 INJECTION, SOLUTION INTRAMUSCULAR; INTRAVENOUS at 12:44

## 2022-04-09 RX ADMIN — HEPARIN SODIUM 5000 UNITS: 5000 INJECTION INTRAVENOUS; SUBCUTANEOUS at 14:13

## 2022-04-09 RX ADMIN — HEPARIN SODIUM 5000 UNITS: 5000 INJECTION INTRAVENOUS; SUBCUTANEOUS at 21:36

## 2022-04-09 RX ADMIN — PIPERACILLIN SODIUM AND TAZOBACTAM SODIUM 3.38 G: 3; .375 INJECTION, POWDER, LYOPHILIZED, FOR SOLUTION INTRAVENOUS at 08:40

## 2022-04-09 RX ADMIN — ONDANSETRON 4 MG: 2 INJECTION INTRAMUSCULAR; INTRAVENOUS at 12:40

## 2022-04-09 NOTE — ED NOTES
Mason [facility] with [facility] called back. He accepted transfer. He will send a truck shortly.

## 2022-04-09 NOTE — ED PROVIDER NOTES
Subjective   89-year-old female past medical history of supposedly renal cell carcinoma family decided they did not want to do any intervention at the time in 2020 when the patient was diagnosed, patient was coming in with 2 days of left flank pain, and generalized weakness.  Also is coming in with a few days of constipation they gave enemas at home and had a large bowel movement prior to arrival.  Denies any falls or trauma.          Review of Systems   Unable to perform ROS: Age   All other systems reviewed and are negative.      Past Medical History:   Diagnosis Date   • Hip pain, acute, left    • Hypertension        No Known Allergies    History reviewed. No pertinent surgical history.    History reviewed. No pertinent family history.    Social History     Socioeconomic History   • Marital status:    Tobacco Use   • Smoking status: Never Smoker   • Smokeless tobacco: Never Used   Substance and Sexual Activity   • Alcohol use: No   • Drug use: No   • Sexual activity: Defer           Objective   Physical Exam  Vitals and nursing note reviewed.   Constitutional:       General: She is not in acute distress.  HENT:      Head: Normocephalic and atraumatic.      Mouth/Throat:      Mouth: Mucous membranes are moist.   Eyes:      Extraocular Movements: Extraocular movements intact.      Pupils: Pupils are equal, round, and reactive to light.   Cardiovascular:      Rate and Rhythm: Normal rate and regular rhythm.      Heart sounds: Normal heart sounds. No murmur heard.    No friction rub. No gallop.   Pulmonary:      Effort: Pulmonary effort is normal.      Breath sounds: Normal breath sounds. No wheezing, rhonchi or rales.   Chest:      Chest wall: No tenderness.   Abdominal:      General: Abdomen is flat. Bowel sounds are normal. There is no distension.      Palpations: Abdomen is soft. There is no mass.      Tenderness: There is no abdominal tenderness. There is no right CVA tenderness, left CVA tenderness,  guarding or rebound.      Hernia: No hernia is present.   Skin:     General: Skin is warm and dry.      Capillary Refill: Capillary refill takes less than 2 seconds.      Findings: No rash.   Neurological:      General: No focal deficit present.      Mental Status: She is alert and oriented to person, place, and time.         Procedures           ED Course  ED Course as of 04/12/22 0108   Thu Apr 07, 2022   2108 ECG 12 Lead  Vent. rate 92 BPM  MD interval 140 ms  QRS duration 114 ms  QT/QTcB 372/460 ms  P-R-T axes 26 -71 -16  Normal sinus rhythm  Left axis deviation  Right bundle branch block  Inferior infarct (cited on or before 15-OCT-2017)  Abnormal ECG  When compared with ECG of 04-SEP-2020 07:23,  Right bundle branch block is now present [ES]   2215 CT Chest Without Contrast Diagnostic  IMPRESSION:  No active disease in the chest. [ES]   2215 CT Abdomen Pelvis Without Contrast  IMPRESSION:  1.  Choledocholithiasis with marked intrahepatic and extrahepatic bile duct dilatation. Two large gallstones in the distal common bile duct. Large stone at the dominick hepatis either within the age to be or cystic duct. No gallbladder distention.  2.  Known exophytic left upper pole renal cell carcinoma with adjacent infiltration of perinephric fat is unchanged since the previous exam. [ES]   Fri Apr 08, 2022   1838 Patient was monitored throughout the day.  No changes in medical status.  Bed status still pending with transfer to [facility]. [SF]   Sat Apr 09, 2022   0752 Patient remains at this facility at this point in time and her reassumed care.  Patient will be started on prophylactic Keppra and for DVT prophylaxis. [SF]   1554 Bed became available at the [facility].  Patient's vitals are remained stable.  Daily labs continue to improve.  Patient was agreeable to transfer plan.  Vitals stable transfer [SF]      ED Course User Index  [ES] Pedro Luis Arana MD  [SF] Contreras Morales DO                                                  MDM  Number of Diagnoses or Management Options     Amount and/or Complexity of Data Reviewed  Clinical lab tests: reviewed  Tests in the radiology section of CPT®: reviewed  Tests in the medicine section of CPT®: reviewed        Final diagnoses:   Elevated LFTs   Calculus of bile duct without cholecystitis with obstruction       ED Disposition  ED Disposition     ED Disposition   Transfer to Another Facility     Condition   --    Comment   --             No follow-up provider specified.       Medication List      No changes were made to your prescriptions during this visit.          Miguel Souza MD  04/09/22 0526       Miguel Souza MD  04/12/22 0108

## 2022-04-09 NOTE — ED NOTES
Mason called with [facility] and [facility] has a transfer that they have to take. But will keep her on the list.

## 2022-04-09 NOTE — ED NOTES
Called [facility] to get update on bed. Lady said there would be no bed available today. Took vitals information to update their system.

## 2022-04-09 NOTE — ED NOTES
Received call from  Transfer Center, bed assignment is at [facility] 4th floor room 404. Report number is [phone]. Patient and family notified of bed assignment.

## 2022-04-09 NOTE — ED NOTES
Called [facility] for a GI on call. Maria R martinez  do have one asking for call back for transfer. Dr Matthias AMADO

## 2022-04-09 NOTE — ED NOTES
Spoke with Tiana with [facility], they are declining all transfers due to only having 2 trucks for the county.

## 2022-04-10 NOTE — ED NOTES
Called [facility] spoke with Hallie for transport to [facility]. Placed on hold while she has them do a weather check.

## 2022-04-10 NOTE — ED NOTES
Notified house supervisor Omer HALL will not transport patient at this time. Advised I will call her back when I have a truck again.

## 2022-04-10 NOTE — ED NOTES
Called [facility] spoke with Nirav for BLS transfer to [facility]. He states he does not have any trucks that he can spare.

## 2022-04-10 NOTE — ED NOTES
OIC with [facility] called back. He can not do transport at this time, but will keep the patient on the list for a truck.

## 2022-04-12 LAB
BACTERIA SPEC AEROBE CULT: NORMAL
BACTERIA SPEC AEROBE CULT: NORMAL

## 2022-06-06 ENCOUNTER — HOSPITAL ENCOUNTER (EMERGENCY)
Facility: HOSPITAL | Age: 87
Discharge: HOME | End: 2022-06-06
Attending: STUDENT IN AN ORGANIZED HEALTH CARE EDUCATION/TRAINING PROGRAM | Admitting: STUDENT IN AN ORGANIZED HEALTH CARE EDUCATION/TRAINING PROGRAM

## 2022-06-06 ENCOUNTER — APPOINTMENT (OUTPATIENT)
Dept: CT IMAGING | Facility: HOSPITAL | Age: 87
End: 2022-06-06

## 2022-06-06 VITALS
DIASTOLIC BLOOD PRESSURE: 71 MMHG | OXYGEN SATURATION: 96 % | BODY MASS INDEX: 15.64 KG/M2 | HEIGHT: 62 IN | WEIGHT: 85 LBS | SYSTOLIC BLOOD PRESSURE: 102 MMHG | TEMPERATURE: 97.3 F | HEART RATE: 74 BPM | RESPIRATION RATE: 18 BRPM

## 2022-06-06 DIAGNOSIS — R60.0 LOWER EXTREMITY EDEMA: Primary | ICD-10-CM

## 2022-06-06 DIAGNOSIS — N28.89 RENAL MASS: ICD-10-CM

## 2022-06-06 LAB
ALBUMIN SERPL-MCNC: 3.12 G/DL (ref 3.5–5.2)
ALBUMIN/GLOB SERPL: 1.4 G/DL
ALP SERPL-CCNC: 74 U/L (ref 39–117)
ALT SERPL W P-5'-P-CCNC: 8 U/L (ref 1–33)
ANION GAP SERPL CALCULATED.3IONS-SCNC: 8.2 MMOL/L (ref 5–15)
AST SERPL-CCNC: 17 U/L (ref 1–32)
BACTERIA UR QL AUTO: ABNORMAL /HPF
BASOPHILS # BLD AUTO: 0.03 10*3/MM3 (ref 0–0.2)
BASOPHILS NFR BLD AUTO: 0.6 % (ref 0–1.5)
BILIRUB SERPL-MCNC: 0.3 MG/DL (ref 0–1.2)
BILIRUB UR QL STRIP: NEGATIVE
BUN SERPL-MCNC: 19 MG/DL (ref 8–23)
BUN/CREAT SERPL: 19 (ref 7–25)
CALCIUM SPEC-SCNC: 9.2 MG/DL (ref 8.2–9.6)
CHLORIDE SERPL-SCNC: 103 MMOL/L (ref 98–107)
CLARITY UR: CLEAR
CO2 SERPL-SCNC: 25.8 MMOL/L (ref 22–29)
COLOR UR: YELLOW
CREAT SERPL-MCNC: 1 MG/DL (ref 0.57–1)
CRP SERPL-MCNC: <0.3 MG/DL (ref 0–0.5)
DEPRECATED RDW RBC AUTO: 49 FL (ref 37–54)
EGFRCR SERPLBLD CKD-EPI 2021: 51.7 ML/MIN/1.73
EOSINOPHIL # BLD AUTO: 0.53 10*3/MM3 (ref 0–0.4)
EOSINOPHIL NFR BLD AUTO: 10.5 % (ref 0.3–6.2)
ERYTHROCYTE [DISTWIDTH] IN BLOOD BY AUTOMATED COUNT: 14.3 % (ref 12.3–15.4)
GLOBULIN UR ELPH-MCNC: 2.3 GM/DL
GLUCOSE SERPL-MCNC: 98 MG/DL (ref 65–99)
GLUCOSE UR STRIP-MCNC: NEGATIVE MG/DL
HCT VFR BLD AUTO: 36.5 % (ref 34–46.6)
HGB BLD-MCNC: 11.8 G/DL (ref 12–15.9)
HGB UR QL STRIP.AUTO: NEGATIVE
HYALINE CASTS UR QL AUTO: ABNORMAL /LPF
IMM GRANULOCYTES # BLD AUTO: 0.01 10*3/MM3 (ref 0–0.05)
IMM GRANULOCYTES NFR BLD AUTO: 0.2 % (ref 0–0.5)
KETONES UR QL STRIP: NEGATIVE
LEUKOCYTE ESTERASE UR QL STRIP.AUTO: ABNORMAL
LYMPHOCYTES # BLD AUTO: 1.33 10*3/MM3 (ref 0.7–3.1)
LYMPHOCYTES NFR BLD AUTO: 26.3 % (ref 19.6–45.3)
MCH RBC QN AUTO: 30.6 PG (ref 26.6–33)
MCHC RBC AUTO-ENTMCNC: 32.3 G/DL (ref 31.5–35.7)
MCV RBC AUTO: 94.6 FL (ref 79–97)
MONOCYTES # BLD AUTO: 0.46 10*3/MM3 (ref 0.1–0.9)
MONOCYTES NFR BLD AUTO: 9.1 % (ref 5–12)
NEUTROPHILS NFR BLD AUTO: 2.7 10*3/MM3 (ref 1.7–7)
NEUTROPHILS NFR BLD AUTO: 53.3 % (ref 42.7–76)
NITRITE UR QL STRIP: NEGATIVE
NRBC BLD AUTO-RTO: 0 /100 WBC (ref 0–0.2)
NT-PROBNP SERPL-MCNC: 578.6 PG/ML (ref 0–1800)
PH UR STRIP.AUTO: 6 [PH] (ref 5–8)
PLATELET # BLD AUTO: 163 10*3/MM3 (ref 140–450)
PMV BLD AUTO: 10.3 FL (ref 6–12)
POTASSIUM SERPL-SCNC: 4 MMOL/L (ref 3.5–5.2)
PROT SERPL-MCNC: 5.4 G/DL (ref 6–8.5)
PROT UR QL STRIP: NEGATIVE
QT INTERVAL: 370 MS
QTC INTERVAL: 455 MS
RBC # BLD AUTO: 3.86 10*6/MM3 (ref 3.77–5.28)
RBC # UR STRIP: ABNORMAL /HPF
REF LAB TEST METHOD: ABNORMAL
SODIUM SERPL-SCNC: 137 MMOL/L (ref 136–145)
SP GR UR STRIP: 1.02 (ref 1–1.03)
SQUAMOUS #/AREA URNS HPF: ABNORMAL /HPF
UROBILINOGEN UR QL STRIP: ABNORMAL
WBC # UR STRIP: ABNORMAL /HPF
WBC NRBC COR # BLD: 5.06 10*3/MM3 (ref 3.4–10.8)

## 2022-06-06 PROCEDURE — 74176 CT ABD & PELVIS W/O CONTRAST: CPT | Performed by: RADIOLOGY

## 2022-06-06 PROCEDURE — 80053 COMPREHEN METABOLIC PANEL: CPT | Performed by: NURSE PRACTITIONER

## 2022-06-06 PROCEDURE — 99283 EMERGENCY DEPT VISIT LOW MDM: CPT

## 2022-06-06 PROCEDURE — 81001 URINALYSIS AUTO W/SCOPE: CPT | Performed by: NURSE PRACTITIONER

## 2022-06-06 PROCEDURE — 93005 ELECTROCARDIOGRAM TRACING: CPT | Performed by: NURSE PRACTITIONER

## 2022-06-06 PROCEDURE — 85025 COMPLETE CBC W/AUTO DIFF WBC: CPT | Performed by: NURSE PRACTITIONER

## 2022-06-06 PROCEDURE — 86140 C-REACTIVE PROTEIN: CPT | Performed by: NURSE PRACTITIONER

## 2022-06-06 PROCEDURE — 74176 CT ABD & PELVIS W/O CONTRAST: CPT

## 2022-06-06 PROCEDURE — 36415 COLL VENOUS BLD VENIPUNCTURE: CPT

## 2022-06-06 PROCEDURE — 83880 ASSAY OF NATRIURETIC PEPTIDE: CPT | Performed by: NURSE PRACTITIONER

## 2022-06-06 RX ORDER — FUROSEMIDE 20 MG/1
20 TABLET ORAL DAILY
Qty: 3 TABLET | Refills: 0 | Status: SHIPPED | OUTPATIENT
Start: 2022-06-06 | End: 2022-12-13

## 2022-06-06 NOTE — ED PROVIDER NOTES
Subjective     Leg Swelling  Location:  Swelling in feet for last 2 weeks  Quality:  Aching  Severity:  Moderate  Onset quality:  Gradual  Duration:  2 weeks  Timing:  Constant  Progression:  Worsening  Chronicity:  Recurrent  Context:  See above  Relieved by:  Nothing  Worsened by:  Nothing  Ineffective treatments:  None tried  Associated symptoms: no abdominal pain, no congestion, no cough, no fever, no headaches, no myalgias, no rhinorrhea, no shortness of breath, no sore throat and no wheezing    Risk factors:  Elderly      Review of Systems   Constitutional: Negative.  Negative for fever.   HENT: Negative.  Negative for congestion, rhinorrhea and sore throat.    Eyes: Negative.    Respiratory: Negative.  Negative for cough, shortness of breath and wheezing.    Cardiovascular: Negative.    Gastrointestinal: Negative.  Negative for abdominal pain.   Endocrine: Negative.    Genitourinary: Negative.    Musculoskeletal: Negative.  Negative for myalgias.   Skin: Negative.    Allergic/Immunologic: Negative.    Neurological: Negative.  Negative for headaches.   Hematological: Negative.    Psychiatric/Behavioral: Negative.        Past Medical History:   Diagnosis Date   • Hip pain, acute, left    • Hypertension        No Known Allergies    No past surgical history on file.    No family history on file.    Social History     Socioeconomic History   • Marital status:    Tobacco Use   • Smoking status: Never Smoker   • Smokeless tobacco: Never Used   Substance and Sexual Activity   • Alcohol use: No   • Drug use: No   • Sexual activity: Defer           Objective   Physical Exam  Vitals and nursing note reviewed.   Constitutional:       Appearance: She is well-developed.   HENT:      Head: Normocephalic.      Right Ear: External ear normal.      Left Ear: External ear normal.   Eyes:      Conjunctiva/sclera: Conjunctivae normal.      Pupils: Pupils are equal, round, and reactive to light.   Cardiovascular:      Rate  and Rhythm: Normal rate and regular rhythm.      Heart sounds: Normal heart sounds.   Pulmonary:      Effort: Pulmonary effort is normal.      Breath sounds: Normal breath sounds.   Abdominal:      General: Bowel sounds are normal.      Palpations: Abdomen is soft.   Musculoskeletal:         General: Normal range of motion.      Cervical back: Normal range of motion and neck supple.      Right lower leg: Edema (feet) present.      Left lower leg: Edema (feet) present.   Skin:     General: Skin is warm and dry.      Capillary Refill: Capillary refill takes less than 2 seconds.   Neurological:      Mental Status: She is alert and oriented to person, place, and time.   Psychiatric:         Behavior: Behavior normal.         Thought Content: Thought content normal.         Procedures           ED Course  ED Course as of 06/17/22 1116   Mon Jun 06, 2022   1210 EKG noted sinus rhythm.  91 bpm.  .  QRS 84.  QTc 455.  No acute ST elevation [SF]      ED Course User Index  [SF] Contreras Morales DO                                                 MDM    Final diagnoses:   Lower extremity edema   Renal mass       ED Disposition  ED Disposition     ED Disposition   Discharge    Condition   Stable    Comment   --             Yuridia Levy, APRN  [address] [facility] [address] [id number]  [phone]    Schedule an appointment as soon as possible for a visit   For further evaluation         Medication List      New Prescriptions    furosemide 20 MG tablet  Commonly known as: LASIX  Take 1 tablet by mouth Daily for 3 days.           Where to Get Your Medications      These medications were sent to [facility] - [place], [place] - [address] - [phone]  - [phone] FX  [address] [address]    Phone: [phone]   · furosemide 20 MG tablet          Lew Patterson, SUNDAY  06/17/22 1116

## 2022-10-04 ENCOUNTER — HOSPITAL ENCOUNTER (EMERGENCY)
Facility: HOSPITAL | Age: 87
Discharge: HOME | End: 2022-10-04
Attending: STUDENT IN AN ORGANIZED HEALTH CARE EDUCATION/TRAINING PROGRAM | Admitting: STUDENT IN AN ORGANIZED HEALTH CARE EDUCATION/TRAINING PROGRAM

## 2022-10-04 ENCOUNTER — APPOINTMENT (OUTPATIENT)
Dept: GENERAL RADIOLOGY | Facility: HOSPITAL | Age: 87
End: 2022-10-04

## 2022-10-04 ENCOUNTER — APPOINTMENT (OUTPATIENT)
Dept: CT IMAGING | Facility: HOSPITAL | Age: 87
End: 2022-10-04

## 2022-10-04 VITALS
TEMPERATURE: 98.2 F | RESPIRATION RATE: 18 BRPM | DIASTOLIC BLOOD PRESSURE: 95 MMHG | BODY MASS INDEX: 15.27 KG/M2 | HEART RATE: 99 BPM | WEIGHT: 83 LBS | OXYGEN SATURATION: 96 % | HEIGHT: 62 IN | SYSTOLIC BLOOD PRESSURE: 153 MMHG

## 2022-10-04 DIAGNOSIS — R10.11 RIGHT UPPER QUADRANT ABDOMINAL PAIN: ICD-10-CM

## 2022-10-04 DIAGNOSIS — R07.89 CHEST WALL PAIN: Primary | ICD-10-CM

## 2022-10-04 LAB
ALBUMIN SERPL-MCNC: 3.53 G/DL (ref 3.5–5.2)
ALBUMIN/GLOB SERPL: 1.1 G/DL
ALP SERPL-CCNC: 78 U/L (ref 39–117)
ALT SERPL W P-5'-P-CCNC: 6 U/L (ref 1–33)
ANION GAP SERPL CALCULATED.3IONS-SCNC: 12.9 MMOL/L (ref 5–15)
AST SERPL-CCNC: 13 U/L (ref 1–32)
BASOPHILS # BLD AUTO: 0.06 10*3/MM3 (ref 0–0.2)
BASOPHILS NFR BLD AUTO: 0.6 % (ref 0–1.5)
BILIRUB SERPL-MCNC: 0.5 MG/DL (ref 0–1.2)
BUN SERPL-MCNC: 36 MG/DL (ref 8–23)
BUN/CREAT SERPL: 27.9 (ref 7–25)
CALCIUM SPEC-SCNC: 10.1 MG/DL (ref 8.2–9.6)
CHLORIDE SERPL-SCNC: 107 MMOL/L (ref 98–107)
CK SERPL-CCNC: 18 U/L (ref 20–180)
CO2 SERPL-SCNC: 21.1 MMOL/L (ref 22–29)
CREAT SERPL-MCNC: 1.29 MG/DL (ref 0.57–1)
CRP SERPL-MCNC: 0.44 MG/DL (ref 0–0.5)
DEPRECATED RDW RBC AUTO: 48.3 FL (ref 37–54)
EGFRCR SERPLBLD CKD-EPI 2021: 37.8 ML/MIN/1.73
EOSINOPHIL # BLD AUTO: 0.47 10*3/MM3 (ref 0–0.4)
EOSINOPHIL NFR BLD AUTO: 5 % (ref 0.3–6.2)
ERYTHROCYTE [DISTWIDTH] IN BLOOD BY AUTOMATED COUNT: 13.9 % (ref 12.3–15.4)
GLOBULIN UR ELPH-MCNC: 3.2 GM/DL
GLUCOSE SERPL-MCNC: 98 MG/DL (ref 65–99)
HCT VFR BLD AUTO: 41.1 % (ref 34–46.6)
HGB BLD-MCNC: 13.3 G/DL (ref 12–15.9)
HOLD SPECIMEN: NORMAL
HOLD SPECIMEN: NORMAL
IMM GRANULOCYTES # BLD AUTO: 0.09 10*3/MM3 (ref 0–0.05)
IMM GRANULOCYTES NFR BLD AUTO: 1 % (ref 0–0.5)
LIPASE SERPL-CCNC: 16 U/L (ref 13–60)
LYMPHOCYTES # BLD AUTO: 1.64 10*3/MM3 (ref 0.7–3.1)
LYMPHOCYTES NFR BLD AUTO: 17.4 % (ref 19.6–45.3)
MAGNESIUM SERPL-MCNC: 2.1 MG/DL (ref 1.7–2.3)
MCH RBC QN AUTO: 30.6 PG (ref 26.6–33)
MCHC RBC AUTO-ENTMCNC: 32.4 G/DL (ref 31.5–35.7)
MCV RBC AUTO: 94.7 FL (ref 79–97)
MONOCYTES # BLD AUTO: 0.75 10*3/MM3 (ref 0.1–0.9)
MONOCYTES NFR BLD AUTO: 8 % (ref 5–12)
NEUTROPHILS NFR BLD AUTO: 6.42 10*3/MM3 (ref 1.7–7)
NEUTROPHILS NFR BLD AUTO: 68 % (ref 42.7–76)
NRBC BLD AUTO-RTO: 0 /100 WBC (ref 0–0.2)
PLATELET # BLD AUTO: 258 10*3/MM3 (ref 140–450)
PMV BLD AUTO: 10.4 FL (ref 6–12)
POTASSIUM SERPL-SCNC: 4 MMOL/L (ref 3.5–5.2)
PROT SERPL-MCNC: 6.7 G/DL (ref 6–8.5)
QT INTERVAL: 354 MS
QTC INTERVAL: 463 MS
RBC # BLD AUTO: 4.34 10*6/MM3 (ref 3.77–5.28)
SODIUM SERPL-SCNC: 141 MMOL/L (ref 136–145)
TROPONIN T SERPL-MCNC: 0.01 NG/ML (ref 0–0.03)
WBC NRBC COR # BLD: 9.43 10*3/MM3 (ref 3.4–10.8)
WHOLE BLOOD HOLD COAG: NORMAL
WHOLE BLOOD HOLD SPECIMEN: NORMAL

## 2022-10-04 PROCEDURE — 96374 THER/PROPH/DIAG INJ IV PUSH: CPT

## 2022-10-04 PROCEDURE — 74176 CT ABD & PELVIS W/O CONTRAST: CPT

## 2022-10-04 PROCEDURE — 83735 ASSAY OF MAGNESIUM: CPT | Performed by: PHYSICIAN ASSISTANT

## 2022-10-04 PROCEDURE — 83690 ASSAY OF LIPASE: CPT | Performed by: PHYSICIAN ASSISTANT

## 2022-10-04 PROCEDURE — 93010 ELECTROCARDIOGRAM REPORT: CPT | Performed by: INTERNAL MEDICINE

## 2022-10-04 PROCEDURE — 86140 C-REACTIVE PROTEIN: CPT | Performed by: PHYSICIAN ASSISTANT

## 2022-10-04 PROCEDURE — 71250 CT THORAX DX C-: CPT | Performed by: RADIOLOGY

## 2022-10-04 PROCEDURE — 71250 CT THORAX DX C-: CPT

## 2022-10-04 PROCEDURE — 74176 CT ABD & PELVIS W/O CONTRAST: CPT | Performed by: RADIOLOGY

## 2022-10-04 PROCEDURE — 25010000002 MORPHINE PER 10 MG: Performed by: STUDENT IN AN ORGANIZED HEALTH CARE EDUCATION/TRAINING PROGRAM

## 2022-10-04 PROCEDURE — 96375 TX/PRO/DX INJ NEW DRUG ADDON: CPT

## 2022-10-04 PROCEDURE — 82550 ASSAY OF CK (CPK): CPT | Performed by: PHYSICIAN ASSISTANT

## 2022-10-04 PROCEDURE — 99284 EMERGENCY DEPT VISIT MOD MDM: CPT

## 2022-10-04 PROCEDURE — 71045 X-RAY EXAM CHEST 1 VIEW: CPT

## 2022-10-04 PROCEDURE — 85025 COMPLETE CBC W/AUTO DIFF WBC: CPT | Performed by: PHYSICIAN ASSISTANT

## 2022-10-04 PROCEDURE — 25010000002 ONDANSETRON PER 1 MG: Performed by: STUDENT IN AN ORGANIZED HEALTH CARE EDUCATION/TRAINING PROGRAM

## 2022-10-04 PROCEDURE — 93005 ELECTROCARDIOGRAM TRACING: CPT | Performed by: PHYSICIAN ASSISTANT

## 2022-10-04 PROCEDURE — 25010000002 MORPHINE PER 10 MG: Performed by: EMERGENCY MEDICINE

## 2022-10-04 PROCEDURE — 36415 COLL VENOUS BLD VENIPUNCTURE: CPT

## 2022-10-04 PROCEDURE — 84484 ASSAY OF TROPONIN QUANT: CPT | Performed by: PHYSICIAN ASSISTANT

## 2022-10-04 PROCEDURE — 96376 TX/PRO/DX INJ SAME DRUG ADON: CPT

## 2022-10-04 PROCEDURE — 80053 COMPREHEN METABOLIC PANEL: CPT | Performed by: PHYSICIAN ASSISTANT

## 2022-10-04 RX ORDER — MORPHINE SULFATE 2 MG/ML
1 INJECTION, SOLUTION INTRAMUSCULAR; INTRAVENOUS ONCE
Status: COMPLETED | OUTPATIENT
Start: 2022-10-04 | End: 2022-10-04

## 2022-10-04 RX ORDER — ONDANSETRON 2 MG/ML
4 INJECTION INTRAMUSCULAR; INTRAVENOUS ONCE
Status: COMPLETED | OUTPATIENT
Start: 2022-10-04 | End: 2022-10-04

## 2022-10-04 RX ORDER — MORPHINE SULFATE 2 MG/ML
1 INJECTION, SOLUTION INTRAMUSCULAR; INTRAVENOUS ONCE
Status: DISCONTINUED | OUTPATIENT
Start: 2022-10-04 | End: 2022-10-04

## 2022-10-04 RX ORDER — SODIUM CHLORIDE 0.9 % (FLUSH) 0.9 %
10 SYRINGE (ML) INJECTION AS NEEDED
Status: DISCONTINUED | OUTPATIENT
Start: 2022-10-04 | End: 2022-10-04 | Stop reason: HOSPADM

## 2022-10-04 RX ORDER — MORPHINE SULFATE 2 MG/ML
2 INJECTION, SOLUTION INTRAMUSCULAR; INTRAVENOUS ONCE
Status: COMPLETED | OUTPATIENT
Start: 2022-10-04 | End: 2022-10-04

## 2022-10-04 RX ADMIN — ONDANSETRON 4 MG: 2 INJECTION INTRAMUSCULAR; INTRAVENOUS at 07:20

## 2022-10-04 RX ADMIN — MORPHINE SULFATE 2 MG: 2 INJECTION, SOLUTION INTRAMUSCULAR; INTRAVENOUS at 07:20

## 2022-10-04 RX ADMIN — MORPHINE SULFATE 1 MG: 2 INJECTION, SOLUTION INTRAMUSCULAR; INTRAVENOUS at 10:05

## 2022-10-04 RX ADMIN — SODIUM CHLORIDE 500 ML: 9 INJECTION, SOLUTION INTRAVENOUS at 07:45

## 2022-10-04 RX ADMIN — SODIUM CHLORIDE 500 ML: 9 INJECTION, SOLUTION INTRAVENOUS at 10:08

## 2022-10-04 NOTE — ED PROVIDER NOTES
Subjective   History of Present Illness  89-year-old female presents to the ER with chief complaint of right-sided chest wall pain/right abdominal pain.  Onset was within the last 24 hours.  Patient verbalizes that she has had some falls but none recent.  Patient denies any nausea or vomiting.        Review of Systems   Constitutional: Negative.  Negative for fever.   HENT: Negative.    Respiratory: Negative.    Cardiovascular: Positive for chest pain.   Gastrointestinal: Positive for abdominal pain.   Endocrine: Negative.    Genitourinary: Negative.  Negative for dysuria.   Skin: Negative.    Neurological: Negative.    Psychiatric/Behavioral: Negative.    All other systems reviewed and are negative.      Past Medical History:   Diagnosis Date   • Hip pain, acute, left    • Hypertension        No Known Allergies    No past surgical history on file.    No family history on file.    Social History     Socioeconomic History   • Marital status:    Tobacco Use   • Smoking status: Never Smoker   • Smokeless tobacco: Never Used   Substance and Sexual Activity   • Alcohol use: No   • Drug use: No   • Sexual activity: Defer           Objective   Physical Exam  Vitals and nursing note reviewed.   Constitutional:       General: She is not in acute distress.     Appearance: She is well-developed. She is not diaphoretic.   HENT:      Head: Normocephalic and atraumatic.      Right Ear: External ear normal.      Left Ear: External ear normal.      Nose: Nose normal.   Eyes:      Conjunctiva/sclera: Conjunctivae normal.      Pupils: Pupils are equal, round, and reactive to light.   Neck:      Vascular: No JVD.      Trachea: No tracheal deviation.   Cardiovascular:      Rate and Rhythm: Normal rate and regular rhythm.      Heart sounds: Normal heart sounds. No murmur heard.  Pulmonary:      Effort: Pulmonary effort is normal. No respiratory distress.      Breath sounds: Normal breath sounds. No wheezing.      Comments: Right  lateral lateral chest wall pain  Chest:      Chest wall: Tenderness present.   Abdominal:      Palpations: Abdomen is soft.      Tenderness: There is abdominal tenderness.      Comments: Patient does appear to be tenderness within the right flank.   Musculoskeletal:         General: No deformity. Normal range of motion.      Cervical back: Normal range of motion and neck supple.   Skin:     General: Skin is warm and dry.      Coloration: Skin is not pale.      Findings: No erythema or rash.   Neurological:      Mental Status: She is alert and oriented to person, place, and time.      Cranial Nerves: No cranial nerve deficit.   Psychiatric:         Behavior: Behavior normal.         Thought Content: Thought content normal.         Procedures           ED Course  ED Course as of 10/04/22 1051   Tue Oct 04, 2022   0631 EKG noted sinus tachycardia.  103 bpm.  .  QTc 463.  No acute ST elevation.  ST depression in the anterior leads. [SF]   0654 CXR rad interpreted:  . Chronic emphysematous changes. No displaced rib fracture. [RB]   0747 Patient was sent to  in April of this year secondary to choledocholithiasis.  Patient had a biliary stent placed the stent was removed about a month ago. [RB]   0748 Patient is a DNR and does not wish to have any type of life-saving measures.  This was confirmed with the patient's daughter. [RB]   0927 CT Chest Without Contrast Diagnostic  FINDINGS:    LUNGS:  Scattered subpleural nodularity and fibrotic change are again  noted.    PLEURAL SPACE:  Unremarkable.  No pneumothorax.  No significant  effusion.    HEART:  Unremarkable.  No cardiomegaly.  No significant pericardial  effusion.  No significant coronary artery calcifications.    BONES/JOINTS:  Unremarkable.  No acute fracture.  No dislocation.    SOFT TISSUES:  Unremarkable.    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   Atherosclerotic disease.  No thoracic aortic aneurysm.    LYMPH NODES:   Unremarkable.  No enlarged lymph nodes.     IMPRESSION:    Again ectasia of the ascending aorta measuring 4.1 cm. [AH]   0928 CT Abdomen Pelvis Without Contrast  FINDINGS:    LUNG BASES:  Unremarkable.  No mass.  No consolidation.      ABDOMEN:    LIVER:  Unremarkable.    GALLBLADDER AND BILE DUCTS:  Pneumobilia of the liver again noted.  No  calcified stones.  No ductal dilation.    PANCREAS:  Unremarkable.  No ductal dilation.    SPLEEN:  Unremarkable.  No splenomegaly.    ADRENALS:  Unremarkable.  No mass.    KIDNEYS AND URETERS:  Again mixed density exophytic left renal lesion  measuring 3.4 cm is noted.  No obstructing stones.  No hydronephrosis.    STOMACH AND BOWEL:  Unremarkable.  No obstruction.  No mucosal  thickening.      PELVIS:    APPENDIX:  No findings to suggest acute appendicitis.    BLADDER:  Unremarkable.  No stones.    REPRODUCTIVE:  Unremarkable as visualized.      ABDOMEN and PELVIS:    INTRAPERITONEAL SPACE:  Unremarkable.  No free air.  No significant  fluid collection.    BONES/JOINTS:  No acute fracture.  No dislocation.    SOFT TISSUES:  Unremarkable.    VASCULATURE:  Atherosclerotic disease.  No abdominal aortic aneurysm.    LYMPH NODES:  Unremarkable.  No enlarged lymph nodes.     IMPRESSION:    No acute findings in the abdomen or pelvis. [AH]   1050 No acute findings on the patient's ED work-up.  No evidence of a biliary obstruction.  Her liver enzymes and bilirubin are normal.  Currently no pain on palpation to her chest wall or abdomen.  The patient tells me that she wants to go home.  Family is agreeable for discharge at this time as well.  She will follow-up outpatient will return to the ED if her symptoms change or worsen. [AH]      ED Course User Index  [AH] Taylor Meza PA  [RB] Félix Patterson II PA  [SF] Contreras Morales DO                                           MDM  Number of Diagnoses or Management Options     Amount and/or Complexity of Data Reviewed  Clinical lab tests:  reviewed  Tests in the radiology section of CPT®: reviewed  Tests in the medicine section of CPT®: reviewed  Decide to obtain previous medical records or to obtain history from someone other than the patient: yes    Patient Progress  Patient progress: improved      Final diagnoses:   Chest wall pain   Right upper quadrant abdominal pain       ED Disposition  ED Disposition     ED Disposition   Discharge    Condition   Stable    Comment   --             Yuridia Levy, APRN  [address] [facility] [address] [facility] [address]  [phone]    Schedule an appointment as soon as possible for a visit in 1 day           Medication List      No changes were made to your prescriptions during this visit.          Taylor Meza, PA  10/04/22 1051

## 2022-11-16 ENCOUNTER — APPOINTMENT (OUTPATIENT)
Dept: CT IMAGING | Facility: HOSPITAL | Age: 87
End: 2022-11-16

## 2022-11-16 ENCOUNTER — HOSPITAL ENCOUNTER (EMERGENCY)
Facility: HOSPITAL | Age: 87
Discharge: HOME | End: 2022-11-17
Attending: STUDENT IN AN ORGANIZED HEALTH CARE EDUCATION/TRAINING PROGRAM | Admitting: STUDENT IN AN ORGANIZED HEALTH CARE EDUCATION/TRAINING PROGRAM

## 2022-11-16 DIAGNOSIS — R62.7 FAILURE TO THRIVE IN ADULT: ICD-10-CM

## 2022-11-16 DIAGNOSIS — R53.81 PHYSICAL DECONDITIONING: Primary | ICD-10-CM

## 2022-11-16 LAB
ALBUMIN SERPL-MCNC: 2.52 G/DL (ref 3.5–5.2)
ALBUMIN/GLOB SERPL: 0.7 G/DL
ALP SERPL-CCNC: 106 U/L (ref 39–117)
ALT SERPL W P-5'-P-CCNC: <5 U/L (ref 1–33)
ANION GAP SERPL CALCULATED.3IONS-SCNC: 13.2 MMOL/L (ref 5–15)
AST SERPL-CCNC: 8 U/L (ref 1–32)
BASOPHILS # BLD AUTO: 0.03 10*3/MM3 (ref 0–0.2)
BASOPHILS NFR BLD AUTO: 0.3 % (ref 0–1.5)
BILIRUB SERPL-MCNC: 0.5 MG/DL (ref 0–1.2)
BUN SERPL-MCNC: 19 MG/DL (ref 8–23)
BUN/CREAT SERPL: 27.9 (ref 7–25)
CALCIUM SPEC-SCNC: 9.4 MG/DL (ref 8.2–9.6)
CHLORIDE SERPL-SCNC: 103 MMOL/L (ref 98–107)
CO2 SERPL-SCNC: 20.8 MMOL/L (ref 22–29)
CREAT SERPL-MCNC: 0.68 MG/DL (ref 0.57–1)
D-LACTATE SERPL-SCNC: 1.5 MMOL/L (ref 0.5–2)
DEPRECATED RDW RBC AUTO: 52.7 FL (ref 37–54)
EGFRCR SERPLBLD CKD-EPI 2021: 79.3 ML/MIN/1.73
EOSINOPHIL # BLD AUTO: 0.15 10*3/MM3 (ref 0–0.4)
EOSINOPHIL NFR BLD AUTO: 1.7 % (ref 0.3–6.2)
ERYTHROCYTE [DISTWIDTH] IN BLOOD BY AUTOMATED COUNT: 15.3 % (ref 12.3–15.4)
GLOBULIN UR ELPH-MCNC: 3.5 GM/DL
GLUCOSE SERPL-MCNC: 92 MG/DL (ref 65–99)
HCT VFR BLD AUTO: 37.4 % (ref 34–46.6)
HGB BLD-MCNC: 12.2 G/DL (ref 12–15.9)
IMM GRANULOCYTES # BLD AUTO: 0.05 10*3/MM3 (ref 0–0.05)
IMM GRANULOCYTES NFR BLD AUTO: 0.6 % (ref 0–0.5)
LIPASE SERPL-CCNC: 13 U/L (ref 13–60)
LYMPHOCYTES # BLD AUTO: 0.85 10*3/MM3 (ref 0.7–3.1)
LYMPHOCYTES NFR BLD AUTO: 9.9 % (ref 19.6–45.3)
MCH RBC QN AUTO: 30.3 PG (ref 26.6–33)
MCHC RBC AUTO-ENTMCNC: 32.6 G/DL (ref 31.5–35.7)
MCV RBC AUTO: 92.8 FL (ref 79–97)
MONOCYTES # BLD AUTO: 0.51 10*3/MM3 (ref 0.1–0.9)
MONOCYTES NFR BLD AUTO: 5.9 % (ref 5–12)
NEUTROPHILS NFR BLD AUTO: 7.01 10*3/MM3 (ref 1.7–7)
NEUTROPHILS NFR BLD AUTO: 81.6 % (ref 42.7–76)
NRBC BLD AUTO-RTO: 0 /100 WBC (ref 0–0.2)
PLATELET # BLD AUTO: 272 10*3/MM3 (ref 140–450)
PMV BLD AUTO: 9.4 FL (ref 6–12)
POTASSIUM SERPL-SCNC: 4.1 MMOL/L (ref 3.5–5.2)
PROT SERPL-MCNC: 6 G/DL (ref 6–8.5)
RBC # BLD AUTO: 4.03 10*6/MM3 (ref 3.77–5.28)
SODIUM SERPL-SCNC: 137 MMOL/L (ref 136–145)
WBC NRBC COR # BLD: 8.6 10*3/MM3 (ref 3.4–10.8)

## 2022-11-16 PROCEDURE — 93010 ELECTROCARDIOGRAM REPORT: CPT | Performed by: INTERNAL MEDICINE

## 2022-11-16 PROCEDURE — 93005 ELECTROCARDIOGRAM TRACING: CPT | Performed by: STUDENT IN AN ORGANIZED HEALTH CARE EDUCATION/TRAINING PROGRAM

## 2022-11-16 PROCEDURE — 83605 ASSAY OF LACTIC ACID: CPT | Performed by: STUDENT IN AN ORGANIZED HEALTH CARE EDUCATION/TRAINING PROGRAM

## 2022-11-16 PROCEDURE — 74177 CT ABD & PELVIS W/CONTRAST: CPT

## 2022-11-16 PROCEDURE — 96365 THER/PROPH/DIAG IV INF INIT: CPT

## 2022-11-16 PROCEDURE — 83690 ASSAY OF LIPASE: CPT | Performed by: STUDENT IN AN ORGANIZED HEALTH CARE EDUCATION/TRAINING PROGRAM

## 2022-11-16 PROCEDURE — 63710000001 ONDANSETRON ODT 4 MG TABLET DISPERSIBLE: Performed by: STUDENT IN AN ORGANIZED HEALTH CARE EDUCATION/TRAINING PROGRAM

## 2022-11-16 PROCEDURE — 36415 COLL VENOUS BLD VENIPUNCTURE: CPT

## 2022-11-16 PROCEDURE — 85025 COMPLETE CBC W/AUTO DIFF WBC: CPT | Performed by: STUDENT IN AN ORGANIZED HEALTH CARE EDUCATION/TRAINING PROGRAM

## 2022-11-16 PROCEDURE — 80053 COMPREHEN METABOLIC PANEL: CPT | Performed by: STUDENT IN AN ORGANIZED HEALTH CARE EDUCATION/TRAINING PROGRAM

## 2022-11-16 PROCEDURE — 99284 EMERGENCY DEPT VISIT MOD MDM: CPT

## 2022-11-16 RX ORDER — ONDANSETRON 4 MG/1
4 TABLET, ORALLY DISINTEGRATING ORAL ONCE
Status: COMPLETED | OUTPATIENT
Start: 2022-11-16 | End: 2022-11-16

## 2022-11-16 RX ORDER — ACETAMINOPHEN 500 MG
500 TABLET ORAL ONCE
Status: COMPLETED | OUTPATIENT
Start: 2022-11-16 | End: 2022-11-16

## 2022-11-16 RX ADMIN — ACETAMINOPHEN 500 MG: 500 TABLET ORAL at 20:30

## 2022-11-16 RX ADMIN — ONDANSETRON 4 MG: 4 TABLET, ORALLY DISINTEGRATING ORAL at 20:30

## 2022-11-17 VITALS
RESPIRATION RATE: 20 BRPM | WEIGHT: 80 LBS | SYSTOLIC BLOOD PRESSURE: 111 MMHG | BODY MASS INDEX: 14.72 KG/M2 | OXYGEN SATURATION: 98 % | HEIGHT: 62 IN | DIASTOLIC BLOOD PRESSURE: 77 MMHG | TEMPERATURE: 97.6 F | HEART RATE: 89 BPM

## 2022-11-17 PROCEDURE — 25010000002 CEFTRIAXONE PER 250 MG: Performed by: STUDENT IN AN ORGANIZED HEALTH CARE EDUCATION/TRAINING PROGRAM

## 2022-11-17 PROCEDURE — 25010000002 IOPAMIDOL 61 % SOLUTION: Performed by: STUDENT IN AN ORGANIZED HEALTH CARE EDUCATION/TRAINING PROGRAM

## 2022-11-17 PROCEDURE — 96365 THER/PROPH/DIAG IV INF INIT: CPT

## 2022-11-17 RX ORDER — CEFDINIR 300 MG/1
300 CAPSULE ORAL DAILY
Qty: 9 CAPSULE | Refills: 0 | Status: SHIPPED | OUTPATIENT
Start: 2022-11-17 | End: 2022-11-26

## 2022-11-17 RX ADMIN — CEFTRIAXONE 1 G: 1 INJECTION, POWDER, FOR SOLUTION INTRAMUSCULAR; INTRAVENOUS at 05:20

## 2022-11-17 RX ADMIN — SODIUM CHLORIDE 1000 ML: 9 INJECTION, SOLUTION INTRAVENOUS at 00:53

## 2022-11-17 RX ADMIN — IOPAMIDOL 50 ML: 612 INJECTION, SOLUTION INTRAVENOUS at 00:08

## 2022-11-17 RX ADMIN — SODIUM CHLORIDE 1000 ML: 9 INJECTION, SOLUTION INTRAVENOUS at 05:20

## 2022-11-17 NOTE — ED NOTES
Called house Supervisor and spoke to Félix to get patient transported back home. They will be here as soon as possible.

## 2022-11-17 NOTE — CASE MANAGEMENT/SOCIAL WORK
Case Management/Social Work    Patient Name:  Ashlee Go  YOB: 1925  MRN: [id number]  Admit Date:  11/16/2022    SS received physician consult for needs home health set up for physical therapy, ambulatory referral order already placed. SS contacted Pt's daughter, Rhina [phone] who confirms Pt will be returning home at [address] [facility]. Pt's family are agreeable to home health and do not have a preference of home health agency. Daughter requested an EMS to transport Pt home.  SS faxed referral to [facility] to fax [phone]. Once [facility] has verified if they can accept Pt, SS will provide RN with report. SS notified RN.     09:26am: SS attempted to speak with Pt at bedside, but Pt was sleeping. Pt's daughter, Daniela at bedside is agreeable to home with home health services for PT/OT. Per Daniela, Pt's son stays at night and daughters take shifts staying with Pt during the day. SS spoke with [facility] per Nancy who states Pt has been accepted. SS provided RN report number for [facility] [phone].     14:27pm: SS spoke with [facility] per Nancy who states they do not have a diagnosis to see Pt at home. SS contacted PCP office, Sharon Navarro per Lesley who states she will fax a  referral to [facility] fax [phone].           Electronically signed by:  SHAJI Garcia  11/17/22 08:59 EST

## 2022-11-17 NOTE — DISCHARGE PLACEMENT REQUEST
"Ashlee Go (89 y.o. Female)     Date of Birth   07/27/1925    Social Security Number       Address   [address] [place] [address]    Home Phone   [phone]    MRN   [id number]       Restoration   None    Marital Status                               Admission Date   11/16/22    Admission Type   Emergency    Admitting Provider       Attending Provider   Lawrence Gordon MD    Department, Room/Bed   [facility] Emergency Department, 113/13       Discharge Date       Discharge Disposition       Discharge Destination                               Attending Provider: Lawrence Gordon MD    Allergies: No Known Allergies    Isolation: None   Infection: None   Code Status: Prior    Ht: 157.5 cm (62\")   Wt: 36.3 kg (80 lb)    Admission Cmt: None   Principal Problem: None                Active Insurance as of 11/16/2022     Primary Coverage     Payor Plan Insurance Group Employer/Plan Group    MEDICARE MEDICARE A & B      Payor Plan Address Payor Plan Phone Number Payor Plan Fax Number Effective Dates    [address] [phone]  7/1/1990 - None Entered    [place] [id number]       Subscriber Name Subscriber Birth Date Member ID       ASHLEE GO 7/27/1925 [id number]           Secondary Coverage     Payor Plan Insurance Group Employer/Plan Group    [facility] [id number]     Payor Plan Address Payor Plan Phone Number Payor Plan Fax Number Effective Dates    [address] [address] [phone]  1/1/2010 - None Entered    [facility] [id number]       Subscriber Name Subscriber Birth Date Member ID       ASHLEE GO 7/27/1925 [id number]                 Emergency Contacts      (Rel.) Home Phone Work Phone Mobile Phone    RIANOSVALDO BECERRIL (Daughter) [phone] -- --    Bella John (Daughter) [phone] -- --    BREONNATRACI (Daughter) -- -- [phone]        [facility] Emergency Department  [address] [facility] [address]  Phone:  [phone]  Fax:   Date: Nov 17, "       Ambulatory Referral to Home Health ([facility])     Patient:  Ashlee Go MRN:  [id number]   [address] [place] [address] :  1925  SSN:    Phone: [phone] Sex:  F      INSURANCE PAYOR PLAN GROUP # SUBSCRIBER ID   Primary:  Secondary:    MEDICARE  UMR [id number]  [id number]    [id number]  [id number]      Referring Provider Information:  KLAUDIA JOHNSON Phone: [phone] Fax: [phone]       Referral Information:   # Visits:  999 Referral Type: Home Health [42]   Urgency:  Routine Referral Reason: Specialty Services Required   Start Date: 2022 End Date:  To be determined by Insurer   Diagnosis: Physical deconditioning (R53.81 [ICD-10-CM] 799.3 [ICD-9-CM])      Refer to Dept:   Refer to Provider:   Refer to Provider Phone:   Refer to Facility:       Face to Face Visit Date: 2022  Follow-up provider for Plan of Care? I treated the patient in an acute care facility and will not continue treatment after discharge.  Follow-up provider: KLAUDIA JOHNSON [[id number]] (Yuridia Cam)  Reason/Clinical Findings: physical deconditoning  Describe mobility limitations that make leaving home difficult: limited mobility due to generalized weakness  Nursing/Therapeutic Services Requested: Physical Therapy  Nursing/Therapeutic Services Requested: Occupational Therapy  Occupational orders: Activities of daily living  Frequency: 1 Week 1     This document serves as a request of services and does not constitute Insurance authorization or approval of services.  To determine eligibility, please contact the members Insurance carrier to verify and review coverage.     If you have medical questions regarding this request for services. Please contact [facility] Emergency Department at [phone] during normal business hours.        Authorizing Provider:Klaudia Johnson MD  Authorizing Provider's NPI: [id number]  Order Entered By: Klaudia Johnson MD  11/17/2022  5:10 AM     Electronically signed by: Klaudia Johnson MD 11/17/2022  5:10 AM         Vital Signs (last day)     Date/Time Temp Temp src Pulse Resp BP Patient Position SpO2    11/17/22 0846 -- -- -- -- 105/72 -- --    11/17/22 0831 -- -- -- -- 115/73 -- --    11/17/22 0816 -- -- 89 -- 121/76 -- 97    11/17/22 0801 -- -- 96 -- 122/88 -- 94    11/17/22 0746 -- -- 97 -- 133/90 -- 96    11/17/22 0731 -- -- -- -- 110/74 -- 96    11/17/22 0716 -- -- 87 -- 104/67 -- 96    11/17/22 0701 -- -- 85 -- 109/63 -- 96    11/17/22 0646 -- -- -- -- 112/78 -- 96    11/17/22 0631 -- -- -- -- 123/73 -- 97    11/17/22 0616 -- -- -- -- 122/61 -- 96    11/17/22 0601 -- -- -- -- 111/71 -- 97    11/17/22 0546 -- -- -- -- 137/98 -- 98    11/17/22 0531 -- -- -- -- 136/82 -- 97    11/17/22 0516 -- -- -- -- 118/80 -- 97    11/17/22 0501 -- -- -- -- 132/89 -- 98    11/17/22 0446 -- -- -- -- 127/80 -- 97    11/17/22 0431 -- -- -- -- 112/79 -- 96    11/17/22 0416 -- -- -- -- 115/76 -- 97    11/17/22 0401 -- -- -- -- 108/73 -- 96    11/17/22 0346 -- -- -- -- 113/74 -- 97    11/17/22 0331 -- -- -- -- 134/83 -- 96    11/17/22 0316 -- -- -- -- 126/81 -- 96    11/17/22 0216 -- -- -- -- 132/85 -- 97    11/17/22 0201 -- -- -- -- 130/86 -- 97    11/17/22 0146 -- -- -- -- 146/105 -- 98    11/17/22 0131 -- -- -- -- 118/71 -- 94    11/17/22 0116 -- -- -- -- 115/79 -- 97    11/17/22 0101 -- -- -- -- 99/82 -- 96    11/17/22 0100 97.6 (36.4) Oral 92 20 115/79 -- 100    11/17/22 0046 -- -- -- -- 105/72 -- 97    11/17/22 0043 -- -- -- -- 99/70 -- 96    11/16/22 1920 97.1 (36.2) Oral 116 20 141/112 Lying 94          Lines, Drains & Airways     Active LDAs     Name Placement date Placement time Site Days    Peripheral IV 11/16/22 2340 Right Antecubital 11/16/22 2340  Antecubital  less than 1                  No current facility-administered medications for this encounter.     Current Outpatient Medications   Medication Sig Dispense Refill   •  carvedilol (Coreg) 3.125 MG tablet Take 1 tablet by mouth 2 (Two) Times a Day With Meals. Do not take if blood pressure less than 110/60 and/or heart rate less than 60 60 tablet 0   • docusate sodium (Colace) 100 MG capsule Take 1 capsule by mouth 2 (Two) Times a Day. 60 capsule 0   • furosemide (LASIX) 20 MG tablet Take 1 tablet by mouth Daily for 3 days. 3 tablet 0   • lisinopril-hydrochlorothiazide (PRINZIDE,ZESTORETIC) 20-12.5 MG per tablet Take 1 tablet by mouth Daily.     • megestrol acetate (MEGACE) 400 MG/10ML suspension oral suspension Take 20 mL by mouth Daily. 480 mL 0   • pantoprazole (PROTONIX) 40 MG EC tablet Take 1 tablet by mouth Daily. 30 tablet 0     Lab Results (most recent)     Procedure Component Value Units Date/Time    Comprehensive Metabolic Panel [[id number]]  (Abnormal) Collected: 11/16/22 2008    Specimen: Blood Updated: 11/16/22 2035     Glucose 92 mg/dL      BUN 19 mg/dL      Creatinine 0.68 mg/dL      Sodium 137 mmol/L      Potassium 4.1 mmol/L      Chloride 103 mmol/L      CO2 20.8 mmol/L      Calcium 9.4 mg/dL      Total Protein 6.0 g/dL      Albumin 2.52 g/dL      ALT (SGPT) <5 U/L      AST (SGOT) 8 U/L      Alkaline Phosphatase 106 U/L      Total Bilirubin 0.5 mg/dL      Globulin 3.5 gm/dL      A/G Ratio 0.7 g/dL      BUN/Creatinine Ratio 27.9     Anion Gap 13.2 mmol/L      eGFR 79.3 mL/min/1.73      Comment: National Kidney Foundation and American Society of Nephrology (ASN) Task Force recommended calculation based on the Chronic Kidney Disease Epidemiology Collaboration (CKD-EPI) equation refit without adjustment for race.       Narrative:      GFR Normal >60  Chronic Kidney Disease <60  Kidney Failure <15    The GFR formula is only valid for adults with stable renal function between ages 18 and 70.    Lipase [[id number]]  (Normal) Collected: 11/16/22 2008    Specimen: Blood Updated: 11/16/22 2035     Lipase 13 U/L     Lactic Acid, Plasma [[id number]]  (Normal) Collected: 11/16/22  2008    Specimen: Blood Updated: 11/16/22 2032     Lactate 1.5 mmol/L     CBC & Differential [[id number]]  (Abnormal) Collected: 11/16/22 2008    Specimen: Blood Updated: 11/16/22 2013    Narrative:      The following orders were created for panel order CBC & Differential.  Procedure                               Abnormality         Status                     ---------                               -----------         ------                     CBC Auto Differential[[id number]]        Abnormal            Final result                 Please view results for these tests on the individual orders.    CBC Auto Differential [[id number]]  (Abnormal) Collected: 11/16/22 2008    Specimen: Blood Updated: 11/16/22 2013     WBC 8.60 10*3/mm3      RBC 4.03 10*6/mm3      Hemoglobin 12.2 g/dL      Hematocrit 37.4 %      MCV 92.8 fL      MCH 30.3 pg      MCHC 32.6 g/dL      RDW 15.3 %      RDW-SD 52.7 fl      MPV 9.4 fL      Platelets 272 10*3/mm3      Neutrophil % 81.6 %      Lymphocyte % 9.9 %      Monocyte % 5.9 %      Eosinophil % 1.7 %      Basophil % 0.3 %      Immature Grans % 0.6 %      Neutrophils, Absolute 7.01 10*3/mm3      Lymphocytes, Absolute 0.85 10*3/mm3      Monocytes, Absolute 0.51 10*3/mm3      Eosinophils, Absolute 0.15 10*3/mm3      Basophils, Absolute 0.03 10*3/mm3      Immature Grans, Absolute 0.05 10*3/mm3      nRBC 0.0 /100 WBC           Imaging Results (Most Recent)     Procedure Component Value Units Date/Time    CT Abdomen Pelvis With Contrast [[id number]] Collected: 11/17/22 0023     Updated: 11/17/22 0025    Narrative:      CT Abdomen Pelvis W    INDICATION:   Abdominal and back pain.    TECHNIQUE:   CT of the abdomen and pelvis with IV contrast. Coronal and sagittal reconstructions were obtained.  Radiation dose reduction techniques included automated exposure control or exposure modulation based on body size. Count of known CT and cardiac nuc med  studies performed in previous 12 months: 5.      COMPARISON:   10/4/2022    FINDINGS:  There are new small bilateral pleural effusions with adjacent atelectasis in the lower lobes, right greater than left. There is generalized anasarca. There is diffuse atherosclerotic disease with no aortic aneurysm. Pneumobilia is noted suggesting prior  sphincterotomy. This is unchanged. There is also some air in the gallbladder lumen. The gallbladder is otherwise unremarkable. No evidence of biliary obstruction. Again seen is an exophytic mixed density mass arising from the upper pole of the left  kidney, most consistent with renal cell carcinoma. This measures 3.5 x 2.9 cm and is not significantly changed from the recent prior study. There is some atrophy of the pancreas. Solid abdominal organs are otherwise grossly normal. The kidneys are  nonobstructed. Urinary bladder appears normal. Solid pelvic organs appear age-appropriate.    There is a small amount of ascites. The appendix is not definitely identified. There is no clear evidence of acute colitis. Stomach is grossly normal. Prominent fluid and gas-filled small bowel loops suggest a generalized ileus rather than a bowel  obstruction. No fractures are identified. No clearly suspicious osseous lesions.      Impression:        1. Small bilateral pleural effusions with anasarca and a small amount of ascites.  2. No significant change in a left upper pole renal cell carcinoma. The kidneys are nonobstructed. No clear evidence of metastatic disease.  3. Small bowel ileus. No evidence of colitis. The appendix is not clearly identified.  4. Persistent pneumobilia suggesting prior sphincterotomy.  5. No acute fractures.          Signer Name: Jesse Shukla MD   Signed: 11/17/2022 12:23 AM   Workstation Name: [email or web address]    [facility]

## 2022-11-17 NOTE — ED PROVIDER NOTES
Subjective   History of Present Illness     Aslhee is a 89-year-old female presenting to the emergency department for right-sided abdominal pain. History provided by patient's daughter at bedside. Patient currently lives with her children. Patient complains of constant, moderate abdominal pain which started approximately if 2 to 3 days prior. Patient is still eating but drinking liquids less than usual over the last few months. Patients daughter also reports she has had continued physical decline chronically over the last few months. Patient has been bed bound since May.    Patient denies dysuria, hematuria. No bowel changes. No vomiting. No fevers or other infectious like symptoms. Patient has no prior history of surgery. No recent trauma to abdomen.     Review of Systems   Constitutional: Negative.  Negative for fever.   HENT: Negative.    Respiratory: Negative.    Cardiovascular: Negative.  Negative for chest pain.   Gastrointestinal: Positive for abdominal pain.   Endocrine: Negative.    Genitourinary: Negative.  Negative for dysuria.   Skin: Negative.    Neurological: Negative.    Psychiatric/Behavioral: Negative.    All other systems reviewed and are negative.      Past Medical History:   Diagnosis Date   • Hip pain, acute, left    • Hypertension        No Known Allergies    No past surgical history on file.    No family history on file.    Social History     Socioeconomic History   • Marital status:    Tobacco Use   • Smoking status: Never   • Smokeless tobacco: Never   Substance and Sexual Activity   • Alcohol use: No   • Drug use: No   • Sexual activity: Defer           Objective   Physical Exam  Vitals and nursing note reviewed.   Constitutional:       General: She is not in acute distress.     Comments: Cachectic appearing, chronically    HENT:      Head: Normocephalic and atraumatic.      Right Ear: Tympanic membrane normal.      Left Ear: Tympanic membrane normal.      Nose: Nose normal. No  "congestion.   Cardiovascular:      Rate and Rhythm: Normal rate and regular rhythm.      Pulses: Normal pulses.   Pulmonary:      Effort: Pulmonary effort is normal.   Abdominal:      General: Abdomen is flat. Bowel sounds are normal. There is no distension.      Tenderness: There is abdominal tenderness.   Musculoskeletal:         General: No swelling or tenderness. Normal range of motion.   Skin:     General: Skin is warm.      Capillary Refill: Capillary refill takes less than 2 seconds.      Coloration: Skin is not jaundiced or pale.   Neurological:      General: No focal deficit present.      Mental Status: She is alert and oriented to person, place, and time.         Procedures           ED Course  ED Course as of 11/17/22 1939   Thu Nov 17, 2022   0513 Patient presents with chronic generalized weakness, no focal deficits on exam and 2-3 d of abdominal pain. Patients daughter at bedside. Had extensive conversation with patients daughter who reports that the patient has been bed bound since May 2022. She reports that she attempts to do physical therapy for her mother however her other siblings let her \"lay in the bed all day\". Patient's daughter reports she would like physical therapy for her mother. As patient has heady deconditioning since May and is bed bound. Infectious workup thus far unremarkable, however unable to obtain a urine due to vaginal swelling and irritation. Will treat w/ rocephin 1g IV empirically. Patient currently has unremarkable workup and is cachectic however this is unchanged from baseline. Family interested in physical therapy and occupational therapy via home health to assist with chronic deconditioning.  [LS]   7903 Patient care handed off to oncoming physician pending SW consult. SW consult placed and ambulatory home health order placed. Will assist with orchestrating home health visit prior to discharge given chronic failure to thrive and physical deconditioning.  [LS]   4040 Social " services has evaluated the patient and is making arrangements for further home health care.  Patient is discharged home in care of family  Electronically signed by Lawrence Gordon MD, 11/17/22, 9:22 AM EST.   [CM]      ED Course User Index  [CM] Lawrence Gordon MD  [LS] Klaudia Johnson MD                                           MDM  Number of Diagnoses or Management Options  Failure to thrive in adult  Physical deconditioning  Diagnosis management comments: Labels 89-year-old female past medical history for Gerd presented to the emergency department for right-sided abdominal pain. Patient is afebrile hemodynamically stable on arrival. On physical exam patient has moderate tenderness to the right side of her abdomen. None. Kinetic no rebound or guarding. Patient is frail at baseline but otherwise benign exam. Differentials to conserve not wanting to include; UTI/pyelonephritis, [place] daises, colitis/neuritis, malignancy, low suspicion for bowel obstruction given normal bowel movements, and biliary disease. CT abdomen and pelvis with contrast obtained for further evaluation along with basic labs, urine analysis, lipase results are non actionable. Of Note unable to obtain urinalysis via cath, therefore rocephin will be given empirically. Patient has chronic physical decondition and failure to thrive. Symptoms have been ongoing since May. Patient is provided referral to home health and patient is discharged in stable condition.        Amount and/or Complexity of Data Reviewed  Clinical lab tests: ordered and reviewed  Tests in the radiology section of CPT®: ordered and reviewed  Tests in the medicine section of CPT®: ordered and reviewed  Review and summarize past medical records: yes  Independent visualization of images, tracings, or specimens: yes        Final diagnoses:   Physical deconditioning   Failure to thrive in adult       ED Disposition  ED Disposition     ED Disposition   Discharge     Condition   Stable    Comment   --             [facility]  [address] [facility] [address] [phone]  Schedule an appointment as soon as possible for a visit       Sharon Navarro APRN  [address] [facility] [address] [address]  [phone]    Go in 2 days      [facility] Emergency Department  [address] [facility] [address] [phone]  Go to   If symptoms worsen         Medication List      New Prescriptions    cefdinir 300 MG capsule  Commonly known as: OMNICEF  Take 1 capsule by mouth Daily for 9 days. Starting tomorrow, Friday, November 18, 2022           Where to Get Your Medications      These medications were sent to [facility] - [place], [place] - [address] [facility] [address] [address] - [phone]  - [phone] [facility] [address] [facility] [address]    Phone: [phone]   · cefdinir 300 MG capsule          Klaudia Johnson MD  11/17/22 0519       Klaudia Johnson MD  11/17/22 0539       Klaudia Johnson MD  11/17/22 0601       Klaudia Johnson MD  11/17/22 0634       Klaudia Johnson MD  11/17/22 0647       Klaudia Johnson MD  11/17/22 0700       Klaudia Johnson MD  11/17/22 1933

## 2022-11-18 LAB
QT INTERVAL: 354 MS
QTC INTERVAL: 485 MS

## 2022-12-13 ENCOUNTER — APPOINTMENT (OUTPATIENT)
Dept: ULTRASOUND IMAGING | Facility: HOSPITAL | Age: 87
DRG: 299 | End: 2022-12-13
Payer: MEDICARE

## 2022-12-13 ENCOUNTER — APPOINTMENT (OUTPATIENT)
Dept: CT IMAGING | Facility: HOSPITAL | Age: 87
DRG: 299 | End: 2022-12-13
Payer: MEDICARE

## 2022-12-13 ENCOUNTER — APPOINTMENT (OUTPATIENT)
Dept: GENERAL RADIOLOGY | Facility: HOSPITAL | Age: 87
DRG: 299 | End: 2022-12-13
Payer: MEDICARE

## 2022-12-13 ENCOUNTER — HOSPITAL ENCOUNTER (INPATIENT)
Facility: HOSPITAL | Age: 87
LOS: 1 days | Discharge: HOME HEALTH | DRG: 299 | End: 2022-12-14
Attending: EMERGENCY MEDICINE | Admitting: INTERNAL MEDICINE
Payer: MEDICARE

## 2022-12-13 DIAGNOSIS — I26.99 OTHER PULMONARY EMBOLISM WITHOUT ACUTE COR PULMONALE, UNSPECIFIED CHRONICITY: ICD-10-CM

## 2022-12-13 DIAGNOSIS — I82.412 ACUTE DEEP VEIN THROMBOSIS (DVT) OF FEMORAL VEIN OF LEFT LOWER EXTREMITY: Primary | ICD-10-CM

## 2022-12-13 DIAGNOSIS — R53.81 DEBILITY: ICD-10-CM

## 2022-12-13 LAB
ALBUMIN SERPL-MCNC: 3.06 G/DL (ref 3.5–5.2)
ALBUMIN/GLOB SERPL: 0.9 G/DL
ALP SERPL-CCNC: 84 U/L (ref 39–117)
ALT SERPL W P-5'-P-CCNC: 5 U/L (ref 1–33)
ANION GAP SERPL CALCULATED.3IONS-SCNC: 18.7 MMOL/L (ref 5–15)
APTT PPP: 20 SECONDS (ref 26.5–34.5)
AST SERPL-CCNC: 10 U/L (ref 1–32)
BASOPHILS # BLD AUTO: 0.03 10*3/MM3 (ref 0–0.2)
BASOPHILS NFR BLD AUTO: 0.4 % (ref 0–1.5)
BILIRUB SERPL-MCNC: 0.3 MG/DL (ref 0–1.2)
BUN SERPL-MCNC: 37 MG/DL (ref 8–23)
BUN/CREAT SERPL: 37.8 (ref 7–25)
CALCIUM SPEC-SCNC: 10.1 MG/DL (ref 8.2–9.6)
CHLORIDE SERPL-SCNC: 104 MMOL/L (ref 98–107)
CO2 SERPL-SCNC: 15.3 MMOL/L (ref 22–29)
CREAT SERPL-MCNC: 0.98 MG/DL (ref 0.57–1)
CRP SERPL-MCNC: 0.75 MG/DL (ref 0–0.5)
DEPRECATED RDW RBC AUTO: 56.2 FL (ref 37–54)
EGFRCR SERPLBLD CKD-EPI 2021: 52.6 ML/MIN/1.73
EOSINOPHIL # BLD AUTO: 0.34 10*3/MM3 (ref 0–0.4)
EOSINOPHIL NFR BLD AUTO: 4 % (ref 0.3–6.2)
ERYTHROCYTE [DISTWIDTH] IN BLOOD BY AUTOMATED COUNT: 15.6 % (ref 12.3–15.4)
FLUAV SUBTYP SPEC NAA+PROBE: NOT DETECTED
FLUBV RNA ISLT QL NAA+PROBE: NOT DETECTED
GLOBULIN UR ELPH-MCNC: 3.2 GM/DL
GLUCOSE SERPL-MCNC: 106 MG/DL (ref 65–99)
HCT VFR BLD AUTO: 40.9 % (ref 34–46.6)
HGB BLD-MCNC: 12.3 G/DL (ref 12–15.9)
HOLD SPECIMEN: NORMAL
HOLD SPECIMEN: NORMAL
IMM GRANULOCYTES # BLD AUTO: 0.05 10*3/MM3 (ref 0–0.05)
IMM GRANULOCYTES NFR BLD AUTO: 0.6 % (ref 0–0.5)
INR PPP: 1.02 (ref 0.9–1.1)
LYMPHOCYTES # BLD AUTO: 1.32 10*3/MM3 (ref 0.7–3.1)
LYMPHOCYTES NFR BLD AUTO: 15.5 % (ref 19.6–45.3)
MAGNESIUM SERPL-MCNC: 2.3 MG/DL (ref 1.7–2.3)
MCH RBC QN AUTO: 29.6 PG (ref 26.6–33)
MCHC RBC AUTO-ENTMCNC: 30.1 G/DL (ref 31.5–35.7)
MCV RBC AUTO: 98.3 FL (ref 79–97)
MONOCYTES # BLD AUTO: 0.39 10*3/MM3 (ref 0.1–0.9)
MONOCYTES NFR BLD AUTO: 4.6 % (ref 5–12)
NEUTROPHILS NFR BLD AUTO: 6.38 10*3/MM3 (ref 1.7–7)
NEUTROPHILS NFR BLD AUTO: 74.9 % (ref 42.7–76)
NRBC BLD AUTO-RTO: 0 /100 WBC (ref 0–0.2)
NT-PROBNP SERPL-MCNC: 1335 PG/ML (ref 0–1800)
PLATELET # BLD AUTO: 335 10*3/MM3 (ref 140–450)
PMV BLD AUTO: 9.9 FL (ref 6–12)
POTASSIUM SERPL-SCNC: 4.2 MMOL/L (ref 3.5–5.2)
PROT SERPL-MCNC: 6.3 G/DL (ref 6–8.5)
PROTHROMBIN TIME: 13.7 SECONDS (ref 12.1–14.7)
RBC # BLD AUTO: 4.16 10*6/MM3 (ref 3.77–5.28)
SARS-COV-2 RNA PNL SPEC NAA+PROBE: NOT DETECTED
SODIUM SERPL-SCNC: 138 MMOL/L (ref 136–145)
T4 FREE SERPL-MCNC: 1.37 NG/DL (ref 0.93–1.7)
TROPONIN T SERPL-MCNC: <0.01 NG/ML (ref 0–0.03)
TROPONIN T SERPL-MCNC: <0.01 NG/ML (ref 0–0.03)
TSH SERPL DL<=0.05 MIU/L-ACNC: 11.88 UIU/ML (ref 0.27–4.2)
WBC NRBC COR # BLD: 8.51 10*3/MM3 (ref 3.4–10.8)
WHOLE BLOOD HOLD COAG: NORMAL
WHOLE BLOOD HOLD SPECIMEN: NORMAL

## 2022-12-13 PROCEDURE — 71275 CT ANGIOGRAPHY CHEST: CPT

## 2022-12-13 PROCEDURE — 93970 EXTREMITY STUDY: CPT

## 2022-12-13 PROCEDURE — 85610 PROTHROMBIN TIME: CPT | Performed by: PHYSICIAN ASSISTANT

## 2022-12-13 PROCEDURE — 84439 ASSAY OF FREE THYROXINE: CPT | Performed by: PHYSICIAN ASSISTANT

## 2022-12-13 PROCEDURE — 83970 ASSAY OF PARATHORMONE: CPT | Performed by: PHYSICIAN ASSISTANT

## 2022-12-13 PROCEDURE — 83880 ASSAY OF NATRIURETIC PEPTIDE: CPT | Performed by: PHYSICIAN ASSISTANT

## 2022-12-13 PROCEDURE — 71045 X-RAY EXAM CHEST 1 VIEW: CPT | Performed by: RADIOLOGY

## 2022-12-13 PROCEDURE — 87636 SARSCOV2 & INF A&B AMP PRB: CPT | Performed by: PHYSICIAN ASSISTANT

## 2022-12-13 PROCEDURE — 93970 EXTREMITY STUDY: CPT | Performed by: RADIOLOGY

## 2022-12-13 PROCEDURE — 0 IOPAMIDOL PER 1 ML: Performed by: STUDENT IN AN ORGANIZED HEALTH CARE EDUCATION/TRAINING PROGRAM

## 2022-12-13 PROCEDURE — 80053 COMPREHEN METABOLIC PANEL: CPT | Performed by: PHYSICIAN ASSISTANT

## 2022-12-13 PROCEDURE — 93923 UPR/LXTR ART STDY 3+ LVLS: CPT

## 2022-12-13 PROCEDURE — 84443 ASSAY THYROID STIM HORMONE: CPT | Performed by: PHYSICIAN ASSISTANT

## 2022-12-13 PROCEDURE — 83735 ASSAY OF MAGNESIUM: CPT | Performed by: PHYSICIAN ASSISTANT

## 2022-12-13 PROCEDURE — 36415 COLL VENOUS BLD VENIPUNCTURE: CPT

## 2022-12-13 PROCEDURE — 99285 EMERGENCY DEPT VISIT HI MDM: CPT

## 2022-12-13 PROCEDURE — 93005 ELECTROCARDIOGRAM TRACING: CPT | Performed by: PHYSICIAN ASSISTANT

## 2022-12-13 PROCEDURE — 25010000002 HEPARIN (PORCINE) 25000-0.45 UT/250ML-% SOLUTION: Performed by: STUDENT IN AN ORGANIZED HEALTH CARE EDUCATION/TRAINING PROGRAM

## 2022-12-13 PROCEDURE — 84484 ASSAY OF TROPONIN QUANT: CPT | Performed by: PHYSICIAN ASSISTANT

## 2022-12-13 PROCEDURE — 25010000002 HEPARIN (PORCINE) PER 1000 UNITS: Performed by: STUDENT IN AN ORGANIZED HEALTH CARE EDUCATION/TRAINING PROGRAM

## 2022-12-13 PROCEDURE — 93923 UPR/LXTR ART STDY 3+ LVLS: CPT | Performed by: RADIOLOGY

## 2022-12-13 PROCEDURE — 71045 X-RAY EXAM CHEST 1 VIEW: CPT

## 2022-12-13 PROCEDURE — 84481 FREE ASSAY (FT-3): CPT | Performed by: PHYSICIAN ASSISTANT

## 2022-12-13 PROCEDURE — 85730 THROMBOPLASTIN TIME PARTIAL: CPT | Performed by: PHYSICIAN ASSISTANT

## 2022-12-13 PROCEDURE — 93010 ELECTROCARDIOGRAM REPORT: CPT | Performed by: INTERNAL MEDICINE

## 2022-12-13 PROCEDURE — 85025 COMPLETE CBC W/AUTO DIFF WBC: CPT | Performed by: PHYSICIAN ASSISTANT

## 2022-12-13 PROCEDURE — 86140 C-REACTIVE PROTEIN: CPT | Performed by: PHYSICIAN ASSISTANT

## 2022-12-13 PROCEDURE — 99222 1ST HOSP IP/OBS MODERATE 55: CPT | Performed by: PHYSICIAN ASSISTANT

## 2022-12-13 RX ORDER — SODIUM CHLORIDE 9 MG/ML
40 INJECTION, SOLUTION INTRAVENOUS AS NEEDED
Status: DISCONTINUED | OUTPATIENT
Start: 2022-12-13 | End: 2022-12-14 | Stop reason: HOSPADM

## 2022-12-13 RX ORDER — HEPARIN SODIUM 10000 [USP'U]/100ML
12 INJECTION, SOLUTION INTRAVENOUS
Status: DISCONTINUED | OUTPATIENT
Start: 2022-12-13 | End: 2022-12-14

## 2022-12-13 RX ORDER — HEPARIN SODIUM 5000 [USP'U]/ML
30 INJECTION, SOLUTION INTRAVENOUS; SUBCUTANEOUS AS NEEDED
Status: DISCONTINUED | OUTPATIENT
Start: 2022-12-13 | End: 2022-12-14

## 2022-12-13 RX ORDER — LIDOCAINE 50 MG/G
1 PATCH TOPICAL EVERY 12 HOURS
Status: CANCELLED | OUTPATIENT
Start: 2022-12-13

## 2022-12-13 RX ORDER — ONDANSETRON 4 MG/1
4 TABLET, ORALLY DISINTEGRATING ORAL EVERY 8 HOURS PRN
COMMUNITY

## 2022-12-13 RX ORDER — HEPARIN SODIUM 5000 [USP'U]/ML
60 INJECTION, SOLUTION INTRAVENOUS; SUBCUTANEOUS AS NEEDED
Status: DISCONTINUED | OUTPATIENT
Start: 2022-12-13 | End: 2022-12-14

## 2022-12-13 RX ORDER — GUAIFENESIN 200 MG/10ML
200 LIQUID ORAL 4 TIMES DAILY PRN
COMMUNITY

## 2022-12-13 RX ORDER — AMOXICILLIN 500 MG/1
500 CAPSULE ORAL 2 TIMES DAILY
COMMUNITY
End: 2022-12-13

## 2022-12-13 RX ORDER — POLYETHYLENE GLYCOL 3350 17 G/17G
17 POWDER, FOR SOLUTION ORAL DAILY
COMMUNITY

## 2022-12-13 RX ORDER — PANTOPRAZOLE SODIUM 40 MG/1
40 TABLET, DELAYED RELEASE ORAL EVERY MORNING
Status: CANCELLED | OUTPATIENT
Start: 2022-12-14

## 2022-12-13 RX ORDER — LISINOPRIL 10 MG/1
20 TABLET ORAL DAILY
Status: CANCELLED | OUTPATIENT
Start: 2022-12-14

## 2022-12-13 RX ORDER — SODIUM CHLORIDE 0.9 % (FLUSH) 0.9 %
10 SYRINGE (ML) INJECTION AS NEEDED
Status: DISCONTINUED | OUTPATIENT
Start: 2022-12-13 | End: 2022-12-14 | Stop reason: HOSPADM

## 2022-12-13 RX ORDER — HYDROCODONE BITARTRATE AND ACETAMINOPHEN 5; 325 MG/1; MG/1
.5-2 TABLET ORAL EVERY 8 HOURS PRN
COMMUNITY

## 2022-12-13 RX ORDER — ONDANSETRON 4 MG/1
4 TABLET, ORALLY DISINTEGRATING ORAL EVERY 8 HOURS PRN
Status: CANCELLED | OUTPATIENT
Start: 2022-12-13

## 2022-12-13 RX ORDER — MEGESTROL ACETATE 40 MG/ML
800 SUSPENSION ORAL DAILY
Status: CANCELLED | OUTPATIENT
Start: 2022-12-14

## 2022-12-13 RX ORDER — SODIUM CHLORIDE 0.9 % (FLUSH) 0.9 %
10 SYRINGE (ML) INJECTION EVERY 12 HOURS SCHEDULED
Status: DISCONTINUED | OUTPATIENT
Start: 2022-12-14 | End: 2022-12-14 | Stop reason: HOSPADM

## 2022-12-13 RX ORDER — CEFDINIR 300 MG/1
300 CAPSULE ORAL 2 TIMES DAILY
COMMUNITY
End: 2022-12-13

## 2022-12-13 RX ORDER — HYDROCODONE BITARTRATE AND ACETAMINOPHEN 5; 325 MG/1; MG/1
.5-2 TABLET ORAL EVERY 8 HOURS PRN
Status: CANCELLED | OUTPATIENT
Start: 2022-12-13

## 2022-12-13 RX ORDER — DEXTROSE MONOHYDRATE 50 MG/ML
100 INJECTION, SOLUTION INTRAVENOUS CONTINUOUS
Status: ACTIVE | OUTPATIENT
Start: 2022-12-14 | End: 2022-12-14

## 2022-12-13 RX ORDER — LIDOCAINE 4 G/G
1-2 PATCH TOPICAL EVERY 12 HOURS
COMMUNITY

## 2022-12-13 RX ORDER — POLYETHYLENE GLYCOL 3350 17 G/17G
17 POWDER, FOR SOLUTION ORAL DAILY
Status: CANCELLED | OUTPATIENT
Start: 2022-12-14

## 2022-12-13 RX ORDER — LISINOPRIL 20 MG/1
20 TABLET ORAL DAILY
COMMUNITY

## 2022-12-13 RX ORDER — OMEPRAZOLE 20 MG/1
20 CAPSULE, DELAYED RELEASE ORAL DAILY
COMMUNITY

## 2022-12-13 RX ORDER — HEPARIN SODIUM 5000 [USP'U]/ML
60 INJECTION, SOLUTION INTRAVENOUS; SUBCUTANEOUS ONCE
Status: COMPLETED | OUTPATIENT
Start: 2022-12-13 | End: 2022-12-13

## 2022-12-13 RX ORDER — ACETAMINOPHEN 325 MG/1
650 TABLET ORAL EVERY 6 HOURS PRN
Status: DISCONTINUED | OUTPATIENT
Start: 2022-12-13 | End: 2022-12-14 | Stop reason: HOSPADM

## 2022-12-13 RX ORDER — NITROGLYCERIN 0.4 MG/1
0.4 TABLET SUBLINGUAL
Status: DISCONTINUED | OUTPATIENT
Start: 2022-12-13 | End: 2022-12-14 | Stop reason: HOSPADM

## 2022-12-13 RX ORDER — BENZONATATE 100 MG/1
100 CAPSULE ORAL 3 TIMES DAILY PRN
COMMUNITY
End: 2022-12-13

## 2022-12-13 RX ORDER — GUAIFENESIN 200 MG/10ML
200 LIQUID ORAL 4 TIMES DAILY PRN
Status: CANCELLED | OUTPATIENT
Start: 2022-12-13

## 2022-12-13 RX ADMIN — HEPARIN SODIUM 12 UNITS/KG/HR: 10000 INJECTION, SOLUTION INTRAVENOUS at 20:21

## 2022-12-13 RX ADMIN — HEPARIN SODIUM 2200 UNITS: 5000 INJECTION, SOLUTION INTRAVENOUS; SUBCUTANEOUS at 20:21

## 2022-12-13 RX ADMIN — IOPAMIDOL 50 ML: 755 INJECTION, SOLUTION INTRAVENOUS at 21:23

## 2022-12-13 RX ADMIN — SODIUM CHLORIDE 500 ML: 9 INJECTION, SOLUTION INTRAVENOUS at 18:49

## 2022-12-13 NOTE — ED NOTES
"Pt has an open area on coccyx, open in a circular shape approximately 5 cm in length and 3 cm in width. Pt has non blanchable areas down both sides of inner buttocks approximately 6 cm in length on both sides.  Daughter stated\"the bed sores have been there a long time\". Pt had a bowel movement and skin care was preformed. Pt positioned on left side off of back.     "

## 2022-12-14 ENCOUNTER — APPOINTMENT (OUTPATIENT)
Dept: CARDIOLOGY | Facility: HOSPITAL | Age: 87
DRG: 299 | End: 2022-12-14
Payer: MEDICARE

## 2022-12-14 VITALS
TEMPERATURE: 98.3 F | SYSTOLIC BLOOD PRESSURE: 157 MMHG | DIASTOLIC BLOOD PRESSURE: 82 MMHG | BODY MASS INDEX: 13.66 KG/M2 | HEIGHT: 64 IN | WEIGHT: 80 LBS | HEART RATE: 76 BPM | OXYGEN SATURATION: 94 % | RESPIRATION RATE: 18 BRPM

## 2022-12-14 LAB
ALBUMIN SERPL-MCNC: 2.75 G/DL (ref 3.5–5.2)
ALBUMIN/GLOB SERPL: 0.8 G/DL
ALP SERPL-CCNC: 78 U/L (ref 39–117)
ALT SERPL W P-5'-P-CCNC: 7 U/L (ref 1–33)
ANION GAP SERPL CALCULATED.3IONS-SCNC: 14 MMOL/L (ref 5–15)
APTT PPP: 57.5 SECONDS (ref 26.5–34.5)
APTT PPP: 67.8 SECONDS (ref 26.5–34.5)
AST SERPL-CCNC: 11 U/L (ref 1–32)
BASOPHILS # BLD AUTO: 0.04 10*3/MM3 (ref 0–0.2)
BASOPHILS NFR BLD AUTO: 0.5 % (ref 0–1.5)
BH CV ECHO MEAS - ACS: 1 CM
BH CV ECHO MEAS - AI P1/2T: 346.2 MSEC
BH CV ECHO MEAS - AO MAX PG: 9.9 MMHG
BH CV ECHO MEAS - AO MEAN PG: 5 MMHG
BH CV ECHO MEAS - AO ROOT DIAM: 3.4 CM
BH CV ECHO MEAS - AO V2 MAX: 157 CM/SEC
BH CV ECHO MEAS - AO V2 VTI: 24.6 CM
BH CV ECHO MEAS - EDV(CUBED): 47.2 ML
BH CV ECHO MEAS - EDV(MOD-SP4): 40.4 ML
BH CV ECHO MEAS - EF(MOD-SP4): 50.2 %
BH CV ECHO MEAS - ESV(CUBED): 15.3 ML
BH CV ECHO MEAS - ESV(MOD-SP4): 20.1 ML
BH CV ECHO MEAS - FS: 31.4 %
BH CV ECHO MEAS - IVS/LVPW: 1 CM
BH CV ECHO MEAS - IVSD: 0.93 CM
BH CV ECHO MEAS - LA DIMENSION: 2 CM
BH CV ECHO MEAS - LAT PEAK E' VEL: 6.7 CM/SEC
BH CV ECHO MEAS - LV DIASTOLIC VOL/BSA (35-75): 30.5 CM2
BH CV ECHO MEAS - LV MASS(C)D: 96.9 GRAMS
BH CV ECHO MEAS - LV SYSTOLIC VOL/BSA (12-30): 15.2 CM2
BH CV ECHO MEAS - LVIDD: 3.6 CM
BH CV ECHO MEAS - LVIDS: 2.48 CM
BH CV ECHO MEAS - LVOT AREA: 2.27 CM2
BH CV ECHO MEAS - LVOT DIAM: 1.7 CM
BH CV ECHO MEAS - LVPWD: 0.92 CM
BH CV ECHO MEAS - MED PEAK E' VEL: 3.7 CM/SEC
BH CV ECHO MEAS - MV A MAX VEL: 110 CM/SEC
BH CV ECHO MEAS - MV E MAX VEL: 91.2 CM/SEC
BH CV ECHO MEAS - MV E/A: 0.83
BH CV ECHO MEAS - PA ACC TIME: 0.05 SEC
BH CV ECHO MEAS - PA PR(ACCEL): 55.2 MMHG
BH CV ECHO MEAS - SI(MOD-SP4): 15.3 ML/M2
BH CV ECHO MEAS - SV(MOD-SP4): 20.3 ML
BH CV ECHO MEASUREMENTS AVERAGE E/E' RATIO: 17.54
BILIRUB SERPL-MCNC: 0.3 MG/DL (ref 0–1.2)
BUN SERPL-MCNC: 37 MG/DL (ref 8–23)
BUN/CREAT SERPL: 41.6 (ref 7–25)
CALCIUM SPEC-SCNC: 9.7 MG/DL (ref 8.2–9.6)
CHLORIDE SERPL-SCNC: 104 MMOL/L (ref 98–107)
CO2 SERPL-SCNC: 17 MMOL/L (ref 22–29)
CREAT SERPL-MCNC: 0.89 MG/DL (ref 0.57–1)
DEPRECATED RDW RBC AUTO: 53.7 FL (ref 37–54)
EGFRCR SERPLBLD CKD-EPI 2021: 59 ML/MIN/1.73
EOSINOPHIL # BLD AUTO: 0.46 10*3/MM3 (ref 0–0.4)
EOSINOPHIL NFR BLD AUTO: 5.4 % (ref 0.3–6.2)
ERYTHROCYTE [DISTWIDTH] IN BLOOD BY AUTOMATED COUNT: 15.5 % (ref 12.3–15.4)
FOLATE SERPL-MCNC: 7.9 NG/ML (ref 4.78–24.2)
GLOBULIN UR ELPH-MCNC: 3.4 GM/DL
GLUCOSE SERPL-MCNC: 120 MG/DL (ref 65–99)
HCT VFR BLD AUTO: 32.8 % (ref 34–46.6)
HGB BLD-MCNC: 10.2 G/DL (ref 12–15.9)
IMM GRANULOCYTES # BLD AUTO: 0.02 10*3/MM3 (ref 0–0.05)
IMM GRANULOCYTES NFR BLD AUTO: 0.2 % (ref 0–0.5)
LYMPHOCYTES # BLD AUTO: 1.4 10*3/MM3 (ref 0.7–3.1)
LYMPHOCYTES NFR BLD AUTO: 16.3 % (ref 19.6–45.3)
MAXIMAL PREDICTED HEART RATE: 123 BPM
MCH RBC QN AUTO: 29.6 PG (ref 26.6–33)
MCHC RBC AUTO-ENTMCNC: 31.1 G/DL (ref 31.5–35.7)
MCV RBC AUTO: 95.1 FL (ref 79–97)
MONOCYTES # BLD AUTO: 0.54 10*3/MM3 (ref 0.1–0.9)
MONOCYTES NFR BLD AUTO: 6.3 % (ref 5–12)
NEUTROPHILS NFR BLD AUTO: 6.13 10*3/MM3 (ref 1.7–7)
NEUTROPHILS NFR BLD AUTO: 71.3 % (ref 42.7–76)
NRBC BLD AUTO-RTO: 0 /100 WBC (ref 0–0.2)
PLATELET # BLD AUTO: 289 10*3/MM3 (ref 140–450)
PMV BLD AUTO: 9.9 FL (ref 6–12)
POTASSIUM SERPL-SCNC: 3.7 MMOL/L (ref 3.5–5.2)
PROT SERPL-MCNC: 6.1 G/DL (ref 6–8.5)
PTH-INTACT SERPL-MCNC: 23.1 PG/ML (ref 15–65)
QT INTERVAL: 324 MS
QTC INTERVAL: 411 MS
RBC # BLD AUTO: 3.45 10*6/MM3 (ref 3.77–5.28)
SODIUM SERPL-SCNC: 135 MMOL/L (ref 136–145)
STRESS TARGET HR: 105 BPM
T3FREE SERPL-MCNC: 1.9 PG/ML (ref 2–4.4)
VIT B12 BLD-MCNC: 296 PG/ML (ref 211–946)
WBC NRBC COR # BLD: 8.59 10*3/MM3 (ref 3.4–10.8)

## 2022-12-14 PROCEDURE — 92610 EVALUATE SWALLOWING FUNCTION: CPT

## 2022-12-14 PROCEDURE — 80053 COMPREHEN METABOLIC PANEL: CPT | Performed by: INTERNAL MEDICINE

## 2022-12-14 PROCEDURE — 85025 COMPLETE CBC W/AUTO DIFF WBC: CPT | Performed by: STUDENT IN AN ORGANIZED HEALTH CARE EDUCATION/TRAINING PROGRAM

## 2022-12-14 PROCEDURE — 82746 ASSAY OF FOLIC ACID SERUM: CPT | Performed by: PHYSICIAN ASSISTANT

## 2022-12-14 PROCEDURE — 85730 THROMBOPLASTIN TIME PARTIAL: CPT | Performed by: INTERNAL MEDICINE

## 2022-12-14 PROCEDURE — 93306 TTE W/DOPPLER COMPLETE: CPT | Performed by: INTERNAL MEDICINE

## 2022-12-14 PROCEDURE — 82607 VITAMIN B-12: CPT | Performed by: PHYSICIAN ASSISTANT

## 2022-12-14 PROCEDURE — 85730 THROMBOPLASTIN TIME PARTIAL: CPT | Performed by: PHYSICIAN ASSISTANT

## 2022-12-14 PROCEDURE — 93306 TTE W/DOPPLER COMPLETE: CPT

## 2022-12-14 PROCEDURE — 99239 HOSP IP/OBS DSCHRG MGMT >30: CPT | Performed by: STUDENT IN AN ORGANIZED HEALTH CARE EDUCATION/TRAINING PROGRAM

## 2022-12-14 RX ORDER — POLYETHYLENE GLYCOL 3350 17 G/17G
17 POWDER, FOR SOLUTION ORAL DAILY
Status: DISCONTINUED | OUTPATIENT
Start: 2022-12-14 | End: 2022-12-14 | Stop reason: HOSPADM

## 2022-12-14 RX ORDER — LEVOTHYROXINE SODIUM 0.03 MG/1
25 TABLET ORAL
Status: DISCONTINUED | OUTPATIENT
Start: 2022-12-15 | End: 2022-12-14 | Stop reason: HOSPADM

## 2022-12-14 RX ORDER — GUAIFENESIN 200 MG/10ML
200 LIQUID ORAL 4 TIMES DAILY PRN
Status: DISCONTINUED | OUTPATIENT
Start: 2022-12-14 | End: 2022-12-14 | Stop reason: HOSPADM

## 2022-12-14 RX ORDER — PANTOPRAZOLE SODIUM 40 MG/1
40 TABLET, DELAYED RELEASE ORAL EVERY MORNING
Status: DISCONTINUED | OUTPATIENT
Start: 2022-12-14 | End: 2022-12-14 | Stop reason: HOSPADM

## 2022-12-14 RX ORDER — DIPHENOXYLATE HYDROCHLORIDE AND ATROPINE SULFATE 2.5; .025 MG/1; MG/1
1 TABLET ORAL DAILY
Status: DISCONTINUED | OUTPATIENT
Start: 2022-12-14 | End: 2022-12-14 | Stop reason: HOSPADM

## 2022-12-14 RX ORDER — HYDROCODONE BITARTRATE AND ACETAMINOPHEN 5; 325 MG/1; MG/1
1 TABLET ORAL EVERY 8 HOURS PRN
Status: DISCONTINUED | OUTPATIENT
Start: 2022-12-14 | End: 2022-12-14 | Stop reason: HOSPADM

## 2022-12-14 RX ORDER — ONDANSETRON 4 MG/1
4 TABLET, ORALLY DISINTEGRATING ORAL EVERY 8 HOURS PRN
Status: CANCELLED | OUTPATIENT
Start: 2022-12-14

## 2022-12-14 RX ORDER — LIDOCAINE 50 MG/G
1 PATCH TOPICAL
Status: DISCONTINUED | OUTPATIENT
Start: 2022-12-14 | End: 2022-12-14 | Stop reason: HOSPADM

## 2022-12-14 RX ORDER — LEVOTHYROXINE SODIUM 0.03 MG/1
25 TABLET ORAL
Qty: 30 TABLET | Refills: 0 | Status: SHIPPED | OUTPATIENT
Start: 2022-12-15

## 2022-12-14 RX ORDER — DIPHENOXYLATE HYDROCHLORIDE AND ATROPINE SULFATE 2.5; .025 MG/1; MG/1
1 TABLET ORAL DAILY
Qty: 30 TABLET | Refills: 0 | Status: SHIPPED | OUTPATIENT
Start: 2022-12-15

## 2022-12-14 RX ORDER — MEGESTROL ACETATE 40 MG/ML
800 SUSPENSION ORAL DAILY
Status: DISCONTINUED | OUTPATIENT
Start: 2022-12-14 | End: 2022-12-14 | Stop reason: HOSPADM

## 2022-12-14 RX ORDER — LISINOPRIL 10 MG/1
20 TABLET ORAL DAILY
Status: DISCONTINUED | OUTPATIENT
Start: 2022-12-14 | End: 2022-12-14 | Stop reason: HOSPADM

## 2022-12-14 RX ADMIN — LISINOPRIL 20 MG: 10 TABLET ORAL at 09:39

## 2022-12-14 RX ADMIN — Medication 1 TABLET: at 09:39

## 2022-12-14 RX ADMIN — LIDOCAINE 1 PATCH: 700 PATCH TOPICAL at 09:38

## 2022-12-14 RX ADMIN — DEXTROSE MONOHYDRATE 100 ML/HR: 50 INJECTION, SOLUTION INTRAVENOUS at 00:01

## 2022-12-14 RX ADMIN — Medication 10 ML: at 09:39

## 2022-12-14 RX ADMIN — MEGESTROL ACETATE 800 MG: 40 SUSPENSION ORAL at 09:39

## 2022-12-14 RX ADMIN — APIXABAN 10 MG: 5 TABLET, FILM COATED ORAL at 13:28

## 2022-12-14 NOTE — DISCHARGE PLACEMENT REQUEST
"Ashlee Go (89 y.o. Female)     Date of Birth   07/27/1925    Social Security Number       Address   [address] [place] [address]    Home Phone   [phone]    MRN   [id number]       Orthodox   None    Marital Status                               Admission Date   12/13/22    Admission Type   Emergency    Admitting Provider   Katharina Hernandez DO    Attending Provider   Alvarez Steve DO    Department, Room/Bed   [address], 3310/2S       Discharge Date       Discharge Disposition   Home or Self Care    Discharge Destination                               Attending Provider: Alvarez Steve DO    Allergies: No Known Allergies    Isolation: None   Infection: None   Code Status: No CPR    Ht: 162.6 cm (64\")   Wt: 36.3 kg (80 lb)    Admission Cmt: None   Principal Problem: Acute deep vein thrombosis (DVT) of femoral vein of left lower extremity (HCC) [I82.412]                 Active Insurance as of 12/13/2022     Primary Coverage     Payor Plan Insurance Group Employer/Plan Group    MEDICARE MEDICARE A & B      Payor Plan Address Payor Plan Phone Number Payor Plan Fax Number Effective Dates    [address] [phone]  7/1/1990 - None Entered    [facility] [id number]       Subscriber Name Subscriber Birth Date Member ID       ASHLEE GO 7/27/1925 [id number]           Secondary Coverage     Payor Plan Insurance Group Employer/Plan Group    [facility] [id number]     Payor Plan Address Payor Plan Phone Number Payor Plan Fax Number Effective Dates    [address] [address] [phone]  1/1/2010 - None Entered    [facility] [id number]       Subscriber Name Subscriber Birth Date Member ID       ASHLEE GO 7/27/1925 [id number]                 Emergency Contacts      (Rel.) Home Phone Work Phone Mobile Phone    OSVALDO GO (Daughter) [phone] -- --    Bella John (Daughter) [phone] -- --    BREONNATRACI (Daughter) -- -- [phone]    " "ANNETTE CAM (Daughter) [phone] -- --        [address] [address]  Dept. Phone:  [phone]  Dept. Fax:  [phone] Date Ordered: Dec 14, 2022         Patient:  Ashlee Go MRN:  [id number]   [address]  [place] [address] :  1925  SSN:    Phone: [phone] Sex:  F     Weight: 36.3 kg (80 lb)         Ht Readings from Last 1 Encounters:   22 162.6 cm (64\")         Hospital Bed  (Order ID: [id number])    Diagnosis:  Debility (R53.81 [ICD-10-CM] 799.3 [ICD-9-CM])   Quantity:  1     Equipment:  Hospital Bed, Semi-Electirc w/ Mattress & w/ Rails  Accessories:  Gel Pressure Mattress Pad (Group 1 Support)  Length of Need (99 Months = Lifetime): 99 Months = Lifetime        Authorizing Provider's Phone: [phone]  Authorizing Provider:Alvarez Steve DO  Authorizing Provider's NPI: [id number]  Order Entered By: Alvarez Steve DO 2022 12:58 PM     Electronically signed by: Alvarez Steve DO 2022 12:58 PM            History & Physical      Mindy Lang PA-C at 22 [phone]     Attestation signed by Katharina Hernandez DO at 22 0647    I have reviewed this documentation and agree.                      [facility] Hospital Medicine Services  History & Physical    Patient Identification:  Name:  Ashlee Go  Age:  89 y.o.  Sex:  female  :  1925  MRN:  [id number]   Visit Number:  [id number]  Primary Care Physician:  Sharon Navarro APRN Subjective     2022   Chief complaint:   Chief Complaint   Patient presents with   • Weakness - Generalized     History of presenting illness:      Ashlee Go is a 89 y.o. female with past medical history significant for essential hypertension who presents to [facility] ED for evaluation of generalized weakness and bilateral lower extremity swelling.    The patient's daughter is present at bedside to " provide majority of history. The patient is very hard of hearing and difficult to obtain a history from. The patient only minimally participates in exam and will occasionally only mumble a few words, but according to the daughter this is the patient's baseline. The family reported to the ED provider that patient has had swelling of her lower extremities with weeping of fluid for the past 2 to 3 weeks or possibly longer.  They did note that her left leg has been more swollen than her right leg and that her feet and legs become painful at night.  She was seen by her PCP who was concerned that the patient may have CHF. The patient's daughter reports that the patient was ambulatory in June and had a bad fall requiring her to get stitches on her face and since that time the patient has been nonambulatory using a wheelchair as she is afraid to walk. The patient has had no previous history of clots. She has no known cancers. She is currently not on anticoagulation at home. She has not had any history of internal bleeding. The daughter does state that the patient does have difficulty swallowing and sometimes has a difficult time getting the patient to take her medications despite crushing them and putting them in applesauce/pudding. The patient currently seems in no acute distress and resting comfortably in bed.     Upon arrival to the ED, vital signs were temperature 97.6, heart rate 120, respirations 18, blood pressure 117/91, SPO2 100% on room air.  CMP with glucose 106, CO2 15.3, anion gap 18.7, BUN 37, BUN/creatinine ratio 37.8, calcium 10.1, EGFR 52.6, albumin 3.06, otherwise unremarkable.  CBC with MCV 98.3, RDW 15.6, RDW-SD 56.2, otherwise unremarkable.  Troponin T negative x1.  proBNP normal.  TSH 11.880.  Free T4 1.37.  CRP 0.75.  Magnesium 2.3.  COVID-19 and influenza A/B swab negative.  Chest x-ray with findings of COPD.  CT chest PE protocol with nonocclusive eccentric thrombus within both the right and left  pulmonary arteries and could potentially represent chronic thrombus but exam positive for PE, findings of the abdomen includes left renal mass and nonspecific pneumobilia with numerous tiny nodular foci scattered within the lungs which are nonspecific in setting of known left renal mass, diffuse mosaic attenuation which may reflect small vessels or small airway disease.  BLE venous Doppler positive for DVT in left common femoral vein.  Bilateral arterial Doppler with monophasic waveforms and left posterior tibial artery but no occlusion.    Patient will be admitted to the telemetry floor for further evaluation and monitoring.     ---------------------------------------------------------------------------------------------------------------------   Review of Systems   Unable to perform ROS: Other (Majority of history is obtained from daughter as patient is very hard of hearing and does not participate much in exam)   Constitutional: Negative for chills and fever.   Cardiovascular: Positive for leg swelling (L > R ).   Musculoskeletal: Positive for gait problem (uses wheelchair).        Daughter reports patient complains of bilateral leg pain most commonly at night        ---------------------------------------------------------------------------------------------------------------------   Past Medical History:   Diagnosis Date   • Hip pain, acute, left    • Hypertension      No past surgical history on file.  No family history on file.  Social History     Socioeconomic History   • Marital status:    Tobacco Use   • Smoking status: Never   • Smokeless tobacco: Never   Substance and Sexual Activity   • Alcohol use: No   • Drug use: No   • Sexual activity: Defer     ---------------------------------------------------------------------------------------------------------------------   Allergies:  Patient has no known  allergies.  ---------------------------------------------------------------------------------------------------------------------   Home medications:    Medications below are reported home medications pulling from within the system; at this time, these medications have not been reconciled unless otherwise specified and are in the verification process for further verifcation as current home medications.  (Not in a hospital admission)      Hospital Scheduled Meds:     heparin, 12 Units/kg/hr, Last Rate: 12 Units/kg/hr (12/13/22 2021)        Current listed hospital scheduled medications may not yet reflect those currently placed in orders that are signed and held awaiting patient's arrival to floor.   ---------------------------------------------------------------------------------------------------------------------     Objective     Vital Signs:  Temp:  [97.6 °F (36.4 °C)] 97.6 °F (36.4 °C)  Heart Rate:  [] 96  Resp:  [18] 18  BP: (117-154)/(81-95) 138/87      12/13/22  1653   Weight: 36.3 kg (80 lb)     Body mass index is 13.73 kg/m².  ---------------------------------------------------------------------------------------------------------------------       Physical Exam  Constitutional:       General: She is awake.      Appearance: She is ill-appearing (chronically).      Comments: Resting in bed upon arrival. Appears in no acute distress. Very hard of hearing, hears best out of left ear.   HENT:      Head: Normocephalic and atraumatic.      Right Ear: External ear normal.      Left Ear: External ear normal.      Nose: Nose normal.      Mouth/Throat:      Mouth: Mucous membranes are dry.      Pharynx: Oropharynx is clear.   Eyes:      Extraocular Movements: Extraocular movements intact.      Conjunctiva/sclera: Conjunctivae normal.      Pupils: Pupils are equal, round, and reactive to light.   Cardiovascular:      Rate and Rhythm: Normal rate and regular rhythm.      Pulses: Normal pulses.      Heart sounds:  Normal heart sounds. No murmur heard.    No friction rub. No gallop.      Comments: Difficult to detect pulses of lower extremities; Arterial US obtained in ED.   Pulmonary:      Effort: Pulmonary effort is normal. No respiratory distress.      Breath sounds: Normal breath sounds. No wheezing, rhonchi or rales.      Comments: Currently on room air saturating %.  Abdominal:      General: Abdomen is flat. Bowel sounds are normal. There is no distension.      Palpations: Abdomen is soft.      Tenderness: There is no abdominal tenderness. There is no guarding.   Musculoskeletal:         General: Normal range of motion.      Cervical back: Normal range of motion and neck supple.      Right lower leg: No edema.      Left lower le+ Edema present.   Skin:     General: Skin is warm and dry.      Findings: Wound (coccyx) present.          Neurological:      General: No focal deficit present.      Mental Status: She is alert. Mental status is at baseline.      Comments: Difficult to determine as patient is minimally participating in exam and will occasionally only mumble a few words.    Psychiatric:         Mood and Affect: Mood normal.         Behavior: Behavior normal. Behavior is cooperative.         Thought Content: Thought content normal.         ---------------------------------------------------------------------------------------------------------------------  EKG:    Pending cardiology interpretation.  Per my review, EKG shows normal sinus rhythm with heart rate 97 and  ms.        I have personally looked at both the EKG and the telemetry strips.  ---------------------------------------------------------------------------------------------------------------------   Results from last 7 days   Lab Units 22  1741   CRP mg/dL 0.75*   WBC 10*3/mm3 8.51   HEMOGLOBIN g/dL 12.3   HEMATOCRIT % 40.9   MCV fL 98.3*   MCHC g/dL 30.1*   PLATELETS 10*3/mm3 335   INR  1.02         Results from last 7 days    Lab Units 12/13/22  1741   SODIUM mmol/L 138   POTASSIUM mmol/L 4.2   MAGNESIUM mg/dL 2.3   CHLORIDE mmol/L 104   CO2 mmol/L 15.3*   BUN mg/dL 37*   CREATININE mg/dL 0.98   CALCIUM mg/dL 10.1*   GLUCOSE mg/dL 106*   ALBUMIN g/dL 3.06*   BILIRUBIN mg/dL 0.3   ALK PHOS U/L 84   AST (SGOT) U/L 10   ALT (SGPT) U/L 5   Estimated Creatinine Clearance: 18.8 mL/min (by C-G formula based on SCr of 0.98 mg/dL).  No results found for: AMMONIA  Results from last 7 days   Lab Units 12/13/22 2006 12/13/22 1741   TROPONIN T ng/mL <0.010 <0.010     Results from last 7 days   Lab Units 12/13/22 1741   PROBNP pg/mL 1,335.0     Lab Results   Component Value Date    HGBA1C 5.50 09/04/2020     Lab Results   Component Value Date    TSH 11.880 (H) 12/13/2022    FREET4 1.37 12/13/2022     No results found for: PREGTESTUR, PREGSERUM, HCG, HCGQUANT  Pain Management Panel     Pain Management Panel Latest Ref Rng & Units 4/7/2022 9/4/2020    AMPHETAMINES SCREEN, URINE Negative Negative Negative    BARBITURATES SCREEN Negative Negative Negative    BENZODIAZEPINE SCREEN, URINE Negative Negative Negative    BUPRENORPHINEUR Negative Negative Negative    COCAINE SCREEN, URINE Negative Negative Negative    METHADONE SCREEN, URINE Negative Negative Negative    METHAMPHETAMINEUR Negative Negative -            ---------------------------------------------------------------------------------------------------------------------  Imaging Results (Last 7 Days)     Procedure Component Value Units Date/Time    CT Angiogram Chest Pulmonary Embolism [[id number]] Collected: 12/13/22 2143     Updated: 12/13/22 2145    Narrative:      CT CHEST PULMONARY EMBOLISM W CONTRAST    INDICATION:   Known left lower extremity DVT, evaluation for pulmonary embolus    TECHNIQUE:   CT angiogram of the chest with IV contrast. 3-D reconstructions were obtained and reviewed.   Radiation dose reduction techniques included automated exposure control or exposure modulation  based on body size. Count of known CT and cardiac nuc med studies  performed in previous 12 months: 6.     COMPARISON:   CT chest October 4, 2022, CT abdomen/pelvis November 16, 2022    FINDINGS:     Adequate opacification of the pulmonary arteries. There is nonocclusive of eccentric thrombus within both the right and the left pulmonary arteries. The distal segmental branches appear well-opacified. While age indeterminate, could potentially represent  chronic thrombus but the exam is positive for pulmonary embolus.    Normal heart size. No pericardial effusion.  No lymphadenopathy within the chest.    Upper abdomen demonstrates nonspecific pneumobilia. Neither kidney is adequately visualized but would favor presence of at least a left renal mass measuring up to 3.6 cm (better demonstrated on prior CT of November 17, 2022.    Pleural parenchymal scarring at the right greater than left lung apex. There are numerous tiny nodular foci scattered within the lungs, nonspecific in the setting of known left renal mass. The pattern is overall similar as that seen in October 4, 2022  however. There is some diffuse mosaic attenuation which may reflect small vessels or small airways disease.    No destructive bony lesion.      Impression:      1. There is nonocclusive eccentric thrombus within both the right and the left pulmonary arteries. The distal segmental branches appear well-opacified. While age indeterminate, this could potentially represent chronic thrombus but the exam is positive  for pulmonary embolus.  2. Findings in the upper abdomen better demonstrated on CT of November 17, 2022. This includes left renal mass and nonspecific pneumobilia.  3. There are numerous tiny nodular foci scattered within the lungs, nonspecific in the setting of known left renal mass. The pattern is overall similar as that seen in October 4, 2022 however. There is some diffuse mosaic attenuation which may reflect  small vessels or small  airways disease      Findings were discussed by phone conversation with Dr. Morales in the ER at 9:35 PM 12/13/2022.    Signer Name: DELMI DIAZ MD   Signed: 12/13/2022 9:43 PM   Workstation Name: [email or web address]    [facility]    US Arterial Doppler Lower Extremity Bilateral [[id number]] Collected: 12/13/22 1814     Updated: 12/13/22 1817    Narrative:      EXAMINATION: US ARTERIAL DOPPLER LOWER EXTREMITY  BILATERAL-      CLINICAL INDICATION: pain in legs at rest, swelling        COMPARISON: None available     PROCEDURE:  Color Doppler imaging with pulse Doppler waveform to evaluate lower  extremity arteries     FINDINGS: Any stenoses are evaluated using the NASCET or similar method.     There are biphasic and triphasic waveforms throughout with the exception  of the left posterior tibial artery where the waveforms are monophasic     No occlusive segments     No significant velocity elevation.       Impression:      Monophasic waveforms in the left posterior tibial artery but  no occlusion.      This report was finalized on 12/13/2022 6:15 PM by Dr. Gus Blue MD.       US Venous Doppler Lower Extremity Bilateral (duplex) [[id number]] Collected: 12/13/22 1759     Updated: 12/13/22 1811    Narrative:      US VENOUS DOPPLER LOWER EXTREMITY BILATERAL (DUPLEX)-     CLINICAL INDICATION: bilateral LE swelling, L>R, painful        COMPARISON: None available      TECHNIQUE: Color Doppler imaging was used with compression and  augmentation to evaluate the lower extremity deep venous system.     FINDINGS:   No evidence of deep vein thrombus in the right lower extremity.     Left lower extremity demonstrates internal clot and noncompressibility  in the left common femoral vein.       Impression:      1. Positive DVT in the left common femoral vein.     This report was finalized on 12/13/2022 5:59 PM by Dr. Gus Blue MD.       XR Chest 1 View [[id number]] Collected: 12/13/22 1753     Updated: 12/13/22 1756     Narrative:      XR CHEST 1 VW-     CLINICAL INDICATION: peripheral edema        COMPARISON: 10/04/2022      TECHNIQUE: Single frontal view of the chest.     FINDINGS:      LUNGS: COPD      HEART AND MEDIASTINUM: Heart and mediastinal contours are unremarkable        SKELETON: Bony and soft tissue structures are unremarkable.             Impression:      COPD     This report was finalized on 12/13/2022 5:53 PM by Dr. Gus Blue MD.           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   -Continue heparin gtt and titrate per protocol.   -Pharmacy consulted to assist with pricing of NOAC's.   -Obtain a.m. TTE.   -Encourage incentive spirometer.     -Prerenal azotemia  -Anion-gap metabolic acidosis  -Likely due to poor oral intake.   -Continuous IV fluids ordered.   -Encourage oral intake as possible.   -Repeat a.m. CMP.     -Hypercalcemia (corrected Ca for albumin = 10.9)  -Likely due to poor oral intake.   -Continuous IV fluids.   -Check PTH.   -Repeat a.m. CMP.     -Subclinical hypothyroidism  -TSH 11.880, free T4 1.37; obtain free T3.  -Will start on Synthroid 25 mcg.   -Will need repeat thyroid studies in outpatient setting.     -Macrocytosis without anemia  -Obtain vitamin B12 and folate    -Left renal mass, 3.6 cm  -Noted on CT A/P 10/4/22 measuring 3.4 cm at that time.   -Continue outpatient follow-up and monitoring.     -Decubitus ulcer of coccyx  -Currently doesn't appear actively infected.   -Wound care consulted.   -Turn  patient every 2 hours.     -Essential hypertension  -BP stable.   -Monitor per hospital protocol.   -Review home medications once reconciled.     -Reported dysphagia  -SLP consulted to determine diet modifications.   -Aspiration precautions.     -Malnutrition, BMI 13.73 kg/m²  -Daily multivitamin ordered.   -Nutrition consulted to perform malnutrition severity assessment.     -------------------------------  CODE STATUS: DNR/DNI; discussed with patient's daughter at bedside    High risk secondary to acute PE, acute versus chronic DVT  -------------------------------  Expected length of stay:  INPATIENT status due to the need for care which can only be reasonably provided in an hospital setting such as aggressive/expedited ancillary services and/or consultation services, the necessity for IV medications, close physician monitoring and/or the possible need for procedures.  In such, I feel patient's risk for adverse outcomes and need for care warrant INPATIENT evaluation and predict the patient's care encounter to likely last beyond 2 midnights.     Disposition: Pending clinical course    Mindy Lang PA-C  12/13/22  22:46 EST    ---------------------------------------------------------------------------------------------------------------------           Electronically signed by Katharina Hernandez DO at 12/14/22 0684

## 2022-12-14 NOTE — DISCHARGE SUMMARY
[facility] HOSPITALISTS DISCHARGE SUMMARY    Patient Identification:  Name:  Ashlee Go  Age:  89 y.o.  Sex:  female  :  1925  MRN:  [id number]  Visit Number:  [id number]    Date of Admission: 2022  Date of Discharge:  2022     PCP: Sharon Navarro APRN    DISCHARGE DIAGNOSIS  #Acute versus chronic bilateral pulmonary embolism, submassive, provoked  #Acute DVT of left common femoral vein  #Subclinical hypothyroidism  #Left renal mass, likely malignancy, known poa  #Sacral decubitus ulcer, poa, stage II  #Essential hypertension  #Acute on chronic debility  #Malnutrition, BMI 13 with cachexia    CONSULTS   None    PROCEDURES PERFORMED  None    HOSPITAL COURSE  Patient is a 89 y.o. female presented to [facility] complaining of lysed weakness and bilateral lower extremity swelling..  Please see the admitting history and physical for further details.  Patient was awake, alert, only mumbled a few words during the hospitalization which was at her baseline per her daughter at bedside.  Work-up noted positive DVT in the left common femoral vein and a nonocclusive thrombus in both the right and left pulmonary arteries.  Pulmonary embolisms were submassive and considered provoked given patient's acute on chronic debility and essentially bedbound status.  An echocardiogram was negative for RV strain and she remained hemodynamically stable without the need for supplemental oxygen.  She was started on Eliquis for p.o. anticoagulation.  I did discuss these findings with her daughter present at baseline and she has a left renal mass which was reported concerns for malignancy but patient did not want further work-up and family wishes to manage medically without further aggressive care.  Given goals of care, I discussed with family that we could switch her to an oral DOAC and allow her to return home as DVT and PEs have equal efficacy on oral DOAC's and would recommend continuing  these medications indefinitely in the setting of debility and potential underlying malignancy.  Family was agreeable and wished to return home with patient to continue care.  She was discharged to home health with the hopes of some physical therapy to help her get back on her feet.  I did discuss home hospice but daughter would like to give her a chance first with physical therapy before making this decision.  Patient was discharged with instructions to follow-up with PCP within 1 week.    VITAL SIGNS:  Temp:  [94.9 °F (34.9 °C)-98.5 °F (36.9 °C)] 94.9 °F (34.9 °C)  Heart Rate:  [] 79  Resp:  [18] 18  BP: (117-154)/(79-95) 137/79  SpO2:  [94 %-100 %] 94 %  on   ;   Device (Oxygen Therapy): room air    Body mass index is 13.73 kg/m².  Wt Readings from Last 3 Encounters:   12/13/22 36.3 kg (80 lb)   11/16/22 36.3 kg (80 lb)   10/04/22 37.6 kg (83 lb)       PHYSICAL EXAM:  Constitutional: Elderly, chronically ill-appearing female, cachectic, awake, alert and able to answer some questions with 1-2 word replies, NAD  HENT:  Head:  Normocephalic and atraumatic.  Mouth:  Moist mucous membranes.    Eyes:  Conjunctivae and EOM are normal.  No scleral icterus.    Neck:  Neck supple.  No JVD present.    Cardiovascular:  Normal rate, regular rhythm and normal heart sounds with no murmur.  Pulmonary/Chest:  No respiratory distress, no wheezes, no crackles, with normal breath sounds and good air movement.  Pulmonary cachexia present  Abdominal:  Soft. No distension and no tenderness.   Musculoskeletal:  No tenderness, and no deformity.  No red or swollen joints anywhere.  Cachectic  Neurological:  Alert and oriented to self, place but not time.  No cranial nerve deficit.    Skin:  Skin is warm and dry. No rash noted. No pallor.   Peripheral vascular: no clubbing, no cyanosis, +1 edema bilaterally.    DISCHARGE DISPOSITION   Stable    DISCHARGE MEDICATIONS:     Discharge Medications      New Medications      Instructions  Start Date   apixaban 5 MG tablet tablet  Commonly known as: ELIQUIS   10 mg, Oral, Every 12 Hours Scheduled      apixaban 5 MG tablet tablet  Commonly known as: ELIQUIS   5 mg, Oral, Every 12 Hours Scheduled   Start Date: December 21, 2022     levothyroxine 25 MCG tablet  Commonly known as: SYNTHROID, LEVOTHROID   25 mcg, Oral, Every Early Morning   Start Date: December 15, 2022     multivitamin tablet tablet   1 tablet, Oral, Daily   Start Date: December 15, 2022        Continue These Medications      Instructions Start Date   guaifenesin 100 MG/5ML liquid  Commonly known as: ROBITUSSIN   200 mg, Oral, 4 Times Daily PRN      HYDROcodone-acetaminophen 5-325 MG per tablet  Commonly known as: NORCO   0.5-2 tablets, Oral, Every 8 Hours PRN      Lidocaine 4 % patch   1-2 patches, Apply externally, Every 12 Hours      lisinopril 20 MG tablet  Commonly known as: PRINIVIL,ZESTRIL   20 mg, Oral, Daily      megestrol 40 MG/ML suspension  Commonly known as: MEGACE   800 mg, Oral, Daily      omeprazole 20 MG capsule  Commonly known as: priLOSEC   20 mg, Oral, Daily      ondansetron ODT 4 MG disintegrating tablet  Commonly known as: ZOFRAN-ODT   4 mg, Translingual, Every 8 Hours PRN      polyethylene glycol 17 g packet  Commonly known as: MIRALAX   17 g, Oral, Daily               Additional Instructions for the Follow-ups that You Need to Schedule     Ambulatory Referral to Home Health (Hospital)   As directed      Face to Face Visit Date: 12/14/2022    Follow-up provider for Plan of Care?: I treated the patient in an acute care facility and will not continue treatment after discharge.    Follow-up provider: MIKE MARTINEZ [[id number]]    Reason/Clinical Findings: bilateral PEs, DVT    Describe mobility limitations that make leaving home difficult: a/c debility, advanced age, PEs    Nursing/Therapeutic Services Requested: Physical Therapy Occupational Therapy    PT orders: Therapeutic exercise Total joint pathway Strengthening     Occupational orders: Activities of daily living Energy conservation Strengthening    Frequency: 1 Week 1         Discharge Follow-up with PCP   As directed       Currently Documented PCP:    Sharon Navarro APRN    PCP Phone Number:    [phone]     Follow Up Details: [address] [place] [address] fu            Follow-up Information     Sharon Navarro APRN .    Specialty: Nurse Practitioner  Why: [address] [place] [address] fu  Contact information:  [address] [place] [id number]  [phone]                          TEST  RESULTS PENDING AT DISCHARGE  Pending Labs     Order Current Status    Folate In process    T3, Free In process    Vitamin B12 In process           CODE STATUS  Code Status and Medical Interventions:   Ordered at: 12/13/22 2212     Medical Intervention Limits:    NO intubation (DNI)     Code Status (Patient has no pulse and is not breathing):    No CPR (Do Not Attempt to Resuscitate)     Medical Interventions (Patient has pulse or is breathing):    Limited Support       The ASCVD Risk score (Farrah JJ, et al., 2019) failed to calculate for the following reasons:    The 2019 ASCVD risk score is only valid for ages 40 to 79    The patient has a prior MI or stroke diagnosis     Alvarez Steve DO  [facility]  12/14/22  12:58 EST    Please note that this discharge summary required more than 30 minutes to complete.

## 2022-12-14 NOTE — DISCHARGE PLACEMENT REQUEST
"Ashlee Go (89 y.o. Female)     Date of Birth   07/27/1925    Social Security Number       Address   [address] [place] [address]    Home Phone   [phone]    MRN   [id number]       Bahai   None    Marital Status                               Admission Date   12/13/22    Admission Type   Emergency    Admitting Provider   Katharina Hernandez DO    Attending Provider   Alvarez Steve DO    Department, Room/Bed   [address], 3310/2S       Discharge Date       Discharge Disposition   Home or Self Care    Discharge Destination                               Attending Provider: Alvarez Steve DO    Allergies: No Known Allergies    Isolation: None   Infection: None   Code Status: No CPR    Ht: 162.6 cm (64\")   Wt: 36.3 kg (80 lb)    Admission Cmt: None   Principal Problem: Acute deep vein thrombosis (DVT) of femoral vein of left lower extremity (HCC) [I82.412]                 Active Insurance as of 12/13/2022     Primary Coverage     Payor Plan Insurance Group Employer/Plan Group    MEDICARE MEDICARE A & B      Payor Plan Address Payor Plan Phone Number Payor Plan Fax Number Effective Dates    [address] [phone]  7/1/1990 - None Entered    [facility] [id number]       Subscriber Name Subscriber Birth Date Member ID       ASHLEE GO 7/27/1925 [id number]           Secondary Coverage     Payor Plan Insurance Group Employer/Plan Group    [facility] [id number]     Payor Plan Address Payor Plan Phone Number Payor Plan Fax Number Effective Dates    [address] [address] [phone]  1/1/2010 - None Entered    [facility] [id number]       Subscriber Name Subscriber Birth Date Member ID       ASHLEE GO 7/27/1925 [id number]                 Emergency Contacts      (Rel.) Home Phone Work Phone Mobile Phone    OSVALDO GO (Daughter) [phone] -- --    Bella John (Daughter) [phone] -- --    BREONNATRACI (Daughter) -- -- [phone]    " ANNETTE CAM (Daughter) [phone] -- --            Emergency Contact Information     Name Relation Home Work Mobile    OSVALDO GO Daughter [phone]      Bella John Daughter [phone]      TRACI GO Daughter   [phone]    ANNETTE CAM Daughter [phone]            Insurance Information                MEDICARE/MEDICARE A & B Phone: [phone]    Subscriber: Ashlee Go Subscriber#: [id number]    Group#: -- Precert#: --        UM[facility] Phone: [phone]    Subscriber: Ashlee Go Subscriber#: [id number]    Group#: [id number] Precert#: --          Treatment Team     Provider Relationship Specialty Contact    Alvarez Steve DO Attending, Physician of Record Hospitalist   [phone]      Ariela Carrasco, PCT Patient Care Technician -- --    Angy Andujar RN Registered Nurse -- --    Giana Marx BSW     [phone]      Imani Verma, RRT Respiratory Therapist --   [phone]            Problem List           Codes Noted - Resolved       Hospital    * (Principal) Acute deep vein thrombosis (DVT) of femoral vein of left lower extremity (HCC) ICD-10-CM: I82.412  ICD-9-CM: 453.41 2022 - Present       Non-Hospital    COVID-19 ICD-10-CM: U07.1  ICD-9-CM: 079.89 2021 - Present    Pancreatitis ICD-10-CM: K85.90  ICD-9-CM: 577.0 2020 - Present        History & Physical      Mindy Lang PA-C at 22 7204     Attestation signed by Katharina Hernandez DO at 22 0608    I have reviewed this documentation and agree.                      [facility]  History & Physical    Patient Identification:  Name:  Ashlee Go  Age:  89 y.o.  Sex:  female  :  1925  MRN:  [id number]   Visit Number:  [id number]  Primary Care Physician:  Sharon Navarro APRN    Subjective     2022   Chief complaint:   Chief Complaint   Patient presents with   • Weakness - Generalized     History of presenting  illness:      Ashlee Go is a 89 y.o. female with past medical history significant for essential hypertension who presents to [facility] ED for evaluation of generalized weakness and bilateral lower extremity swelling.    The patient's daughter is present at bedside to provide majority of history. The patient is very hard of hearing and difficult to obtain a history from. The patient only minimally participates in exam and will occasionally only mumble a few words, but according to the daughter this is the patient's baseline. The family reported to the ED provider that patient has had swelling of her lower extremities with weeping of fluid for the past 2 to 3 weeks or possibly longer.  They did note that her left leg has been more swollen than her right leg and that her feet and legs become painful at night.  She was seen by her PCP who was concerned that the patient may have CHF. The patient's daughter reports that the patient was ambulatory in June and had a bad fall requiring her to get stitches on her face and since that time the patient has been nonambulatory using a wheelchair as she is afraid to walk. The patient has had no previous history of clots. She has no known cancers. She is currently not on anticoagulation at home. She has not had any history of internal bleeding. The daughter does state that the patient does have difficulty swallowing and sometimes has a difficult time getting the patient to take her medications despite crushing them and putting them in applesauce/pudding. The patient currently seems in no acute distress and resting comfortably in bed.     Upon arrival to the ED, vital signs were temperature 97.6, heart rate 120, respirations 18, blood pressure 117/91, SPO2 100% on room air.  CMP with glucose 106, CO2 15.3, anion gap 18.7, BUN 37, BUN/creatinine ratio 37.8, calcium 10.1, EGFR 52.6, albumin 3.06, otherwise unremarkable.  CBC with MCV 98.3, RDW 15.6, RDW-SD 56.2, otherwise  unremarkable.  Troponin T negative x1.  proBNP normal.  TSH 11.880.  Free T4 1.37.  CRP 0.75.  Magnesium 2.3.  COVID-19 and influenza A/B swab negative.  Chest x-ray with findings of COPD.  CT chest PE protocol with nonocclusive eccentric thrombus within both the right and left pulmonary arteries and could potentially represent chronic thrombus but exam positive for PE, findings of the abdomen includes left renal mass and nonspecific pneumobilia with numerous tiny nodular foci scattered within the lungs which are nonspecific in setting of known left renal mass, diffuse mosaic attenuation which may reflect small vessels or small airway disease.  BLE venous Doppler positive for DVT in left common femoral vein.  Bilateral arterial Doppler with monophasic waveforms and left posterior tibial artery but no occlusion.    Patient will be admitted to the telemetry floor for further evaluation and monitoring.     ---------------------------------------------------------------------------------------------------------------------   Review of Systems   Unable to perform ROS: Other (Majority of history is obtained from daughter as patient is very hard of hearing and does not participate much in exam)   Constitutional: Negative for chills and fever.   Cardiovascular: Positive for leg swelling (L > R ).   Musculoskeletal: Positive for gait problem (uses wheelchair).        Daughter reports patient complains of bilateral leg pain most commonly at night        ---------------------------------------------------------------------------------------------------------------------   Past Medical History:   Diagnosis Date   • Hip pain, acute, left    • Hypertension      No past surgical history on file.  No family history on file.  Social History     Socioeconomic History   • Marital status:    Tobacco Use   • Smoking status: Never   • Smokeless tobacco: Never   Substance and Sexual Activity   • Alcohol use: No   • Drug use: No   •  Sexual activity: Defer     ---------------------------------------------------------------------------------------------------------------------   Allergies:  Patient has no known allergies.  ---------------------------------------------------------------------------------------------------------------------   Home medications:    Medications below are reported home medications pulling from within the system; at this time, these medications have not been reconciled unless otherwise specified and are in the verification process for further verifcation as current home medications.  (Not in a hospital admission)      Hospital Scheduled Meds:     heparin, 12 Units/kg/hr, Last Rate: 12 Units/kg/hr (12/13/22 2021)        Current listed hospital scheduled medications may not yet reflect those currently placed in orders that are signed and held awaiting patient's arrival to floor.   ---------------------------------------------------------------------------------------------------------------------     Objective     Vital Signs:  Temp:  [97.6 °F (36.4 °C)] 97.6 °F (36.4 °C)  Heart Rate:  [] 96  Resp:  [18] 18  BP: (117-154)/(81-95) 138/87      12/13/22  1653   Weight: 36.3 kg (80 lb)     Body mass index is 13.73 kg/m².  ---------------------------------------------------------------------------------------------------------------------       Physical Exam  Constitutional:       General: She is awake.      Appearance: She is ill-appearing (chronically).      Comments: Resting in bed upon arrival. Appears in no acute distress. Very hard of hearing, hears best out of left ear.   HENT:      Head: Normocephalic and atraumatic.      Right Ear: External ear normal.      Left Ear: External ear normal.      Nose: Nose normal.      Mouth/Throat:      Mouth: Mucous membranes are dry.      Pharynx: Oropharynx is clear.   Eyes:      Extraocular Movements: Extraocular movements intact.      Conjunctiva/sclera: Conjunctivae normal.       Pupils: Pupils are equal, round, and reactive to light.   Cardiovascular:      Rate and Rhythm: Normal rate and regular rhythm.      Pulses: Normal pulses.      Heart sounds: Normal heart sounds. No murmur heard.    No friction rub. No gallop.      Comments: Difficult to detect pulses of lower extremities; Arterial US obtained in ED.   Pulmonary:      Effort: Pulmonary effort is normal. No respiratory distress.      Breath sounds: Normal breath sounds. No wheezing, rhonchi or rales.      Comments: Currently on room air saturating %.  Abdominal:      General: Abdomen is flat. Bowel sounds are normal. There is no distension.      Palpations: Abdomen is soft.      Tenderness: There is no abdominal tenderness. There is no guarding.   Musculoskeletal:         General: Normal range of motion.      Cervical back: Normal range of motion and neck supple.      Right lower leg: No edema.      Left lower le+ Edema present.   Skin:     General: Skin is warm and dry.      Findings: Wound (coccyx) present.          Neurological:      General: No focal deficit present.      Mental Status: She is alert. Mental status is at baseline.      Comments: Difficult to determine as patient is minimally participating in exam and will occasionally only mumble a few words.    Psychiatric:         Mood and Affect: Mood normal.         Behavior: Behavior normal. Behavior is cooperative.         Thought Content: Thought content normal.         ---------------------------------------------------------------------------------------------------------------------  EKG:    Pending cardiology interpretation.  Per my review, EKG shows normal sinus rhythm with heart rate 97 and  ms.        I have personally looked at both the EKG and the telemetry strips.  ---------------------------------------------------------------------------------------------------------------------   Results from last 7 days   Lab Units 22  4925   CRP  mg/dL 0.75*   WBC 10*3/mm3 8.51   HEMOGLOBIN g/dL 12.3   HEMATOCRIT % 40.9   MCV fL 98.3*   MCHC g/dL 30.1*   PLATELETS 10*3/mm3 335   INR  1.02         Results from last 7 days   Lab Units 12/13/22  1741   SODIUM mmol/L 138   POTASSIUM mmol/L 4.2   MAGNESIUM mg/dL 2.3   CHLORIDE mmol/L 104   CO2 mmol/L 15.3*   BUN mg/dL 37*   CREATININE mg/dL 0.98   CALCIUM mg/dL 10.1*   GLUCOSE mg/dL 106*   ALBUMIN g/dL 3.06*   BILIRUBIN mg/dL 0.3   ALK PHOS U/L 84   AST (SGOT) U/L 10   ALT (SGPT) U/L 5   Estimated Creatinine Clearance: 18.8 mL/min (by C-G formula based on SCr of 0.98 mg/dL).  No results found for: AMMONIA  Results from last 7 days   Lab Units 12/13/22 2006 12/13/22  1741   TROPONIN T ng/mL <0.010 <0.010     Results from last 7 days   Lab Units 12/13/22  1741   PROBNP pg/mL 1,335.0     Lab Results   Component Value Date    HGBA1C 5.50 09/04/2020     Lab Results   Component Value Date    TSH 11.880 (H) 12/13/2022    FREET4 1.37 12/13/2022     No results found for: PREGTESTUR, PREGSERUM, HCG, HCGQUANT  Pain Management Panel     Pain Management Panel Latest Ref Rng & Units 4/7/2022 9/4/2020    AMPHETAMINES SCREEN, URINE Negative Negative Negative    BARBITURATES SCREEN Negative Negative Negative    BENZODIAZEPINE SCREEN, URINE Negative Negative Negative    BUPRENORPHINEUR Negative Negative Negative    COCAINE SCREEN, URINE Negative Negative Negative    METHADONE SCREEN, URINE Negative Negative Negative    METHAMPHETAMINEUR Negative Negative -            ---------------------------------------------------------------------------------------------------------------------  Imaging Results (Last 7 Days)     Procedure Component Value Units Date/Time    CT Angiogram Chest Pulmonary Embolism [[id number]] Collected: 12/13/22 2143     Updated: 12/13/22 2145    Narrative:      CT CHEST PULMONARY EMBOLISM W CONTRAST    INDICATION:   Known left lower extremity DVT, evaluation for pulmonary embolus    TECHNIQUE:   CT angiogram of  the chest with IV contrast. 3-D reconstructions were obtained and reviewed.   Radiation dose reduction techniques included automated exposure control or exposure modulation based on body size. Count of known CT and cardiac nuc med studies  performed in previous 12 months: 6.     COMPARISON:   CT chest October 4, 2022, CT abdomen/pelvis November 16, 2022    FINDINGS:     Adequate opacification of the pulmonary arteries. There is nonocclusive of eccentric thrombus within both the right and the left pulmonary arteries. The distal segmental branches appear well-opacified. While age indeterminate, could potentially represent  chronic thrombus but the exam is positive for pulmonary embolus.    Normal heart size. No pericardial effusion.  No lymphadenopathy within the chest.    Upper abdomen demonstrates nonspecific pneumobilia. Neither kidney is adequately visualized but would favor presence of at least a left renal mass measuring up to 3.6 cm (better demonstrated on prior CT of November 17, 2022.    Pleural parenchymal scarring at the right greater than left lung apex. There are numerous tiny nodular foci scattered within the lungs, nonspecific in the setting of known left renal mass. The pattern is overall similar as that seen in October 4, 2022  however. There is some diffuse mosaic attenuation which may reflect small vessels or small airways disease.    No destructive bony lesion.      Impression:      1. There is nonocclusive eccentric thrombus within both the right and the left pulmonary arteries. The distal segmental branches appear well-opacified. While age indeterminate, this could potentially represent chronic thrombus but the exam is positive  for pulmonary embolus.  2. Findings in the upper abdomen better demonstrated on CT of November 17, 2022. This includes left renal mass and nonspecific pneumobilia.  3. There are numerous tiny nodular foci scattered within the lungs, nonspecific in the setting of known  left renal mass. The pattern is overall similar as that seen in October 4, 2022 however. There is some diffuse mosaic attenuation which may reflect  small vessels or small airways disease      Findings were discussed by phone conversation with Dr. Morales in the ER at 9:35 PM 12/13/2022.    Signer Name: DELMI DIAZ MD   Signed: 12/13/2022 9:43 PM   Workstation Name: [facility]    [facility]    US Arterial Doppler Lower Extremity Bilateral [[id number]] Collected: 12/13/22 1814     Updated: 12/13/22 1817    Narrative:      EXAMINATION: US ARTERIAL DOPPLER LOWER EXTREMITY  BILATERAL-      CLINICAL INDICATION: pain in legs at rest, swelling        COMPARISON: None available     PROCEDURE:  Color Doppler imaging with pulse Doppler waveform to evaluate lower  extremity arteries     FINDINGS: Any stenoses are evaluated using the NASCET or similar method.     There are biphasic and triphasic waveforms throughout with the exception  of the left posterior tibial artery where the waveforms are monophasic     No occlusive segments     No significant velocity elevation.       Impression:      Monophasic waveforms in the left posterior tibial artery but  no occlusion.      This report was finalized on 12/13/2022 6:15 PM by Dr. Gus Blue MD.       US Venous Doppler Lower Extremity Bilateral (duplex) [[id number]] Collected: 12/13/22 1759     Updated: 12/13/22 1811    Narrative:      US VENOUS DOPPLER LOWER EXTREMITY BILATERAL (DUPLEX)-     CLINICAL INDICATION: bilateral LE swelling, L>R, painful        COMPARISON: None available      TECHNIQUE: Color Doppler imaging was used with compression and  augmentation to evaluate the lower extremity deep venous system.     FINDINGS:   No evidence of deep vein thrombus in the right lower extremity.     Left lower extremity demonstrates internal clot and noncompressibility  in the left common femoral vein.       Impression:      1. Positive DVT in the left common  femoral vein.     This report was finalized on 12/13/2022 5:59 PM by Dr. Gus Blue MD.       XR Chest 1 View [[id number]] Collected: 12/13/22 1753     Updated: 12/13/22 1756    Narrative:      XR CHEST 1 VW-     CLINICAL INDICATION: peripheral edema        COMPARISON: 10/04/2022      TECHNIQUE: Single frontal view of the chest.     FINDINGS:      LUNGS: COPD      HEART AND MEDIASTINUM: Heart and mediastinal contours are unremarkable        SKELETON: Bony and soft tissue structures are unremarkable.             Impression:      COPD     This report was finalized on 12/13/2022 5:53 PM by Dr. Gus Blue MD.           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   -Continue heparin gtt and titrate per protocol.   -Pharmacy consulted to assist with pricing of NOAC's.   -Obtain a.m. TTE.   -Encourage incentive spirometer.     -Prerenal azotemia  -Anion-gap metabolic acidosis  -Likely due to poor oral intake.   -Continuous IV fluids ordered.   -Encourage oral intake as possible.   -Repeat a.m. CMP.     -Hypercalcemia (corrected Ca for albumin = 10.9)  -Likely due to poor oral intake.   -Continuous IV fluids.   -Check PTH.   -Repeat a.m. CMP.     -Subclinical hypothyroidism  -TSH 11.880, free T4 1.37; obtain free T3.  -Will start on Synthroid 25 mcg.   -Will need repeat thyroid studies in outpatient setting.     -Macrocytosis without anemia  -Obtain vitamin B12 and folate    -Left renal mass, 3.6 cm  -Noted on CT A/P 10/4/22 measuring  3.4 cm at that time.   -Continue outpatient follow-up and monitoring.     -Decubitus ulcer of coccyx  -Currently doesn't appear actively infected.   -Wound care consulted.   -Turn patient every 2 hours.     -Essential hypertension  -BP stable.   -Monitor per hospital protocol.   -Review home medications once reconciled.     -Reported dysphagia  -SLP consulted to determine diet modifications.   -Aspiration precautions.     -Malnutrition, BMI 13.73 kg/m²  -Daily multivitamin ordered.   -Nutrition consulted to perform malnutrition severity assessment.     -------------------------------  CODE STATUS: DNR/DNI; discussed with patient's daughter at bedside    High risk secondary to acute PE, acute versus chronic DVT  -------------------------------  Expected length of stay:  INPATIENT status due to the need for care which can only be reasonably provided in an hospital setting such as aggressive/expedited ancillary services and/or consultation services, the necessity for IV medications, close physician monitoring and/or the possible need for procedures.  In such, I feel patient's risk for adverse outcomes and need for care warrant INPATIENT evaluation and predict the patient's care encounter to likely last beyond 2 midnights.     Disposition: Pending clinical course    Mindy Lang PA-C  12/13/22  22:46 EST    ---------------------------------------------------------------------------------------------------------------------           Electronically signed by Katharina Hernandez DO at 12/14/22 0647       Lines, Drains & Airways     Active LDAs     Name Placement date Placement time Site Days    Peripheral IV 12/13/22 1832 Left Antecubital 12/13/22 1832  Antecubital  less than 1    Peripheral IV 12/13/22 2359 Posterior;Right Wrist 12/13/22  2359  Wrist  less than 1                  Current Facility-Administered Medications   Medication Dose Route Frequency Provider Last Rate Last Admin   • acetaminophen (TYLENOL)  tablet 650 mg  650 mg Oral Q6H PRN Katharina Hrenandez DO       • apixaban (ELIQUIS) tablet 10 mg  10 mg Oral Q12H Alvarez Steve,         Followed by   • [START ON 12/21/2022] apixaban (ELIQUIS) tablet 5 mg  5 mg Oral Q12H Alvarez Steve,        • guaifenesin (ROBITUSSIN) 100 MG/5ML liquid 200 mg  200 mg Oral 4x Daily PRN Katharina Hernandez DO       • HYDROcodone-acetaminophen (NORCO) 5-325 MG per tablet 1 tablet  1 tablet Oral Q8H PRN Katharina Hernandez DO       • [START ON 12/15/2022] levothyroxine (SYNTHROID, LEVOTHROID) tablet 25 mcg  25 mcg Oral Q AM Mindy Lang PA-C       • lidocaine (LIDODERM) 5 % 1 patch  1 patch Transdermal Q24H Katharina Hernandez DO   1 patch at 12/14/22 0938   • lisinopril (PRINIVIL,ZESTRIL) tablet 20 mg  20 mg Oral Daily Katharina Hernandez DO   20 mg at 12/14/22 0939   • magic barrier cream 1 application  1 application Topical 4x Daily PRN Mindy Lang PA-C       • megestrol (MEGACE) 40 MG/ML suspension 800 mg  800 mg Oral Daily Katharina Hernandez DO   800 mg at 12/14/22 0939   • multivitamin (THERAGRAN) tablet 1 tablet  1 tablet Oral Daily Mindy Lang PA-C   1 tablet at 12/14/22 0939   • nitroglycerin (NITROSTAT) SL tablet 0.4 mg  0.4 mg Sublingual Q5 Min PRN Katharina Hernandez DO       • pantoprazole (PROTONIX) EC tablet 40 mg  40 mg Oral QAM Katharina Hernandez DO       • Pharmacy Consult   Does not apply Continuous PRN Katharina Hernandez DO       • Pharmacy Consult   Does not apply Continuous PRN Mindy Lang PA-C       • polyethylene glycol (MIRALAX) packet 17 g  17 g Oral Daily Katharina Hernandez, DO       • sodium chloride 0.9 % flush 10 mL  10 mL Intravenous PRN Katharina Hernandez, DO       • sodium chloride 0.9 % flush 10 mL  10 mL Intravenous Q12H Katharina Hernandez, DO   10 mL at 12/14/22 0939   • sodium chloride 0.9 % flush 10 mL  10 mL Intravenous PRN Katharina Hernandez, DO       • sodium  chloride 0.9 % infusion 40 mL  40 mL Intravenous Katharina Cabello DO           Lab Results (last 24 hours)     Procedure Component Value Units Date/Time    aPTT [[id number]]  (Abnormal) Collected: 12/14/22 0812    Specimen: Blood Updated: 12/14/22 0846     PTT 57.5 seconds     Narrative:      PTT Heparin Therapeutic Range:  59 - 95 seconds      CBC & Differential [[id number]]  (Abnormal) Collected: 12/14/22 0552    Specimen: Blood Updated: 12/14/22 0612    Narrative:      The following orders were created for panel order CBC & Differential.  Procedure                               Abnormality         Status                     ---------                               -----------         ------                     CBC Auto Differential[[id number]]        Abnormal            Final result                 Please view results for these tests on the individual orders.    CBC Auto Differential [[id number]]  (Abnormal) Collected: 12/14/22 0552    Specimen: Blood Updated: 12/14/22 0612     WBC 8.59 10*3/mm3      RBC 3.45 10*6/mm3      Hemoglobin 10.2 g/dL      Hematocrit 32.8 %      MCV 95.1 fL      MCH 29.6 pg      MCHC 31.1 g/dL      RDW 15.5 %      RDW-SD 53.7 fl      MPV 9.9 fL      Platelets 289 10*3/mm3      Neutrophil % 71.3 %      Lymphocyte % 16.3 %      Monocyte % 6.3 %      Eosinophil % 5.4 %      Basophil % 0.5 %      Immature Grans % 0.2 %      Neutrophils, Absolute 6.13 10*3/mm3      Lymphocytes, Absolute 1.40 10*3/mm3      Monocytes, Absolute 0.54 10*3/mm3      Eosinophils, Absolute 0.46 10*3/mm3      Basophils, Absolute 0.04 10*3/mm3      Immature Grans, Absolute 0.02 10*3/mm3      nRBC 0.0 /100 WBC     aPTT [[id number]]  (Abnormal) Collected: 12/14/22 0217    Specimen: Blood Updated: 12/14/22 0249     PTT 67.8 seconds     Narrative:      PTT Heparin Therapeutic Range:  59 - 95 seconds      Comprehensive Metabolic Panel [[id number]]  (Abnormal) Collected: 12/14/22 0217    Specimen: Blood Updated: 12/14/22  0247     Glucose 120 mg/dL      BUN 37 mg/dL      Creatinine 0.89 mg/dL      Sodium 135 mmol/L      Potassium 3.7 mmol/L      Comment: Slight hemolysis detected by analyzer. Results may be affected.        Chloride 104 mmol/L      CO2 17.0 mmol/L      Calcium 9.7 mg/dL      Total Protein 6.1 g/dL      Albumin 2.75 g/dL      ALT (SGPT) 7 U/L      AST (SGOT) 11 U/L      Alkaline Phosphatase 78 U/L      Total Bilirubin 0.3 mg/dL      Globulin 3.4 gm/dL      A/G Ratio 0.8 g/dL      BUN/Creatinine Ratio 41.6     Anion Gap 14.0 mmol/L      eGFR 59.0 mL/min/1.73      Comment: National Kidney Foundation and American Society of Nephrology (ASN) Task Force recommended calculation based on the Chronic Kidney Disease Epidemiology Collaboration (CKD-EPI) equation refit without adjustment for race.       Narrative:      GFR Normal >60  Chronic Kidney Disease <60  Kidney Failure <15    The GFR formula is only valid for adults with stable renal function between ages 18 and 70.    Vitamin B12 [[id number]] Collected: 12/14/22 0217    Specimen: Blood Updated: 12/14/22 0222    Folate [[id number]] Collected: 12/14/22 0217    Specimen: Blood Updated: 12/14/22 0222    PTH, Intact [[id number]]  (Normal) Collected: 12/13/22 1741    Specimen: Blood from Arm, Left Updated: 12/14/22 0030     PTH, Intact 23.1 pg/mL     Narrative:      Please note the potential for biotin to falsely depress the PTH result when high levels of biotin surpassing the daily recommended dose are administered.    Results may be falsely decreased if patient taking Biotin.      T3, Free [[id number]] Collected: 12/13/22 2006    Specimen: Blood from Arm, Right Updated: 12/14/22 0016    Troponin [[id number]]  (Normal) Collected: 12/13/22 2006    Specimen: Blood from Arm, Right Updated: 12/13/22 2032     Troponin T <0.010 ng/mL     Narrative:      Troponin T Reference Range:  <= 0.03 ng/mL-   Negative for AMI  >0.03 ng/mL-     Abnormal for myocardial necrosis.  Clinicians would  have to utilize clinical acumen, EKG, Troponin and serial changes to determine if it is an Acute Myocardial Infarction or myocardial injury due to an underlying chronic condition.       Results may be falsely decreased if patient taking Biotin.      [place] Draw [[id number]] Collected: 12/13/22 1741    Specimen: Blood from Arm, Left Updated: 12/13/22 1845    Narrative:      The following orders were created for panel order [place] Draw.  Procedure                               Abnormality         Status                     ---------                               -----------         ------                     Green Top (Gel)[[id number]]                                  Final result               Lavender Top[[id number]]                                     Final result               Gold Top - SST[[id number]]                                   Final result               Light Blue Top[[id number]]                                   Final result                 Please view results for these tests on the individual orders.    Lavender Top [[id number]] Collected: 12/13/22 1741    Specimen: Blood from Arm, Left Updated: 12/13/22 1845     Extra Tube hold for add-on     Comment: Auto resulted       Green Top (Gel) [[id number]] Collected: 12/13/22 1741    Specimen: Blood from Arm, Left Updated: 12/13/22 1845     Extra Tube Hold for add-ons.     Comment: Auto resulted.       Gold Top - SST [[id number]] Collected: 12/13/22 1741    Specimen: Blood from Arm, Left Updated: 12/13/22 1845     Extra Tube Hold for add-ons.     Comment: Auto resulted.       Light Blue Top [[id number]] Collected: 12/13/22 1741    Specimen: Blood from Arm, Left Updated: 12/13/22 1845     Extra Tube Hold for add-ons.     Comment: Auto resulted       BNP [[id number]]  (Normal) Collected: 12/13/22 1741    Specimen: Blood from Arm, Left Updated: 12/13/22 1823     proBNP 1,335.0 pg/mL     Narrative:      Among patients with dyspnea, NT-proBNP is highly sensitive for the  detection of acute congestive heart failure. In addition NT-proBNP of <300 pg/ml effectively rules out acute congestive heart failure with 99% negative predictive value.      Troponin [[id number]]  (Normal) Collected: 12/13/22 1741    Specimen: Blood from Arm, Left Updated: 12/13/22 1823     Troponin T <0.010 ng/mL     Narrative:      Troponin T Reference Range:  <= 0.03 ng/mL-   Negative for AMI  >0.03 ng/mL-     Abnormal for myocardial necrosis.  Clinicians would have to utilize clinical acumen, EKG, Troponin and serial changes to determine if it is an Acute Myocardial Infarction or myocardial injury due to an underlying chronic condition.       Results may be falsely decreased if patient taking Biotin.      TSH [[id number]]  (Abnormal) Collected: 12/13/22 1741    Specimen: Blood from Arm, Left Updated: 12/13/22 1823     TSH 11.880 uIU/mL     T4, Free [[id number]]  (Normal) Collected: 12/13/22 1741    Specimen: Blood from Arm, Left Updated: 12/13/22 1823     Free T4 1.37 ng/dL     Narrative:      Results may be falsely increased if patient taking Biotin.      Comprehensive Metabolic Panel [[id number]]  (Abnormal) Collected: 12/13/22 1741    Specimen: Blood from Arm, Left Updated: 12/13/22 1819     Glucose 106 mg/dL      BUN 37 mg/dL      Creatinine 0.98 mg/dL      Sodium 138 mmol/L      Potassium 4.2 mmol/L      Comment: Slight hemolysis detected by analyzer. Results may be affected.        Chloride 104 mmol/L      CO2 15.3 mmol/L      Calcium 10.1 mg/dL      Total Protein 6.3 g/dL      Albumin 3.06 g/dL      ALT (SGPT) 5 U/L      AST (SGOT) 10 U/L      Alkaline Phosphatase 84 U/L      Total Bilirubin 0.3 mg/dL      Globulin 3.2 gm/dL      A/G Ratio 0.9 g/dL      BUN/Creatinine Ratio 37.8     Anion Gap 18.7 mmol/L      eGFR 52.6 mL/min/1.73      Comment: National Kidney Foundation and American Society of Nephrology (ASN) Task Force recommended calculation based on the Chronic Kidney Disease Epidemiology Collaboration  (CKD-EPI) equation refit without adjustment for race.       Narrative:      GFR Normal >60  Chronic Kidney Disease <60  Kidney Failure <15    The GFR formula is only valid for adults with stable renal function between ages 18 and 70.    C-reactive Protein [[id number]]  (Abnormal) Collected: 12/13/22 1741    Specimen: Blood from Arm, Left Updated: 12/13/22 1817     C-Reactive Protein 0.75 mg/dL     Magnesium [[id number]]  (Normal) Collected: 12/13/22 1741    Specimen: Blood from Arm, Left Updated: 12/13/22 1817     Magnesium 2.3 mg/dL     COVID PRE-OP / PRE-PROCEDURE SCREENING ORDER (NO ISOLATION) - Swab, Nasopharynx [[id number]]  (Normal) Collected: 12/13/22 1743    Specimen: Swab from Nasopharynx Updated: 12/13/22 1814    Narrative:      The following orders were created for panel order COVID PRE-OP / PRE-PROCEDURE SCREENING ORDER (NO ISOLATION) - Swab, Nasopharynx.  Procedure                               Abnormality         Status                     ---------                               -----------         ------                     COVID-19 and FLU A/B PCR...[[id number]]  Normal              Final result                 Please view results for these tests on the individual orders.    COVID-19 and FLU A/B PCR - Swab, Nasopharynx [[id number]]  (Normal) Collected: 12/13/22 1743    Specimen: Swab from Nasopharynx Updated: 12/13/22 1814     COVID19 Not Detected     Influenza A PCR Not Detected     Influenza B PCR Not Detected    Narrative:      Fact sheet for providers: https://www.fda.gov/media/128997/download    Fact sheet for patients: https://www.fda.gov/media/460147/download    Test performed by PCR.    Protime-INR [[id number]]  (Normal) Collected: 12/13/22 1741    Specimen: Blood from Arm, Left Updated: 12/13/22 1800     Protime 13.7 Seconds      INR 1.02    Narrative:      Suggested INR therapeutic range for stable oral anticoagulant therapy:    Low Intensity therapy:   1.5-2.0  Moderate Intensity therapy:    2.0-3.0  High Intensity therapy:   2.5-4.0    aPTT [[id number]]  (Abnormal) Collected: 12/13/22 1741    Specimen: Blood from Arm, Left Updated: 12/13/22 1800     PTT 20.0 seconds     Narrative:      PTT Heparin Therapeutic Range:  59 - 95 seconds      CBC & Differential [[id number]]  (Abnormal) Collected: 12/13/22 1741    Specimen: Blood from Arm, Left Updated: 12/13/22 1750    Narrative:      The following orders were created for panel order CBC & Differential.  Procedure                               Abnormality         Status                     ---------                               -----------         ------                     CBC Auto Differential[[id number]]        Abnormal            Final result                 Please view results for these tests on the individual orders.    CBC Auto Differential [[id number]]  (Abnormal) Collected: 12/13/22 1741    Specimen: Blood from Arm, Left Updated: 12/13/22 1750     WBC 8.51 10*3/mm3      RBC 4.16 10*6/mm3      Hemoglobin 12.3 g/dL      Hematocrit 40.9 %      MCV 98.3 fL      MCH 29.6 pg      MCHC 30.1 g/dL      RDW 15.6 %      RDW-SD 56.2 fl      MPV 9.9 fL      Platelets 335 10*3/mm3      Neutrophil % 74.9 %      Lymphocyte % 15.5 %      Monocyte % 4.6 %      Eosinophil % 4.0 %      Basophil % 0.4 %      Immature Grans % 0.6 %      Neutrophils, Absolute 6.38 10*3/mm3      Lymphocytes, Absolute 1.32 10*3/mm3      Monocytes, Absolute 0.39 10*3/mm3      Eosinophils, Absolute 0.34 10*3/mm3      Basophils, Absolute 0.03 10*3/mm3      Immature Grans, Absolute 0.05 10*3/mm3      nRBC 0.0 /100 WBC         Orders (last 24 hrs)      Start     Ordered    12/21/22 0900  apixaban (ELIQUIS) tablet 5 mg  Every 12 Hours Scheduled        See [place] for full Linked Orders Report.    12/14/22 1254    12/21/22 0000  apixaban (ELIQUIS) 5 MG tablet tablet  Every 12 Hours Scheduled         12/14/22 1258    12/15/22 0600  levothyroxine (SYNTHROID, LEVOTHROID) tablet 25 mcg  Every Early  Morning         12/14/22 0012    12/15/22 0600  aPTT  Morning Draw         12/14/22 0855    12/15/22 0000  levothyroxine (SYNTHROID, LEVOTHROID) 25 MCG tablet  Every Early Morning         12/14/22 1258    12/15/22 0000  multivitamin (THERAGRAN) tablet tablet  Daily         12/14/22 1258    12/14/22 1400  apixaban (ELIQUIS) tablet 10 mg  Every 12 Hours Scheduled        See [place] for full Linked Orders Report.    12/14/22 1254    12/14/22 1256  Discharge patient  Once         12/14/22 1258    12/14/22 1041  Diet: Regular/House Diet; Feeding Assistance - Nursing; Texture: Pureed (NDD 1); Fluid Consistency: Thin (IDDSI 0)  Diet Effective Now         12/14/22 1041    12/14/22 1041  Aspiration Precautions  Continuous         12/14/22 1041    12/14/22 1041  Reflux Precautions  Continuous         12/14/22 1041    12/14/22 1041  Oral Care  Once         12/14/22 1041    12/14/22 1041  Diet Instructions To Nursing  Until Discontinued        Comments: Medications crushed in puree. 1:1 assistance with all po intake.    12/14/22 1041    12/14/22 0900  lidocaine (LIDODERM) 5 % 1 patch  Every 24 Hours Scheduled         12/14/22 0302    12/14/22 0900  lisinopril (PRINIVIL,ZESTRIL) tablet 20 mg  Daily         12/14/22 0302    12/14/22 0900  megestrol (MEGACE) 40 MG/ML suspension 800 mg  Daily         12/14/22 0302    12/14/22 0900  polyethylene glycol (MIRALAX) packet 17 g  Daily         12/14/22 0302    12/14/22 0900  multivitamin (THERAGRAN) tablet 1 tablet  Daily         12/14/22 0012    12/14/22 0851  FL Video Swallow Single Contrast  1 Time Imaging         12/14/22 0850    12/14/22 0821  Adult Transthoracic Echo Complete W/ Cont if Necessary Per Protocol  Once         12/14/22 0821    12/14/22 0800  Oral Care  2 Times Daily       12/13/22 2323    12/14/22 0800  aPTT  Timed         12/14/22 0254    12/14/22 0700  pantoprazole (PROTONIX) EC tablet 40 mg  Every Morning         12/14/22 0302    12/14/22 0605  magic barrier  cream 1 application  4 Times Daily PRN         12/14/22 0605    12/14/22 0600  CBC & Differential  Every Third Day,   Status:  Canceled      Comments: Discontinue After Heparin Stopped      12/13/22 2006 12/14/22 0600  CBC Auto Differential  PROCEDURE ONCE        Comments: Discontinue After Heparin Stopped      12/13/22 2205 12/14/22 0600  CBC & Differential  Morning Draw,   Status:  Canceled         12/13/22 2323 12/14/22 0600  Comprehensive Metabolic Panel  Morning Draw         12/13/22 2323 12/14/22 0600  Incentive Spirometry  Every 4 Hours While Awake       12/14/22 0012    12/14/22 0600  Vitamin B12  Morning Draw         12/14/22 0017    12/14/22 0600  Folate  Morning Draw         12/14/22 0017 12/14/22 0301  HYDROcodone-acetaminophen (NORCO) 5-325 MG per tablet 1 tablet  Every 8 Hours PRN         12/14/22 0302    12/14/22 0301  guaifenesin (ROBITUSSIN) 100 MG/5ML liquid 200 mg  4 Times Daily PRN         12/14/22 0302    12/14/22 0221  aPTT  Timed         12/13/22 2028 12/14/22 0121  Stage II Pressure Ulcer Care - Twice Daily  Every 12 Hours        Comments: - Gently Cleanse With Normal Saline  - Apply Skin Protective Barrier Cream Every 12 Hours    12/14/22 0120    12/14/22 0121  Stage II Pressure Ulcer Care - As Needed  Per Order Details        Comments: - Gently Cleanse With Normal Saline  - Apply Skin Protective Barrier Cream As Needed After Cleansing    12/14/22 0120    12/14/22 0044  Pharmacy Consult  Continuous PRN         12/14/22 0045    12/14/22 0030  Inpatient Spiritual Care Consult  Once        Provider:  (Not yet assigned)    12/14/22 0030    12/14/22 0029  Inpatient Case Management  Consult  Once        Comments: When asking family on rather she could stay with someone when discharged, they replied that she would just go back to living at home alone and people could just check on her like they have been. Pt has bed sores   Provider:  (Not yet assigned)    12/14/22  0030    12/14/22 0015  sodium chloride 0.9 % flush 10 mL  Every 12 Hours Scheduled         12/13/22 2323    12/14/22 0015  dextrose (D5W) 5 % infusion  Continuous         12/13/22 2323 12/14/22 0013  Turn Patient  Now Then Every 2 Hours         12/14/22 0012    12/14/22 0013  SLP Consult: Eval & Treat Other; Reported dysphagia  Once         12/14/22 0012    12/14/22 0013  Nutrition Consult  Once        Provider:  (Not yet assigned)    12/14/22 0012    12/14/22 0012  PTH, Intact  Once         12/14/22 0012    12/14/22 0012  T3, Free  Once         12/14/22 0012    12/14/22 0012  Wound Ostomy Eval & Treat  Once         12/14/22 0012    12/14/22 0000  Vital Signs  Every 4 Hours      Comments: Per per hospital policy    12/13/22 2323 12/14/22 0000  apixaban (ELIQUIS) 5 MG tablet tablet  Every 12 Hours Scheduled         12/14/22 1258    12/14/22 0000  Ambulatory Referral to Home Health (Hospital)         12/14/22 1258    12/14/22 0000  Discharge Follow-up with PCP         12/14/22 1258    12/14/22 0000  Hospital Bed         12/14/22 1258    12/13/22 2324  Notify Physician (with Parameters)  Until Discontinued         12/13/22 2323 12/13/22 2324  Intake & Output  Every Shift       12/13/22 2323 12/13/22 2324  Weigh patient  Once         12/13/22 2323 12/13/22 2324  Oxygen Therapy- Nasal Cannula; Titrate for SPO2: 90% - 95%  Continuous         12/13/22 2323 12/13/22 2324  Insert Peripheral IV  Once         12/13/22 2323 12/13/22 2324  Saline Lock & Maintain IV Access  Continuous,   Status:  Canceled         12/13/22 2323 12/13/22 2324  VTE Prophylaxis Not Indicated: Other: Patient Currently Anticoagulated / Receiving Prophylaxis  Once         12/13/22 2323 12/13/22 2324  Telemetry - Maintain IV Access  Continuous         12/13/22 2323 12/13/22 2324  Continuous Cardiac Monitoring  Continuous        Comments: Follow Standing Orders As Outlined in Process Instructions (Open Order Report to View  Full Instructions)    12/13/22 2323 12/13/22 2324  May Be Off Telemetry for Tests  Continuous         12/13/22 2323 12/13/22 2324  Notify Provider if ACLS Protocol Activated  Until Discontinued         12/13/22 2323 12/13/22 2324  Turn Patient  Now Then Every 2 Hours         12/13/22 2323 12/13/22 2324  Diet: Regular/House Diet; Texture: Regular Texture (IDDSI 7); Fluid Consistency: Thin (IDDSI 0)  Diet Effective Now,   Status:  Canceled         12/13/22 2323 12/13/22 2324  Adult Transthoracic Echo Complete W/ Cont if Necessary Per Protocol  Once,   Status:  Canceled         12/13/22 2323 12/13/22 2323  sodium chloride 0.9 % flush 10 mL  As Needed         12/13/22 2323 12/13/22 2323  sodium chloride 0.9 % infusion 40 mL  As Needed         12/13/22 2323 12/13/22 2323  nitroglycerin (NITROSTAT) SL tablet 0.4 mg  Every 5 Minutes PRN         12/13/22 2323 12/13/22 2323  acetaminophen (TYLENOL) tablet 650 mg  Every 6 Hours PRN         12/13/22 2323 12/13/22 2323  Pharmacy Consult  Continuous PRN         12/13/22 2323 12/13/22 2211  Inpatient Admission  Once         12/13/22 2212 12/13/22 2211  Code Status and Medical Interventions:  Continuous         12/13/22 2212 12/13/22 2125  iopamidol (ISOVUE-370) 76 % injection 100 mL  Once in Imaging         12/13/22 2123 12/13/22 2008  heparin (porcine) 5000 UNIT/ML injection 2,200 Units  Once         12/13/22 2006 12/13/22 2007  heparin 05179 units/250 mL (100 units/mL) in 0.45 % NaCl infusion  Titrated,   Status:  Discontinued         12/13/22 2006 12/13/22 2006  Initiate & Follow Heparin Protocol  Continuous,   Status:  Canceled         12/13/22 2006 12/13/22 2006  Heparin Nomogram / Protocol Adjustment  Once,   Status:  Canceled         12/13/22 2006 12/13/22 2006  Notify Provider Platelet Count Less Than 20399  Until Discontinued,   Status:  Canceled         12/13/22 2006 12/13/22 2006  Notify Provider if PTT Not in  Therapeutic Range After 24 Hours  Until Discontinued,   Status:  Canceled         12/13/22 2006 12/13/22 2006  Stop Infusion & Notify Provider if Bleeding Occurs  Until Discontinued,   Status:  Canceled         12/13/22 2006 12/13/22 2005  RN to Place STAT aPTT Order 6 Hours After Heparin Bolus & 6 Hours After Any Heparin Rate Change  As Needed,   Status:  Canceled       12/13/22 2006 12/13/22 2005  After 2 Consecutive Therapeutic aPTTs, Obtain aPTT Daily.  If a Rate Adjustment is Necessary, Resume Every 6 Hour aPTT Draws  As Needed,   Status:  Canceled       12/13/22 2006 12/13/22 2005  heparin (porcine) 5000 UNIT/ML injection 2,200 Units  As Needed,   Status:  Discontinued         12/13/22 2006 12/13/22 2005  heparin (porcine) 5000 UNIT/ML injection 1,100 Units  As Needed,   Status:  Discontinued         12/13/22 2006 12/13/22 1836  sodium chloride 0.9 % bolus 500 mL  Once         12/13/22 1834    12/13/22 1835  CT Angiogram Chest Pulmonary Embolism  1 Time Imaging         12/13/22 1834    12/13/22 1730  US Arterial Doppler Lower Extremity Bilateral  1 Time Imaging         12/13/22 1729    12/13/22 1729  US Venous Doppler Lower Extremity Bilateral (duplex)  1 Time Imaging         12/13/22 1729    12/13/22 1720  XR Chest 1 View  1 Time Imaging         12/13/22 1719    12/13/22 1720  Monitor Blood Pressure  Per Hospital Policy         12/13/22 1719    12/13/22 1720  Cardiac Monitoring  Continuous,   Status:  Canceled        Comments: Follow Standing Orders As Outlined in Process Instructions (Open Order Report to View Full Instructions)    12/13/22 1719    12/13/22 1720  Pulse Oximetry, Continuous  Continuous         12/13/22 1719    12/13/22 1720  Fall Precautions  Continuous         12/13/22 1719    12/13/22 1720  Insert Peripheral IV  Once        See [facility] for full Linked Orders Report.    12/13/22 1719    12/13/22 1719  sodium chloride 0.9 % flush 10 mL  As Needed        See [place] for  full Linked Orders Report.    12/13/22 1719    12/13/22 1719  COVID PRE-OP / PRE-PROCEDURE SCREENING ORDER (NO ISOLATION) - Swab, Nasopharynx  Once         12/13/22 1719    12/13/22 1719  CBC & Differential  Once         12/13/22 1719    12/13/22 1719  Comprehensive Metabolic Panel  Once         12/13/22 1719    12/13/22 1719  Protime-INR  Once         12/13/22 1719    12/13/22 1719  aPTT  Once         12/13/22 1719    12/13/22 1719  Urinalysis With Microscopic If Indicated (No Culture) - Urine, Clean Catch  Once         12/13/22 1719    12/13/22 1719  [place] Draw  Once         12/13/22 1719    12/13/22 1719  BNP  Once         12/13/22 1719    12/13/22 1719  Troponin  Now Then Every 2 Hours       12/13/22 1719    12/13/22 1719  C-reactive Protein  Once         12/13/22 1719    12/13/22 1719  TSH  Once         12/13/22 1719    12/13/22 1719  T4, Free  Once         12/13/22 1719    12/13/22 1719  Magnesium  Once         12/13/22 1719    12/13/22 1719  COVID-19 and FLU A/B PCR - Swab, Nasopharynx  PROCEDURE ONCE         12/13/22 1719    12/13/22 1719  CBC Auto Differential  PROCEDURE ONCE         12/13/22 1719    12/13/22 1719  Green Top (Gel)  PROCEDURE ONCE         12/13/22 1719    12/13/22 1719  Lavender Top  PROCEDURE ONCE         12/13/22 1719    12/13/22 1719  Gold Top - SST  PROCEDURE ONCE         12/13/22 1719    12/13/22 1719  Light Blue Top  PROCEDURE ONCE         12/13/22 1719    12/13/22 1711  ECG 12 Lead Tachycardia  Once         12/13/22 1710    Unscheduled  Telemetry - Pulse Oximetry  Continuous PRN      Comments: If Patient Develops Unresponsiveness, Acute Dyspnea, Cyanosis or Suspected Hypoxemia Start Continuous Pulse Ox Monitoring, Apply Oxygen & Notify Provider    12/13/22 2323    Unscheduled  Oxygen Therapy- Nasal Cannula; Titrate for SPO2: 90% - 95%  Continuous PRN      Comments: If Patient Develops Unresponsiveness, Acute Dyspnea, Cyanosis or Suspected Hypoxemia Start Continuous Pulse Ox  Monitoring, Apply Oxygen & Notify Provider    12/13/22 2323    Unscheduled  ECG 12 Lead Other; Acute Chest Pain or Dysrhythmia  As Needed      Comments: Nurse to Release if Patient Expericences Acute Chest Pain or Dysrhythmias    12/13/22 2323    Unscheduled  Potassium  As Needed      Comments: For Ventricular Arrhythmias      12/13/22 2323    Unscheduled  Magnesium  As Needed      Comments: For Ventricular Arrhythmias      12/13/22 2323    Unscheduled  Troponin  As Needed      Comments: For Chest Pain      12/13/22 2323    Unscheduled  Blood Gas, Arterial -With Co-Ox Panel: Yes  As Needed      Comments: Draw for Acute Dyspnea, Cyanosis, Suspected Hypoxemia or UnresponsivenessNotify Provider of Results      12/13/22 2323    Unscheduled  Initiate ACLS Protocol For Life Threatening Dysrhythmias (If Appropriate for Patient)  As Needed       12/13/22 2323    --  lisinopril (PRINIVIL,ZESTRIL) 20 MG tablet  Daily         12/13/22 1957    --  amoxicillin (AMOXIL) 500 MG capsule  2 Times Daily,   Status:  Discontinued         12/13/22 1957    --  benzonatate (TESSALON) 100 MG capsule  3 Times Daily PRN,   Status:  Discontinued         12/13/22 1957    --  HYDROC APAP 5/325MG /5ML  Status:  Discontinued         12/13/22 1957    --  ondansetron ODT (ZOFRAN-ODT) 4 MG disintegrating tablet  Every 8 Hours PRN         12/13/22 1957    --  HM LIDOCAINE PATCH EX  Status:  Discontinued         12/13/22 1957    --  mupirocin (BACTROBAN) 2 % ointment  2 Times Daily,   Status:  Discontinued         12/13/22 1957    --  cefdinir (OMNICEF) 300 MG capsule  2 Times Daily,   Status:  Discontinued         12/13/22 1957    --  guaifenesin (ROBITUSSIN) 100 MG/5ML liquid  4 Times Daily PRN         12/13/22 1957    --  omeprazole (priLOSEC) 20 MG capsule  Daily         12/13/22 1957    --  Lidocaine 4 % patch  Every 12 Hours         12/13/22 2222    --  HYDROcodone-acetaminophen (NORCO) 5-325 MG per tablet  Every 8 Hours PRN         12/13/22      --  polyethylene glycol (MIRALAX) 17 g packet  Daily         228                Operative/Procedure Notes (last 24 hours)  Notes from 22 1303 through 22 1303   No notes of this type exist for this encounter.         Physician Progress Notes (last 24 hours)  Notes from 22 1303 through 22 1303   No notes of this type exist for this encounter.         Consult Notes (last 24 hours)  Notes from 22 1303 through 22 1303   No notes of this type exist for this encounter.         Physical Therapy Notes (most recent note)    No notes exist for this encounter.         Occupational Therapy Notes (most recent note)    No notes exist for this encounter.         ADL Documentation (last day)     Date/Time Transferring Toileting Bathing Dressing Eating Communication Swallowing Change in Functional Status Since Onset of Current Illness/Injury Equipment Currently Used at Home    22 1125 -- -- -- -- -- -- -- -- hospital bed;walker, rolling;wheelchair;shower chair    22 1045 4 - completely dependent 4 - completely dependent 4 - completely dependent 4 - completely dependent 4 - completely dependent 2 - difficulty understanding and speaking (not related to language barrier) 2 - difficulty swallowing liquids/foods -- --    22 0101 4 - completely dependent 3 - assistive equipment and person 4 - completely dependent 4 - completely dependent 4 - completely dependent 2 - difficulty speaking (not related to language barrier) 2 - difficulty swallowing foods -- --    22 2329 -- -- -- -- -- -- -- yes bp cuff;walker, standard;walker, rolling            Discharge Summary    No notes of this type exist for this encounter.  [address] [address]  Phone:  [phone]  Fax:  [phone] Date: Dec 14, 2022      Ambulatory Referral to Home Health (Hospital)     Patient:  Ashlee Go MRN:  [id number]   [facility] [place] [address] :   7/27/1925  N:    Phone: [phone] Sex:  F      INSURANCE PAYOR PLAN GROUP # SUBSCRIBER ID   Primary:  Secondary:    MEDICARE  R [id number]  [id number]  [id number]      Referring Provider Information:  ALVAREZ STEVE Phone: [phone] Fax: [phone]       Referral Information:   # Visits:  999 Referral Type: Home Health [42]   Urgency:  Routine Referral Reason: Specialty Services Required   Start Date: Dec 14, 2022 End Date:  To be determined by Insurer   Diagnosis: Debility (R53.81 [ICD-10-CM] 799.3 [ICD-9-CM])      Refer to Dept:   Refer to Provider:   Refer to Provider Phone:   Refer to Facility:       Face to Face Visit Date: 12/14/2022  Follow-up provider for Plan of Care? I treated the patient in an acute care facility and will not continue treatment after discharge.  Follow-up provider: MIKE MARTINEZ [[id number]]  Reason/Clinical Findings: bilateral PEs, DVT  Describe mobility limitations that make leaving home difficult: a/c debility, advanced age, PEs  Nursing/Therapeutic Services Requested: Physical Therapy  Nursing/Therapeutic Services Requested: Occupational Therapy  PT orders: Therapeutic exercise  PT orders: Total joint pathway  PT orders: Strengthening  Occupational orders: Activities of daily living  Occupational orders: Energy conservation  Occupational orders: Strengthening  Frequency: 1 Week 1     This document serves as a request of services and does not constitute Insurance authorization or approval of services.  To determine eligibility, please contact the members Insurance carrier to verify and review coverage.     If you have medical questions regarding this request for services. Please contact [address] at [phone] during normal business hours.        Authorizing Provider:Alvarez Steve DO  Authorizing Provider's NPI: [id number]  Order Entered By: Alvarez Steve DO 12/14/2022 12:58 PM      Electronically signed by: Alvarez Steve DO 12/14/2022 12:58 PM            Discharge Order (From admission, onward)     Start     Ordered    12/14/22 1256  Discharge patient  Once        Expected Discharge Date: 12/14/22    Expected Discharge Time: Midday    Discharge Disposition: Home or Self Care    Physician of Record for Attribution - Please select from Treatment Team: ALVAREZ STEVE [[id number]]    Review needed by CMO to determine Physician of Record: No       Question Answer Comment   Physician of Record for Attribution - Please select from Treatment Team ALVAREZ STEVE    Review needed by CMO to determine Physician of Record No        12/14/22 1250

## 2022-12-14 NOTE — CASE MANAGEMENT/SOCIAL WORK
Discharge Planning Assessment   [place]     Patient Name: Ashlee Go  MRN: [id number]  Today's Date: 12/14/2022    Admit Date: 12/13/2022    Plan: Pt admitted on 12/13/22.  SS received consult per ns for discharge planning.  SS spoke with pt and pt's daughter Daniela at bedside. Pt lives at home alone with family staying with pt 24 hours a day assisting with care at address [address] [facility] and plans to return home at discharge.  Pt has been bedbound since May 2023 per daughter.  Pt currently does not utilize home health services.  Pt family requests home health services with [facility] as preference.  Pt currently has available hospital bed, rolling walker, wheelchair and shower chair via [facility].  Pt daughter requests gel mattress or alternating pressure pad and pump at discharge.  Pt utilizes [facility] in [facility].  Pt's PCP is Sharon Navarro.  Pt transports via EMS.  SS will follow and assist with discharge disposition.   Discharge Needs Assessment     Row Name 12/14/22 1125       Living Environment    People in Home alone    Unique Family Situation Lives alone with daughters taking turns staying with pt at home.    Current Living Arrangements home    Primary Care Provided by child(esme)    Provides Primary Care For no one, unable/limited ability to care for self    Family Caregiver if Needed child(esme), adult    Quality of Family Relationships helpful;involved;supportive    Able to Return to Prior Arrangements yes       Transition Planning    Patient/Family Anticipates Transition to home with family    Transportation Anticipated public transportation       Discharge Needs Assessment    Equipment Currently Used at Home hospital bed;walker, rolling;wheelchair;shower chair               Discharge Plan     Row Name 12/14/22 1126       Plan    Plan Pt admitted on 12/13/22.  SS received consult per ns for discharge planning.  SS spoke with pt and pt's daughter Daniela at bedside. Pt lives  at home alone with family staying with pt 24 hours a day assisting with care at address [address] and plans to return home at discharge.  Pt has been bedbound since May 2023 per daughter.  Pt currently does not utilize home health services.  Pt family requests home health services with [facility] as preference.  Pt currently has available hospital bed, rolling walker, wheelchair and shower chair via [facility].  Pt daughter requests gel mattress or alternating pressure pad and pump at discharge.  Pt utilizes [facility] in [facility].  Pt's PCP is Sharon Navarro.  Pt transports via EMS.  SS will follow and assist with discharge disposition.                     Expected Discharge Date and Time     Expected Discharge Date Expected Discharge Time    Dec 16, 2022          Demographic Summary     Row Name 12/14/22 1124       General Information    Admission Type inpatient    Referral Source nursing    Reason for Consult discharge planning    Preferred Language English                  Giana Marx, BSW

## 2022-12-14 NOTE — THERAPY EVALUATION
"Acute Care - Speech Language Pathology   Swallow Initial Evaluation [facility]   Clinical Dysphagia Assessment     Patient Name: Ashlee Go  : 1925  MRN: [id number]  Today's Date: 2022             Admit Date: 2022    Ashlee Go  is seen at bedside this AM on PCU to assess safety/efficacy of swallowing fnx, determine safest/least restrictive diet tolerance. Her daughter was present at bedside for this assessment.    Ms. Go presented to [facility] ED 22 from her private home 2/2 persistent lower extremity edema with weeping, per family report, for 2-3 weeks prior, progressive weakness. Imaging in ED revealed (per radiologist) COPD, pulmonary embolus. She was admitted for further management.     PMH is significant for pancreatitis, COVID 19, HTN, [place], s/p fall in  of this year to result in non-ambulatory status since that time, advanced dementia.     She is referred to rule out dysphagia. She is reported with modified po diet of \"soft foods, finely cut meats, thin liquids, medicines in pudding.\"     Social History     Socioeconomic History   • Marital status:    Tobacco Use   • Smoking status: Never   • Smokeless tobacco: Current     Types: Snuff   Substance and Sexual Activity   • Alcohol use: No   • Drug use: No   • Sexual activity: Defer      Diet Orders (active) (From admission, onward)     Start     Ordered    22 1041  Diet: Regular/House Diet; Feeding Assistance - Nursing; Texture: Pureed (NDD 1); Fluid Consistency: Thin (IDDSI 0)  Diet Effective Now         22 1041              She was observed on ra w/o complications.     Ms. Go is positioned upright and centered in bed to accept multiple po presentations of ice chips, solid cracker, puree, and thin liquids via spoon only. She did not attempt to self provide trials per significant weakness, ams.      Facial/oral structures are symmetrical upon observation. Lingual protrusion reveals no deviation. Oral " mucosa are moderately xerostomic, pink, and clean. Secretions are clear, tacky, and controlled. OROM/ARTURO is moderately-severely weak without attempts to imitate oral postures. Gag is not assessed. Volitional cough CNA as could not elicit.  Voice is severely weak in intensity, clear in quality w/ intelligible speech with very limited verbal responses to direct interactions. She is cooperative. She declines any significant desire for po intake at time, however, she does accept po presentations for this assessment.     Upon po presentations, poor bolus anticipation and acceptance w/ weak, but incomplete labial seal for partial bolus clearance via spoon bowl. Bolus formation, manipulation and control are moderately-severely weak with delayed lingual initiation, minimal bolus manipulation. Delayed transit of bolus with piecemeal deglutition of puree. No significant oral residue. No overt s/s aspiration across this limited assessment.      Pharyngeal swallow is timely w/ adequate hyolaryngeal elevation per palpation. No overt s/s aspiration evidenced across this evaluation. No silent aspiration suspected.    Visit Dx:     ICD-10-CM ICD-9-CM   1. Acute deep vein thrombosis (DVT) of femoral vein of left lower extremity ([facility])  I82.412 453.41   2. Other pulmonary embolism without acute cor pulmonale, unspecified chronicity ([facility])  I26.99 415.19     Patient Active Problem List   Diagnosis   • Pancreatitis   • COVID-19   • Acute deep vein thrombosis (DVT) of femoral vein of left lower extremity ([facility])     Past Medical History:   Diagnosis Date   • Hip pain, acute, left    • Hypertension      Past Surgical History:   Procedure Laterality Date   • BILE DUCT STENT PLACEMENT       EDUCATION  The patient has been educated in the following areas:   Dysphagia (Swallowing Impairment) Oral Care/Hydration Modified Diet Instruction.     Impression: Ms. Go presents with weakness, fatigue, ams, limited desire for po intake. Moderate oral  "dysphagia with  incomplete labial seal for partial bolus clearance via spoon bowl. Bolus formation, manipulation and control are moderately-severely weak with delayed lingual initiation, minimal bolus manipulation. Delayed transit of bolus with piecemeal deglutition of puree. No significant oral residue. No overt s/s aspiration across this limited assessment. Pharyngeal swallow is timely w/ adequate hyolaryngeal elevation per palpation. No overt s/s aspiration evidenced across this evaluation. No silent aspiration suspected per this limited assessment.     Ms. Go is felt to most benefit from modified po diet of puree consistencies, thin liquids. Medications crushed in puree. Universal aspiration precautions, LEIDY precautions, oral care protocol. 1:1 assistance with all po intake.     Her daughter that is present at bedside reports that the \"doctor came in earlier and said that she can go home so that she won't get anything worse from being here.\" She further states that family plans for her to return home with direct care. I did educate family on preparation of puree consistencies, crushed medications with verbal agreement.     SLP Recommendation and Plan    1. Puree diet, thin liquids.    2. Medications crushed in puree/thins.   3. Upright and centered for all po intake with 1:1 assistance with all po intake.   4. LEIDY precautions.  5. Oral care protocol.    D/w RN results and recommendations w/ verbal agreement.    Thank you for allowing me to participate in the care of your patient-  Latoya Henning M.S., CCC/SLP                                                                                                   Time Calculation:       Therapy Charges for Today     Code Description Service Date Service Provider Modifiers Qty    [id number]  ST EVAL ORAL PHARYNG SWALLOW 4 12/14/2022 Latoya Henning, MS CCC-SLP GN 1               Latoya Henning MS CCC-SLP  12/14/2022  "

## 2022-12-14 NOTE — ED PROVIDER NOTES
Subjective   History of Present Illness  89-year-old female who presents to the ED today with her family for bilateral lower extremity swelling.  They report that she has had swelling of her legs with weeping of fluid for the last 2 or 3 weeks, possibly longer.  They report that her PCP is concerned that she may have CHF.  They do report that her left leg has been more swollen than her right leg.  She also complains of her feet and legs being painful at night.  The patient is currently moaning in the family reports it is because her bottom is hurting from some pressure wounds.  They report that she recently finished an antibiotic for some chest congestion.  She has not been complaining of any chest pain or shortness of breath.  She has not had any vomiting.  They do report that she is bed ridden but typically has normal mental status.  Her family reports that she previously was on lisinopril HCTZ but that was stopped a couple months ago due to issues with dehydration because she does not eat and drink well.    History provided by:  Relative (2 daughters and 1 granddaughter)  Leg Swelling  Severity:  Moderate  Onset quality:  Gradual  Duration:  3 weeks  Timing:  Constant  Progression:  Worsening  Chronicity:  New  Associated symptoms: myalgias    Associated symptoms: no abdominal pain, no chest pain, no fever, no shortness of breath and no vomiting        Review of Systems   Constitutional: Positive for appetite change (chronically poor). Negative for fever.   Respiratory: Negative for shortness of breath.    Cardiovascular: Positive for leg swelling. Negative for chest pain.   Gastrointestinal: Negative for abdominal pain and vomiting.   Musculoskeletal: Positive for myalgias.   Skin: Positive for wound.       Past Medical History:   Diagnosis Date   • Hip pain, acute, left    • Hypertension        No Known Allergies    No past surgical history on file.    No family history on file.    Social History      Socioeconomic History   • Marital status:    Tobacco Use   • Smoking status: Never   • Smokeless tobacco: Never   Substance and Sexual Activity   • Alcohol use: No   • Drug use: No   • Sexual activity: Defer           Objective   Physical Exam  Vitals and nursing note reviewed.   Constitutional:       Appearance: She is cachectic. She is ill-appearing.   HENT:      Head: Normocephalic and atraumatic.      Right Ear: External ear normal.      Left Ear: External ear normal.   Eyes:      Extraocular Movements: Extraocular movements intact.      Conjunctiva/sclera: Conjunctivae normal.      Pupils: Pupils are equal, round, and reactive to light.   Cardiovascular:      Rate and Rhythm: Regular rhythm. Tachycardia present.      Pulses: Normal pulses.      Heart sounds: Normal heart sounds.   Pulmonary:      Effort: Pulmonary effort is normal.      Breath sounds: Normal breath sounds. No wheezing or rhonchi.   Chest:      Chest wall: No tenderness.   Abdominal:      General: Bowel sounds are normal.      Palpations: Abdomen is soft.      Tenderness: There is no abdominal tenderness. There is no guarding or rebound.   Musculoskeletal:      Cervical back: Normal range of motion and neck supple.      Right lower leg: Edema present.      Left lower leg: Edema present.      Comments: Mild swelling noted to bilateral feet - left calf appears more swollen than right calf.   Skin:     General: Skin is warm and dry.      Capillary Refill: Capillary refill takes less than 2 seconds.      Comments: Pressure wound noted on coccyx with redness noted to buttocks   Neurological:      General: No focal deficit present.      Comments: Patient will answer yes and no questions         Procedures           ED Course  ED Course as of 12/13/22 2203   Tue Dec 13, 2022   1229 I have contacted [facility] to fax a medicine list []   1753 XR Chest 1 View  IMPRESSION:  COPD []   1807 ECG 12 Lead Tachycardia  Normal sinus rhythm.   Rate 97.  Baseline artifact noted.  Left axis deviation.  Normal QTC.  Q waves in lead II, 3, aVF, V5.  No ST elevation or depression.  Abnormal EKG.  Interpreted by me.  Electronically signed by Inocente Malone MD, 12/13/22, 6:08 PM EST.   [BC]   1811 US Venous Doppler Lower Extremity Bilateral (duplex)  FINDINGS:   No evidence of deep vein thrombus in the right lower extremity.     Left lower extremity demonstrates internal clot and noncompressibility  in the left common femoral vein.     IMPRESSION:  1. Positive DVT in the left common femoral vein. [AH]   1819 US Arterial Doppler Lower Extremity Bilateral  FINDINGS: Any stenoses are evaluated using the NASCET or similar method.     There are biphasic and triphasic waveforms throughout with the exception  of the left posterior tibial artery where the waveforms are monophasic     No occlusive segments     No significant velocity elevation.     IMPRESSION:  Monophasic waveforms in the left posterior tibial artery but  no occlusion.  [AH]   2006 Endorsed to Dr. Morales []   2137 CT PE study confirmed pulmonary artery embolism.  Radiology confirmed no obvious evidence of right heart strain.  Patient is currently on heparin drip.  Recommend admission for further work up and treatment.  Hospitalist team consulted and made aware of the patient.  Consults and orders placed per hospitalist request.  Patient was agreeable to admission plan.  Vitals stable on admission. [SF]      ED Course User Index  [AH] Taylor Meza PA  [BC] Inocente Malone MD  [SF] Contreras Morales,                                            [facility]    Final diagnoses:   Acute deep vein thrombosis (DVT) of femoral vein of left lower extremity (HCC)   Other pulmonary embolism without acute cor pulmonale, unspecified chronicity (HCC)   Electronically signed by ROSELINE Grimes, 12/13/22, 8:08 PM EST.    ED Disposition  ED Disposition     ED Disposition   Decision to Admit    Condition   --    Comment   --              No follow-up provider specified.       Medication List      No changes were made to your prescriptions during this visit.          Contreras Morales,   12/13/22 6568

## 2022-12-14 NOTE — H&P
[facility] Medicine Services  History & Physical    Patient Identification:  Name:  Ashlee Go  Age:  89 y.o.  Sex:  female  :  1925  MRN:  [id number]   Visit Number:  [id number]  Primary Care Physician:  Sharon Navarro APRN    Subjective     2022   Chief complaint:   Chief Complaint   Patient presents with   • Weakness - Generalized     History of presenting illness:      Ashlee Go is a 89 y.o. female with past medical history significant for essential hypertension who presents to [facility] ED for evaluation of generalized weakness and bilateral lower extremity swelling.    The patient's daughter is present at bedside to provide majority of history. The patient is very hard of hearing and difficult to obtain a history from. The patient only minimally participates in exam and will occasionally only mumble a few words, but according to the daughter this is the patient's baseline. The family reported to the ED provider that patient has had swelling of her lower extremities with weeping of fluid for the past 2 to 3 weeks or possibly longer.  They did note that her left leg has been more swollen than her right leg and that her feet and legs become painful at night.  She was seen by her PCP who was concerned that the patient may have CHF. The patient's daughter reports that the patient was ambulatory in  and had a bad fall requiring her to get stitches on her face and since that time the patient has been nonambulatory using a wheelchair as she is afraid to walk. The patient has had no previous history of clots. She has no known cancers. She is currently not on anticoagulation at home. She has not had any history of internal bleeding. The daughter does state that the patient does have difficulty swallowing and sometimes has a difficult time getting the patient to take her medications despite crushing them and putting them in applesauce/pudding. The patient currently  seems in no acute distress and resting comfortably in bed.     Upon arrival to the ED, vital signs were temperature 97.6, heart rate 120, respirations 18, blood pressure 117/91, SPO2 100% on room air.  CMP with glucose 106, CO2 15.3, anion gap 18.7, BUN 37, BUN/creatinine ratio 37.8, calcium 10.1, EGFR 52.6, albumin 3.06, otherwise unremarkable.  CBC with MCV 98.3, RDW 15.6, RDW-SD 56.2, otherwise unremarkable.  Troponin T negative x1.  proBNP normal.  TSH 11.880.  Free T4 1.37.  CRP 0.75.  Magnesium 2.3.  COVID-19 and influenza A/B swab negative.  Chest x-ray with findings of COPD.  CT chest PE protocol with nonocclusive eccentric thrombus within both the right and left pulmonary arteries and could potentially represent chronic thrombus but exam positive for PE, findings of the abdomen includes left renal mass and nonspecific pneumobilia with numerous tiny nodular foci scattered within the lungs which are nonspecific in setting of known left renal mass, diffuse mosaic attenuation which may reflect small vessels or small airway disease.  BLE venous Doppler positive for DVT in left common femoral vein.  Bilateral arterial Doppler with monophasic waveforms and left posterior tibial artery but no occlusion.    Patient will be admitted to the telemetry floor for further evaluation and monitoring.     ---------------------------------------------------------------------------------------------------------------------   Review of Systems   Unable to perform ROS: Other (Majority of history is obtained from daughter as patient is very hard of hearing and does not participate much in exam)   Constitutional: Negative for chills and fever.   Cardiovascular: Positive for leg swelling (L > R ).   Musculoskeletal: Positive for gait problem (uses wheelchair).        Daughter reports patient complains of bilateral leg pain most commonly at night         ---------------------------------------------------------------------------------------------------------------------   Past Medical History:   Diagnosis Date   • Hip pain, acute, left    • Hypertension      No past surgical history on file.  No family history on file.  Social History     Socioeconomic History   • Marital status:    Tobacco Use   • Smoking status: Never   • Smokeless tobacco: Never   Substance and Sexual Activity   • Alcohol use: No   • Drug use: No   • Sexual activity: Defer     ---------------------------------------------------------------------------------------------------------------------   Allergies:  Patient has no known allergies.  ---------------------------------------------------------------------------------------------------------------------   Home medications:    Medications below are reported home medications pulling from within the system; at this time, these medications have not been reconciled unless otherwise specified and are in the verification process for further verifcation as current home medications.  (Not in a hospital admission)      Hospital Scheduled Meds:     heparin, 12 Units/kg/hr, Last Rate: 12 Units/kg/hr (12/13/22 2021)        Current listed hospital scheduled medications may not yet reflect those currently placed in orders that are signed and held awaiting patient's arrival to floor.   ---------------------------------------------------------------------------------------------------------------------     Objective     Vital Signs:  Temp:  [97.6 °F (36.4 °C)] 97.6 °F (36.4 °C)  Heart Rate:  [] 96  Resp:  [18] 18  BP: (117-154)/(81-95) 138/87      12/13/22  1653   Weight: 36.3 kg (80 lb)     Body mass index is 13.73 kg/m².  ---------------------------------------------------------------------------------------------------------------------       Physical Exam  Constitutional:       General: She is awake.      Appearance: She is ill-appearing  (chronically).      Comments: Resting in bed upon arrival. Appears in no acute distress. Very hard of hearing, hears best out of left ear.   HENT:      Head: Normocephalic and atraumatic.      Right Ear: External ear normal.      Left Ear: External ear normal.      Nose: Nose normal.      Mouth/Throat:      Mouth: Mucous membranes are dry.      Pharynx: Oropharynx is clear.   Eyes:      Extraocular Movements: Extraocular movements intact.      Conjunctiva/sclera: Conjunctivae normal.      Pupils: Pupils are equal, round, and reactive to light.   Cardiovascular:      Rate and Rhythm: Normal rate and regular rhythm.      Pulses: Normal pulses.      Heart sounds: Normal heart sounds. No murmur heard.    No friction rub. No gallop.      Comments: Difficult to detect pulses of lower extremities; Arterial US obtained in ED.   Pulmonary:      Effort: Pulmonary effort is normal. No respiratory distress.      Breath sounds: Normal breath sounds. No wheezing, rhonchi or rales.      Comments: Currently on room air saturating %.  Abdominal:      General: Abdomen is flat. Bowel sounds are normal. There is no distension.      Palpations: Abdomen is soft.      Tenderness: There is no abdominal tenderness. There is no guarding.   Musculoskeletal:         General: Normal range of motion.      Cervical back: Normal range of motion and neck supple.      Right lower leg: No edema.      Left lower le+ Edema present.   Skin:     General: Skin is warm and dry.      Findings: Wound (coccyx) present.          Neurological:      General: No focal deficit present.      Mental Status: She is alert. Mental status is at baseline.      Comments: Difficult to determine as patient is minimally participating in exam and will occasionally only mumble a few words.    Psychiatric:         Mood and Affect: Mood normal.         Behavior: Behavior normal. Behavior is cooperative.         Thought Content: Thought content normal.          ---------------------------------------------------------------------------------------------------------------------  EKG:    Pending cardiology interpretation.  Per my review, EKG shows normal sinus rhythm with heart rate 97 and  ms.        I have personally looked at both the EKG and the telemetry strips.  ---------------------------------------------------------------------------------------------------------------------   Results from last 7 days   Lab Units 12/13/22  1741   CRP mg/dL 0.75*   WBC 10*3/mm3 8.51   HEMOGLOBIN g/dL 12.3   HEMATOCRIT % 40.9   MCV fL 98.3*   MCHC g/dL 30.1*   PLATELETS 10*3/mm3 335   INR  1.02         Results from last 7 days   Lab Units 12/13/22  1741   SODIUM mmol/L 138   POTASSIUM mmol/L 4.2   MAGNESIUM mg/dL 2.3   CHLORIDE mmol/L 104   CO2 mmol/L 15.3*   BUN mg/dL 37*   CREATININE mg/dL 0.98   CALCIUM mg/dL 10.1*   GLUCOSE mg/dL 106*   ALBUMIN g/dL 3.06*   BILIRUBIN mg/dL 0.3   ALK PHOS U/L 84   AST (SGOT) U/L 10   ALT (SGPT) U/L 5   Estimated Creatinine Clearance: 18.8 mL/min (by C-G formula based on SCr of 0.98 mg/dL).  No results found for: AMMONIA  Results from last 7 days   Lab Units 12/13/22 2006 12/13/22  1741   TROPONIN T ng/mL <0.010 <0.010     Results from last 7 days   Lab Units 12/13/22  1741   PROBNP pg/mL 1,335.0     Lab Results   Component Value Date    HGBA1C 5.50 09/04/2020     Lab Results   Component Value Date    TSH 11.880 (H) 12/13/2022    FREET4 1.37 12/13/2022     No results found for: PREGTESTUR, PREGSERUM, HCG, HCGQUANT  Pain Management Panel     Pain Management Panel Latest Ref Rng & Units 4/7/2022 9/4/2020    AMPHETAMINES SCREEN, URINE Negative Negative Negative    BARBITURATES SCREEN Negative Negative Negative    BENZODIAZEPINE SCREEN, URINE Negative Negative Negative    BUPRENORPHINEUR Negative Negative Negative    COCAINE SCREEN, URINE Negative Negative Negative    METHADONE SCREEN, URINE Negative Negative Negative     METHAMPHETAMINEUR Negative Negative -            ---------------------------------------------------------------------------------------------------------------------  Imaging Results (Last 7 Days)     Procedure Component Value Units Date/Time    CT Angiogram Chest Pulmonary Embolism [[id number]] Collected: 12/13/22 2143     Updated: 12/13/22 2145    Narrative:      CT CHEST PULMONARY EMBOLISM W CONTRAST    INDICATION:   Known left lower extremity DVT, evaluation for pulmonary embolus    TECHNIQUE:   CT angiogram of the chest with IV contrast. 3-D reconstructions were obtained and reviewed.   Radiation dose reduction techniques included automated exposure control or exposure modulation based on body size. Count of known CT and cardiac nuc med studies  performed in previous 12 months: 6.     COMPARISON:   CT chest October 4, 2022, CT abdomen/pelvis November 16, 2022    FINDINGS:     Adequate opacification of the pulmonary arteries. There is nonocclusive of eccentric thrombus within both the right and the left pulmonary arteries. The distal segmental branches appear well-opacified. While age indeterminate, could potentially represent  chronic thrombus but the exam is positive for pulmonary embolus.    Normal heart size. No pericardial effusion.  No lymphadenopathy within the chest.    Upper abdomen demonstrates nonspecific pneumobilia. Neither kidney is adequately visualized but would favor presence of at least a left renal mass measuring up to 3.6 cm (better demonstrated on prior CT of November 17, 2022.    Pleural parenchymal scarring at the right greater than left lung apex. There are numerous tiny nodular foci scattered within the lungs, nonspecific in the setting of known left renal mass. The pattern is overall similar as that seen in October 4, 2022  however. There is some diffuse mosaic attenuation which may reflect small vessels or small airways disease.    No destructive bony lesion.      Impression:      1.  There is nonocclusive eccentric thrombus within both the right and the left pulmonary arteries. The distal segmental branches appear well-opacified. While age indeterminate, this could potentially represent chronic thrombus but the exam is positive  for pulmonary embolus.  2. Findings in the upper abdomen better demonstrated on CT of November 17, 2022. This includes left renal mass and nonspecific pneumobilia.  3. There are numerous tiny nodular foci scattered within the lungs, nonspecific in the setting of known left renal mass. The pattern is overall similar as that seen in October 4, 2022 however. There is some diffuse mosaic attenuation which may reflect  small vessels or small airways disease      Findings were discussed by phone conversation with Dr. Morales in the ER at 9:35 PM 12/13/2022.    Signer Name: DELIM DIAZ MD   Signed: 12/13/2022 9:43 PM   [facility]    [facility]    US Arterial Doppler Lower Extremity Bilateral [[id number]] Collected: 12/13/22 1814     Updated: 12/13/22 1817    Narrative:      EXAMINATION: US ARTERIAL DOPPLER LOWER EXTREMITY  BILATERAL-      CLINICAL INDICATION: pain in legs at rest, swelling        COMPARISON: None available     PROCEDURE:  Color Doppler imaging with pulse Doppler waveform to evaluate lower  extremity arteries     FINDINGS: Any stenoses are evaluated using the NASCET or similar method.     There are biphasic and triphasic waveforms throughout with the exception  of the left posterior tibial artery where the waveforms are monophasic     No occlusive segments     No significant velocity elevation.       Impression:      Monophasic waveforms in the left posterior tibial artery but  no occlusion.      This report was finalized on 12/13/2022 6:15 PM by Dr. Gus Blue MD.       US Venous Doppler Lower Extremity Bilateral (duplex) [[id number]] Collected: 12/13/22 1759     Updated: 12/13/22 1811    Narrative:      US VENOUS  DOPPLER LOWER EXTREMITY BILATERAL (DUPLEX)-     CLINICAL INDICATION: bilateral LE swelling, L>R, painful        COMPARISON: None available      TECHNIQUE: Color Doppler imaging was used with compression and  augmentation to evaluate the lower extremity deep venous system.     FINDINGS:   No evidence of deep vein thrombus in the right lower extremity.     Left lower extremity demonstrates internal clot and noncompressibility  in the left common femoral vein.       Impression:      1. Positive DVT in the left common femoral vein.     This report was finalized on 12/13/2022 5:59 PM by Dr. Gus Blue MD.       XR Chest 1 View [[id number]] Collected: 12/13/22 1753     Updated: 12/13/22 1756    Narrative:      XR CHEST 1 VW-     CLINICAL INDICATION: peripheral edema        COMPARISON: 10/04/2022      TECHNIQUE: Single frontal view of the chest.     FINDINGS:      LUNGS: COPD      HEART AND MEDIASTINUM: Heart and mediastinal contours are unremarkable        SKELETON: Bony and soft tissue structures are unremarkable.             Impression:      COPD     This report was finalized on 12/13/2022 5:53 PM by Dr. Gus Blue MD.           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   -Continue heparin gtt and titrate per protocol.   -Pharmacy consulted to assist with pricing of NOAC's.   -Obtain a.m. TTE.   -Encourage incentive spirometer.     -Prerenal azotemia  -Anion-gap metabolic acidosis  -Likely due to poor oral intake.   -Continuous IV  fluids ordered.   -Encourage oral intake as possible.   -Repeat a.m. CMP.     -Hypercalcemia (corrected Ca for albumin = 10.9)  -Likely due to poor oral intake.   -Continuous IV fluids.   -Check PTH.   -Repeat a.m. CMP.     -Subclinical hypothyroidism  -TSH 11.880, free T4 1.37; obtain free T3.  -Will start on Synthroid 25 mcg.   -Will need repeat thyroid studies in outpatient setting.     -Macrocytosis without anemia  -Obtain vitamin B12 and folate    -Left renal mass, 3.6 cm  -Noted on CT A/P 10/4/22 measuring 3.4 cm at that time.   -Continue outpatient follow-up and monitoring.     -Decubitus ulcer of coccyx  -Currently doesn't appear actively infected.   -Wound care consulted.   -Turn patient every 2 hours.     -Essential hypertension  -BP stable.   -Monitor per hospital protocol.   -Review home medications once reconciled.     -Reported dysphagia  -SLP consulted to determine diet modifications.   -Aspiration precautions.     -Malnutrition, BMI 13.73 kg/m²  -Daily multivitamin ordered.   -Nutrition consulted to perform malnutrition severity assessment.     -------------------------------  CODE STATUS: DNR/DNI; discussed with patient's daughter at bedside    High risk secondary to acute PE, acute versus chronic DVT  -------------------------------  Expected length of stay:  INPATIENT status due to the need for care which can only be reasonably provided in an hospital setting such as aggressive/expedited ancillary services and/or consultation services, the necessity for IV medications, close physician monitoring and/or the possible need for procedures.  In such, I feel patient's risk for adverse outcomes and need for care warrant INPATIENT evaluation and predict the patient's care encounter to likely last beyond 2 midnights.     Disposition: Pending clinical course    Mindy Lang PA-C  12/13/22  22:46  EST    ---------------------------------------------------------------------------------------------------------------------

## 2022-12-14 NOTE — PLAN OF CARE
Goal Outcome Evaluation:              Pt was received on 3S this shift. IV access and telemetry monitoring maintained. Heparin drip infusing. Family remains at bedside. Wound care completed (see LDAs and Flowsheets). No s/s of acute distress noted at this time.

## 2022-12-14 NOTE — DISCHARGE PLACEMENT REQUEST
"Ashlee Go (89 y.o. Female)     Date of Birth   07/27/1925    Social Security Number       Address   [address] [place] [address]    Home Phone   [phone]    MRN   [id number]       Scientologist   None    Marital Status                               Admission Date   12/13/22    Admission Type   Emergency    Admitting Provider   Katharina Hernandez DO    Attending Provider   Alvarez Steve DO    Department, Room/Bed   [address], 3310/2S       Discharge Date       Discharge Disposition   Home or Self Care    Discharge Destination                               Attending Provider: Alvarez Steve DO    Allergies: No Known Allergies    Isolation: None   Infection: None   Code Status: No CPR    Ht: 162.6 cm (64\")   Wt: 36.3 kg (80 lb)    Admission Cmt: None   Principal Problem: Acute deep vein thrombosis (DVT) of femoral vein of left lower extremity (HCC) [I82.412]                 Active Insurance as of 12/13/2022     Primary Coverage     Payor Plan Insurance Group Employer/Plan Group    MEDICARE MEDICARE A & B      Payor Plan Address Payor Plan Phone Number Payor Plan Fax Number Effective Dates    [address] [phone]  7/1/1990 - None Entered    [facility] [id number]       Subscriber Name Subscriber Birth Date Member ID       ASHLEE GO 7/27/1925 [id number]           Secondary Coverage     Payor Plan Insurance Group Employer/Plan Group    [facility] [id number]     Payor Plan Address Payor Plan Phone Number Payor Plan Fax Number Effective Dates    [address] [address] [phone]  1/1/2010 - None Entered    [facility] [id number]       Subscriber Name Subscriber Birth Date Member ID       ASHLEE GO 7/27/1925 [id number]                 Emergency Contacts      (Rel.) Home Phone Work Phone Mobile Phone    OSVALDO GO (Daughter) [phone] -- --    Bella John (Daughter) [phone] -- --    BREONNATRACI (Daughter) -- -- [phone]    " ANNETTE CAM (Daughter) [phone] -- --            Emergency Contact Information     Name Relation Home Work Mobile    OSVALDO GO Daughter [phone]      Bella John Daughter [phone]      TRACI GO Daughter   [phone]    ANNETTE CAM Daughter [phone]            Insurance Information                MEDICARE/MEDICARE A & B Phone: [phone]    Subscriber: Ashlee Go Subscriber#: [id number]    Group#: -- Precert#: --        UMR/[facility]R Phone: [phone]    Subscriber: Ashlee Go Subscriber#: [id number]    Group#: [id number] Precert#: --          Treatment Team     Provider Relationship Specialty Contact    Alvarez Steve DO Attending, Physician of Record Hospitalist   [phone]      Ariela Carrasco, PCT Patient Care Technician -- --    Angy Andujar RN Registered Nurse -- --    Giana Marx BSW     [phone]      Imani Verma, RRT Respiratory Therapist --   [phone]               History & Physical      Mindy Lang PA-C at 22 0647     Attestation signed by Katharina Hernandez DO at 22 0608    I have reviewed this documentation and agree.                      [facility] Medicine Services  History & Physical    Patient Identification:  Name:  Ashlee Go  Age:  89 y.o.  Sex:  female  :  1925  MRN:  [id number]   Visit Number:  [id number]  Primary Care Physician:  Sharon Navarro APRN    Subjective     2022   Chief complaint:   Chief Complaint   Patient presents with   • Weakness - Generalized     History of presenting illness:      Ashlee Go is a 89 y.o. female with past medical history significant for essential hypertension who presents to [facility] ED for evaluation of generalized weakness and bilateral lower extremity swelling.    The patient's daughter is present at bedside to provide majority of history. The patient is very hard of hearing and difficult to obtain a history  from. The patient only minimally participates in exam and will occasionally only mumble a few words, but according to the daughter this is the patient's baseline. The family reported to the ED provider that patient has had swelling of her lower extremities with weeping of fluid for the past 2 to 3 weeks or possibly longer.  They did note that her left leg has been more swollen than her right leg and that her feet and legs become painful at night.  She was seen by her PCP who was concerned that the patient may have CHF. The patient's daughter reports that the patient was ambulatory in June and had a bad fall requiring her to get stitches on her face and since that time the patient has been nonambulatory using a wheelchair as she is afraid to walk. The patient has had no previous history of clots. She has no known cancers. She is currently not on anticoagulation at home. She has not had any history of internal bleeding. The daughter does state that the patient does have difficulty swallowing and sometimes has a difficult time getting the patient to take her medications despite crushing them and putting them in applesauce/pudding. The patient currently seems in no acute distress and resting comfortably in bed.     Upon arrival to the ED, vital signs were temperature 97.6, heart rate 120, respirations 18, blood pressure 117/91, SPO2 100% on room air.  CMP with glucose 106, CO2 15.3, anion gap 18.7, BUN 37, BUN/creatinine ratio 37.8, calcium 10.1, EGFR 52.6, albumin 3.06, otherwise unremarkable.  CBC with MCV 98.3, RDW 15.6, RDW-SD 56.2, otherwise unremarkable.  Troponin T negative x1.  proBNP normal.  TSH 11.880.  Free T4 1.37.  CRP 0.75.  Magnesium 2.3.  COVID-19 and influenza A/B swab negative.  Chest x-ray with findings of COPD.  CT chest PE protocol with nonocclusive eccentric thrombus within both the right and left pulmonary arteries and could potentially represent chronic thrombus but exam positive for PE,  findings of the abdomen includes left renal mass and nonspecific pneumobilia with numerous tiny nodular foci scattered within the lungs which are nonspecific in setting of known left renal mass, diffuse mosaic attenuation which may reflect small vessels or small airway disease.  BLE venous Doppler positive for DVT in left common femoral vein.  Bilateral arterial Doppler with monophasic waveforms and left posterior tibial artery but no occlusion.    Patient will be admitted to the telemetry floor for further evaluation and monitoring.     ---------------------------------------------------------------------------------------------------------------------   Review of Systems   Unable to perform ROS: Other (Majority of history is obtained from daughter as patient is very hard of hearing and does not participate much in exam)   Constitutional: Negative for chills and fever.   Cardiovascular: Positive for leg swelling (L > R ).   Musculoskeletal: Positive for gait problem (uses wheelchair).        Daughter reports patient complains of bilateral leg pain most commonly at night        ---------------------------------------------------------------------------------------------------------------------   Past Medical History:   Diagnosis Date   • Hip pain, acute, left    • Hypertension      No past surgical history on file.  No family history on file.  Social History     Socioeconomic History   • Marital status:    Tobacco Use   • Smoking status: Never   • Smokeless tobacco: Never   Substance and Sexual Activity   • Alcohol use: No   • Drug use: No   • Sexual activity: Defer     ---------------------------------------------------------------------------------------------------------------------   Allergies:  Patient has no known allergies.  ---------------------------------------------------------------------------------------------------------------------   Home medications:    Medications below are reported home  medications pulling from within the system; at this time, these medications have not been reconciled unless otherwise specified and are in the verification process for further verifcation as current home medications.  (Not in a hospital admission)      Hospital Scheduled Meds:     heparin, 12 Units/kg/hr, Last Rate: 12 Units/kg/hr (12/13/22 2021)        Current listed hospital scheduled medications may not yet reflect those currently placed in orders that are signed and held awaiting patient's arrival to floor.   ---------------------------------------------------------------------------------------------------------------------     Objective     Vital Signs:  Temp:  [97.6 °F (36.4 °C)] 97.6 °F (36.4 °C)  Heart Rate:  [] 96  Resp:  [18] 18  BP: (117-154)/(81-95) 138/87      12/13/22  1653   Weight: 36.3 kg (80 lb)     Body mass index is 13.73 kg/m².  ---------------------------------------------------------------------------------------------------------------------       Physical Exam  Constitutional:       General: She is awake.      Appearance: She is ill-appearing (chronically).      Comments: Resting in bed upon arrival. Appears in no acute distress. Very hard of hearing, hears best out of left ear.   HENT:      Head: Normocephalic and atraumatic.      Right Ear: External ear normal.      Left Ear: External ear normal.      Nose: Nose normal.      Mouth/Throat:      Mouth: Mucous membranes are dry.      Pharynx: Oropharynx is clear.   Eyes:      Extraocular Movements: Extraocular movements intact.      Conjunctiva/sclera: Conjunctivae normal.      Pupils: Pupils are equal, round, and reactive to light.   Cardiovascular:      Rate and Rhythm: Normal rate and regular rhythm.      Pulses: Normal pulses.      Heart sounds: Normal heart sounds. No murmur heard.    No friction rub. No gallop.      Comments: Difficult to detect pulses of lower extremities; Arterial US obtained in ED.   Pulmonary:      Effort:  Pulmonary effort is normal. No respiratory distress.      Breath sounds: Normal breath sounds. No wheezing, rhonchi or rales.      Comments: Currently on room air saturating %.  Abdominal:      General: Abdomen is flat. Bowel sounds are normal. There is no distension.      Palpations: Abdomen is soft.      Tenderness: There is no abdominal tenderness. There is no guarding.   Musculoskeletal:         General: Normal range of motion.      Cervical back: Normal range of motion and neck supple.      Right lower leg: No edema.      Left lower le+ Edema present.   Skin:     General: Skin is warm and dry.      Findings: Wound (coccyx) present.          Neurological:      General: No focal deficit present.      Mental Status: She is alert. Mental status is at baseline.      Comments: Difficult to determine as patient is minimally participating in exam and will occasionally only mumble a few words.    Psychiatric:         Mood and Affect: Mood normal.         Behavior: Behavior normal. Behavior is cooperative.         Thought Content: Thought content normal.         ---------------------------------------------------------------------------------------------------------------------  EKG:    Pending cardiology interpretation.  Per my review, EKG shows normal sinus rhythm with heart rate 97 and  ms.        I have personally looked at both the EKG and the telemetry strips.  ---------------------------------------------------------------------------------------------------------------------   Results from last 7 days   Lab Units 22  1741   CRP mg/dL 0.75*   WBC 10*3/mm3 8.51   HEMOGLOBIN g/dL 12.3   HEMATOCRIT % 40.9   MCV fL 98.3*   MCHC g/dL 30.1*   PLATELETS 10*3/mm3 335   INR  1.02         Results from last 7 days   Lab Units 22  1741   SODIUM mmol/L 138   POTASSIUM mmol/L 4.2   MAGNESIUM mg/dL 2.3   CHLORIDE mmol/L 104   CO2 mmol/L 15.3*   BUN mg/dL 37*   CREATININE mg/dL 0.98   CALCIUM mg/dL  10.1*   GLUCOSE mg/dL 106*   ALBUMIN g/dL 3.06*   BILIRUBIN mg/dL 0.3   ALK PHOS U/L 84   AST (SGOT) U/L 10   ALT (SGPT) U/L 5   Estimated Creatinine Clearance: 18.8 mL/min (by C-G formula based on SCr of 0.98 mg/dL).  No results found for: AMMONIA  Results from last 7 days   Lab Units 12/13/22 2006 12/13/22 1741   TROPONIN T ng/mL <0.010 <0.010     Results from last 7 days   Lab Units 12/13/22  1741   PROBNP pg/mL 1,335.0     Lab Results   Component Value Date    HGBA1C 5.50 09/04/2020     Lab Results   Component Value Date    TSH 11.880 (H) 12/13/2022    FREET4 1.37 12/13/2022     No results found for: PREGTESTUR, PREGSERUM, HCG, HCGQUANT  Pain Management Panel     Pain Management Panel Latest Ref Rng & Units 4/7/2022 9/4/2020    AMPHETAMINES SCREEN, URINE Negative Negative Negative    BARBITURATES SCREEN Negative Negative Negative    BENZODIAZEPINE SCREEN, URINE Negative Negative Negative    BUPRENORPHINEUR Negative Negative Negative    COCAINE SCREEN, URINE Negative Negative Negative    METHADONE SCREEN, URINE Negative Negative Negative    METHAMPHETAMINEUR Negative Negative -            ---------------------------------------------------------------------------------------------------------------------  Imaging Results (Last 7 Days)     Procedure Component Value Units Date/Time    CT Angiogram Chest Pulmonary Embolism [[id number]] Collected: 12/13/22 2143     Updated: 12/13/22 2145    Narrative:      CT CHEST PULMONARY EMBOLISM W CONTRAST    INDICATION:   Known left lower extremity DVT, evaluation for pulmonary embolus    TECHNIQUE:   CT angiogram of the chest with IV contrast. 3-D reconstructions were obtained and reviewed.   Radiation dose reduction techniques included automated exposure control or exposure modulation based on body size. Count of known CT and cardiac nuc med studies  performed in previous 12 months: 6.     COMPARISON:   CT chest October 4, 2022, CT abdomen/pelvis November 16,  2022    FINDINGS:     Adequate opacification of the pulmonary arteries. There is nonocclusive of eccentric thrombus within both the right and the left pulmonary arteries. The distal segmental branches appear well-opacified. While age indeterminate, could potentially represent  chronic thrombus but the exam is positive for pulmonary embolus.    Normal heart size. No pericardial effusion.  No lymphadenopathy within the chest.    Upper abdomen demonstrates nonspecific pneumobilia. Neither kidney is adequately visualized but would favor presence of at least a left renal mass measuring up to 3.6 cm (better demonstrated on prior CT of November 17, 2022.    Pleural parenchymal scarring at the right greater than left lung apex. There are numerous tiny nodular foci scattered within the lungs, nonspecific in the setting of known left renal mass. The pattern is overall similar as that seen in October 4, 2022  however. There is some diffuse mosaic attenuation which may reflect small vessels or small airways disease.    No destructive bony lesion.      Impression:      1. There is nonocclusive eccentric thrombus within both the right and the left pulmonary arteries. The distal segmental branches appear well-opacified. While age indeterminate, this could potentially represent chronic thrombus but the exam is positive  for pulmonary embolus.  2. Findings in the upper abdomen better demonstrated on CT of November 17, 2022. This includes left renal mass and nonspecific pneumobilia.  3. There are numerous tiny nodular foci scattered within the lungs, nonspecific in the setting of known left renal mass. The pattern is overall similar as that seen in October 4, 2022 however. There is some diffuse mosaic attenuation which may reflect  small vessels or small airways disease      Findings were discussed by phone conversation with Dr. Morales in the ER at 9:35 PM 12/13/2022.    Signer Name: DELMI DIAZ MD   Signed: 12/13/2022 9:43 PM    Workstation Name: [facility]    [facility]    US Arterial Doppler Lower Extremity Bilateral [[id number]] Collected: 12/13/22 1814     Updated: 12/13/22 1817    Narrative:      EXAMINATION: US ARTERIAL DOPPLER LOWER EXTREMITY  BILATERAL-      CLINICAL INDICATION: pain in legs at rest, swelling        COMPARISON: None available     PROCEDURE:  Color Doppler imaging with pulse Doppler waveform to evaluate lower  extremity arteries     FINDINGS: Any stenoses are evaluated using the NASCET or similar method.     There are biphasic and triphasic waveforms throughout with the exception  of the left posterior tibial artery where the waveforms are monophasic     No occlusive segments     No significant velocity elevation.       Impression:      Monophasic waveforms in the left posterior tibial artery but  no occlusion.      This report was finalized on 12/13/2022 6:15 PM by Dr. Gus Blue MD.       US Venous Doppler Lower Extremity Bilateral (duplex) [[id number]] Collected: 12/13/22 1759     Updated: 12/13/22 1811    Narrative:      US VENOUS DOPPLER LOWER EXTREMITY BILATERAL (DUPLEX)-     CLINICAL INDICATION: bilateral LE swelling, L>R, painful        COMPARISON: None available      TECHNIQUE: Color Doppler imaging was used with compression and  augmentation to evaluate the lower extremity deep venous system.     FINDINGS:   No evidence of deep vein thrombus in the right lower extremity.     Left lower extremity demonstrates internal clot and noncompressibility  in the left common femoral vein.       Impression:      1. Positive DVT in the left common femoral vein.     This report was finalized on 12/13/2022 5:59 PM by Dr. Gus Blue MD.       XR Chest 1 View [[id number]] Collected: 12/13/22 1753     Updated: 12/13/22 1756    Narrative:      XR CHEST 1 VW-     CLINICAL INDICATION: peripheral edema        COMPARISON: 10/04/2022      TECHNIQUE: Single frontal view of the chest.     FINDINGS:       LUNGS: COPD      HEART AND MEDIASTINUM: Heart and mediastinal contours are unremarkable        SKELETON: Bony and soft tissue structures are unremarkable.             Impression:      COPD     This report was finalized on 12/13/2022 5:53 PM by Dr. Gus Blue MD.           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   -Continue heparin gtt and titrate per protocol.   -Pharmacy consulted to assist with pricing of NOAC's.   -Obtain a.m. TTE.   -Encourage incentive spirometer.     -Prerenal azotemia  -Anion-gap metabolic acidosis  -Likely due to poor oral intake.   -Continuous IV fluids ordered.   -Encourage oral intake as possible.   -Repeat a.m. CMP.     -Hypercalcemia (corrected Ca for albumin = 10.9)  -Likely due to poor oral intake.   -Continuous IV fluids.   -Check PTH.   -Repeat a.m. CMP.     -Subclinical hypothyroidism  -TSH 11.880, free T4 1.37; obtain free T3.  -Will start on Synthroid 25 mcg.   -Will need repeat thyroid studies in outpatient setting.     -Macrocytosis without anemia  -Obtain vitamin B12 and folate    -Left renal mass, 3.6 cm  -Noted on CT A/P 10/4/22 measuring 3.4 cm at that time.   -Continue outpatient follow-up and monitoring.     -Decubitus ulcer of coccyx  -Currently doesn't appear actively infected.   -Wound care consulted.   -Turn patient every 2 hours.     -Essential hypertension  -BP stable.   -Monitor per hospital protocol.   -Review home medications once reconciled.     -Reported dysphagia  -SLP  consulted to determine diet modifications.   -Aspiration precautions.     -Malnutrition, BMI 13.73 kg/m²  -Daily multivitamin ordered.   -Nutrition consulted to perform malnutrition severity assessment.     -------------------------------  CODE STATUS: DNR/DNI; discussed with patient's daughter at bedside    High risk secondary to acute PE, acute versus chronic DVT  -------------------------------  Expected length of stay:  INPATIENT status due to the need for care which can only be reasonably provided in an hospital setting such as aggressive/expedited ancillary services and/or consultation services, the necessity for IV medications, close physician monitoring and/or the possible need for procedures.  In such, I feel patient's risk for adverse outcomes and need for care warrant INPATIENT evaluation and predict the patient's care encounter to likely last beyond 2 midnights.     Disposition: Pending clinical course    Mindy Lang PA-C  12/13/22  22:46 EST    ---------------------------------------------------------------------------------------------------------------------           Electronically signed by Katharina Hernandez DO at 12/14/22 0647       Vital Signs (last day)     Date/Time Temp Temp src Pulse Resp BP Patient Position SpO2    12/14/22 1021 94.9 (34.9) Axillary 79 18 137/79 Lying 94    12/14/22 0654 98 (36.7) Axillary 82 18 131/81 Lying 99    12/13/22 2335 98.5 (36.9) Axillary 93 18 144/90 Lying 99    12/13/22 2216 -- -- 96 -- 138/87 -- 99    12/13/22 2201 -- -- 98 -- 135/93 -- 100    12/13/22 2146 -- -- 87 -- 141/86 -- 98    12/13/22 2131 -- -- 85 -- 150/82 -- 96    12/13/22 2046 -- -- -- -- 151/89 -- --    12/13/22 2031 -- -- -- -- 138/83 -- --    12/13/22 2016 -- -- 89 -- 144/90 -- 99    12/13/22 1946 -- -- 90 -- 154/81 -- 97    12/13/22 1931 -- -- 101 -- 130/95 -- 100    12/13/22 1916 -- -- 94 -- 130/82 -- 98    12/13/22 1901 -- -- 99 -- 142/93 -- --    12/13/22 1653 97.6 (36.4) Axillary  120 18 117/91 Sitting 100          Lines, Drains & Airways     Active LDAs     None                  Current Facility-Administered Medications   Medication Dose Route Frequency Provider Last Rate Last Admin   • acetaminophen (TYLENOL) tablet 650 mg  650 mg Oral Q6H PRN Katharina Hernandez DO       • apixaban (ELIQUIS) tablet 10 mg  10 mg Oral Q12H Alvarez Steve, DO   10 mg at 12/14/22 1328    Followed by   • [START ON 12/21/2022] apixaban (ELIQUIS) tablet 5 mg  5 mg Oral Q12H Alvarez Steve DO       • guaifenesin (ROBITUSSIN) 100 MG/5ML liquid 200 mg  200 mg Oral 4x Daily PRN Katharina Hernandez DO       • HYDROcodone-acetaminophen (NORCO) 5-325 MG per tablet 1 tablet  1 tablet Oral Q8H PRN Katharina Hernandez DO       • [START ON 12/15/2022] levothyroxine (SYNTHROID, LEVOTHROID) tablet 25 mcg  25 mcg Oral Q AM Mindy Lang PA-C       • lidocaine (LIDODERM) 5 % 1 patch  1 patch Transdermal Q24H Ktaharina Hernandez DO   1 patch at 12/14/22 0938   • lisinopril (PRINIVIL,ZESTRIL) tablet 20 mg  20 mg Oral Daily Katharina Hernandez DO   20 mg at 12/14/22 0939   • magic barrier cream 1 application  1 application Topical 4x Daily PRN Mindy Lang PA-C       • megestrol (MEGACE) 40 MG/ML suspension 800 mg  800 mg Oral Daily Katharina Hernandez DO   800 mg at 12/14/22 0939   • multivitamin (THERAGRAN) tablet 1 tablet  1 tablet Oral Daily Mindy Lang PA-C   1 tablet at 12/14/22 0939   • nitroglycerin (NITROSTAT) SL tablet 0.4 mg  0.4 mg Sublingual Q5 Min PRN Katharina Hernandez DO       • pantoprazole (PROTONIX) EC tablet 40 mg  40 mg Oral QAM Katharina Hernandez DO       • Pharmacy Consult   Does not apply Continuous PRN Katharina Hernandez DO       • Pharmacy Consult   Does not apply Continuous PRN Mindy Lang PA-C       • polyethylene glycol (MIRALAX) packet 17 g  17 g Oral Daily Katharina Hrenandez DO       • sodium chloride 0.9 % flush 10 mL  10 mL  Intravenous PRN Katharina Hernandez,        • sodium chloride 0.9 % flush 10 mL  10 mL Intravenous Q12H Katharina Hernandez DO   10 mL at 12/14/22 0939   • sodium chloride 0.9 % flush 10 mL  10 mL Intravenous PRN Katharina Hernandez DO       • sodium chloride 0.9 % infusion 40 mL  40 mL Intravenous PRN Katharina Hernandez,            Lab Results (last 24 hours)     Procedure Component Value Units Date/Time    Folate [[id number]]  (Normal) Collected: 12/14/22 0217    Specimen: Blood Updated: 12/14/22 1332     Folate 7.90 ng/mL     Narrative:      Results may be falsely increased if patient taking Biotin.      Vitamin B12 [[id number]]  (Normal) Collected: 12/14/22 0217    Specimen: Blood Updated: 12/14/22 1332     Vitamin B-12 296 pg/mL     Narrative:      Results may be falsely increased if patient taking Biotin.      T3, Free [[id number]]  (Abnormal) Collected: 12/13/22 2006    Specimen: Blood from Arm, Right Updated: 12/14/22 1319     T3, Free 1.90 pg/mL     Narrative:      Results may be falsely increased if patient taking Biotin.      aPTT [[id number]]  (Abnormal) Collected: 12/14/22 0812    Specimen: Blood Updated: 12/14/22 0846     PTT 57.5 seconds     Narrative:      PTT Heparin Therapeutic Range:  59 - 95 seconds      CBC & Differential [[id number]]  (Abnormal) Collected: 12/14/22 0552    Specimen: Blood Updated: 12/14/22 0612    Narrative:      The following orders were created for panel order CBC & Differential.  Procedure                               Abnormality         Status                     ---------                               -----------         ------                     CBC Auto Differential[[id number]]        Abnormal            Final result                 Please view results for these tests on the individual orders.    CBC Auto Differential [[id number]]  (Abnormal) Collected: 12/14/22 0552    Specimen: Blood Updated: 12/14/22 0612     WBC 8.59 10*3/mm3      RBC 3.45 10*6/mm3       Hemoglobin 10.2 g/dL      Hematocrit 32.8 %      MCV 95.1 fL      MCH 29.6 pg      MCHC 31.1 g/dL      RDW 15.5 %      RDW-SD 53.7 fl      MPV 9.9 fL      Platelets 289 10*3/mm3      Neutrophil % 71.3 %      Lymphocyte % 16.3 %      Monocyte % 6.3 %      Eosinophil % 5.4 %      Basophil % 0.5 %      Immature Grans % 0.2 %      Neutrophils, Absolute 6.13 10*3/mm3      Lymphocytes, Absolute 1.40 10*3/mm3      Monocytes, Absolute 0.54 10*3/mm3      Eosinophils, Absolute 0.46 10*3/mm3      Basophils, Absolute 0.04 10*3/mm3      Immature Grans, Absolute 0.02 10*3/mm3      nRBC 0.0 /100 WBC     aPTT [[id number]]  (Abnormal) Collected: 12/14/22 0217    Specimen: Blood Updated: 12/14/22 0249     PTT 67.8 seconds     Narrative:      PTT Heparin Therapeutic Range:  59 - 95 seconds      Comprehensive Metabolic Panel [[id number]]  (Abnormal) Collected: 12/14/22 0217    Specimen: Blood Updated: 12/14/22 0247     Glucose 120 mg/dL      BUN 37 mg/dL      Creatinine 0.89 mg/dL      Sodium 135 mmol/L      Potassium 3.7 mmol/L      Comment: Slight hemolysis detected by analyzer. Results may be affected.        Chloride 104 mmol/L      CO2 17.0 mmol/L      Calcium 9.7 mg/dL      Total Protein 6.1 g/dL      Albumin 2.75 g/dL      ALT (SGPT) 7 U/L      AST (SGOT) 11 U/L      Alkaline Phosphatase 78 U/L      Total Bilirubin 0.3 mg/dL      Globulin 3.4 gm/dL      A/G Ratio 0.8 g/dL      BUN/Creatinine Ratio 41.6     Anion Gap 14.0 mmol/L      eGFR 59.0 mL/min/1.73      Comment: National Kidney Foundation and American Society of Nephrology (ASN) Task Force recommended calculation based on the Chronic Kidney Disease Epidemiology Collaboration (CKD-EPI) equation refit without adjustment for race.       Narrative:      GFR Normal >60  Chronic Kidney Disease <60  Kidney Failure <15    The GFR formula is only valid for adults with stable renal function between ages 18 and 70.    PTH, Intact [[id number]]  (Normal) Collected: 12/13/22 6080     Specimen: Blood from Arm, Left Updated: 12/14/22 0030     PTH, Intact 23.1 pg/mL     Narrative:      Please note the potential for biotin to falsely depress the PTH result when high levels of biotin surpassing the daily recommended dose are administered.    Results may be falsely decreased if patient taking Biotin.      Troponin [[id number]]  (Normal) Collected: 12/13/22 2006    Specimen: Blood from Arm, Right Updated: 12/13/22 2032     Troponin T <0.010 ng/mL     Narrative:      Troponin T Reference Range:  <= 0.03 ng/mL-   Negative for AMI  >0.03 ng/mL-     Abnormal for myocardial necrosis.  Clinicians would have to utilize clinical acumen, EKG, Troponin and serial changes to determine if it is an Acute Myocardial Infarction or myocardial injury due to an underlying chronic condition.       Results may be falsely decreased if patient taking Biotin.      [facility] Draw [[id number]] Collected: 12/13/22 1741    Specimen: Blood from Arm, Left Updated: 12/13/22 1845    Narrative:      The following orders were created for panel order [facility] Draw.  Procedure                               Abnormality         Status                     ---------                               -----------         ------                     Green Top (Gel)[[id number]]                                  Final result               Lavender Top[[id number]]                                     Final result               Gold Top - SST[[id number]]                                   Final result               Light Blue Top[[id number]]                                   Final result                 Please view results for these tests on the individual orders.    Lavender Top [[id number]] Collected: 12/13/22 1741    Specimen: Blood from Arm, Left Updated: 12/13/22 1845     Extra Tube hold for add-on     Comment: Auto resulted       Green Top (Gel) [[id number]] Collected: 12/13/22 1741    Specimen: Blood from Arm, Left Updated: 12/13/22 1845     Extra Tube Hold  for add-ons.     Comment: Auto resulted.       Gold Top - SST [[id number]] Collected: 12/13/22 1741    Specimen: Blood from Arm, Left Updated: 12/13/22 1845     Extra Tube Hold for add-ons.     Comment: Auto resulted.       Light Blue Top [[id number]] Collected: 12/13/22 1741    Specimen: Blood from Arm, Left Updated: 12/13/22 1845     Extra Tube Hold for add-ons.     Comment: Auto resulted       BNP [[id number]]  (Normal) Collected: 12/13/22 1741    Specimen: Blood from Arm, Left Updated: 12/13/22 1823     proBNP 1,335.0 pg/mL     Narrative:      Among patients with dyspnea, NT-proBNP is highly sensitive for the detection of acute congestive heart failure. In addition NT-proBNP of <300 pg/ml effectively rules out acute congestive heart failure with 99% negative predictive value.      Troponin [[id number]]  (Normal) Collected: 12/13/22 1741    Specimen: Blood from Arm, Left Updated: 12/13/22 1823     Troponin T <0.010 ng/mL     Narrative:      Troponin T Reference Range:  <= 0.03 ng/mL-   Negative for AMI  >0.03 ng/mL-     Abnormal for myocardial necrosis.  Clinicians would have to utilize clinical acumen, EKG, Troponin and serial changes to determine if it is an Acute Myocardial Infarction or myocardial injury due to an underlying chronic condition.       Results may be falsely decreased if patient taking Biotin.      TSH [[id number]]  (Abnormal) Collected: 12/13/22 1741    Specimen: Blood from Arm, Left Updated: 12/13/22 1823     TSH 11.880 uIU/mL     T4, Free [[id number]]  (Normal) Collected: 12/13/22 1741    Specimen: Blood from Arm, Left Updated: 12/13/22 1823     Free T4 1.37 ng/dL     Narrative:      Results may be falsely increased if patient taking Biotin.      Comprehensive Metabolic Panel [[id number]]  (Abnormal) Collected: 12/13/22 1741    Specimen: Blood from Arm, Left Updated: 12/13/22 1819     Glucose 106 mg/dL      BUN 37 mg/dL      Creatinine 0.98 mg/dL      Sodium 138 mmol/L      Potassium 4.2 mmol/L       Comment: Slight hemolysis detected by analyzer. Results may be affected.        Chloride 104 mmol/L      CO2 15.3 mmol/L      Calcium 10.1 mg/dL      Total Protein 6.3 g/dL      Albumin 3.06 g/dL      ALT (SGPT) 5 U/L      AST (SGOT) 10 U/L      Alkaline Phosphatase 84 U/L      Total Bilirubin 0.3 mg/dL      Globulin 3.2 gm/dL      A/G Ratio 0.9 g/dL      BUN/Creatinine Ratio 37.8     Anion Gap 18.7 mmol/L      eGFR 52.6 mL/min/1.73      Comment: National Kidney Foundation and American Society of Nephrology (ASN) Task Force recommended calculation based on the Chronic Kidney Disease Epidemiology Collaboration (CKD-EPI) equation refit without adjustment for race.       Narrative:      GFR Normal >60  Chronic Kidney Disease <60  Kidney Failure <15    The GFR formula is only valid for adults with stable renal function between ages 18 and 70.    C-reactive Protein [[id number]]  (Abnormal) Collected: 12/13/22 1741    Specimen: Blood from Arm, Left Updated: 12/13/22 1817     C-Reactive Protein 0.75 mg/dL     Magnesium [[id number]]  (Normal) Collected: 12/13/22 1741    Specimen: Blood from Arm, Left Updated: 12/13/22 1817     Magnesium 2.3 mg/dL     COVID PRE-OP / PRE-PROCEDURE SCREENING ORDER (NO ISOLATION) - Swab, Nasopharynx [[id number]]  (Normal) Collected: 12/13/22 1743    Specimen: Swab from Nasopharynx Updated: 12/13/22 1814    Narrative:      The following orders were created for panel order COVID PRE-OP / PRE-PROCEDURE SCREENING ORDER (NO ISOLATION) - Swab, Nasopharynx.  Procedure                               Abnormality         Status                     ---------                               -----------         ------                     COVID-19 and FLU A/B PCR...[[id number]]  Normal              Final result                 Please view results for these tests on the individual orders.    COVID-19 and FLU A/B PCR - Swab, Nasopharynx [[id number]]  (Normal) Collected: 12/13/22 1743    Specimen: Swab from Nasopharynx  Updated: 12/13/22 1814     COVID19 Not Detected     Influenza A PCR Not Detected     Influenza B PCR Not Detected    Narrative:      Fact sheet for providers: https://www.fda.gov/media/813667/download    Fact sheet for patients: https://www.fda.gov/media/823720/download    Test performed by PCR.    Protime-INR [[id number]]  (Normal) Collected: 12/13/22 1741    Specimen: Blood from Arm, Left Updated: 12/13/22 1800     Protime 13.7 Seconds      INR 1.02    Narrative:      Suggested INR therapeutic range for stable oral anticoagulant therapy:    Low Intensity therapy:   1.5-2.0  Moderate Intensity therapy:   2.0-3.0  High Intensity therapy:   2.5-4.0    aPTT [[id number]]  (Abnormal) Collected: 12/13/22 1741    Specimen: Blood from Arm, Left Updated: 12/13/22 1800     PTT 20.0 seconds     Narrative:      PTT Heparin Therapeutic Range:  59 - 95 seconds      CBC & Differential [[id number]]  (Abnormal) Collected: 12/13/22 1741    Specimen: Blood from Arm, Left Updated: 12/13/22 1750    Narrative:      The following orders were created for panel order CBC & Differential.  Procedure                               Abnormality         Status                     ---------                               -----------         ------                     CBC Auto Differential[[id number]]        Abnormal            Final result                 Please view results for these tests on the individual orders.    CBC Auto Differential [[id number]]  (Abnormal) Collected: 12/13/22 1741    Specimen: Blood from Arm, Left Updated: 12/13/22 1750     WBC 8.51 10*3/mm3      RBC 4.16 10*6/mm3      Hemoglobin 12.3 g/dL      Hematocrit 40.9 %      MCV 98.3 fL      MCH 29.6 pg      MCHC 30.1 g/dL      RDW 15.6 %      RDW-SD 56.2 fl      MPV 9.9 fL      Platelets 335 10*3/mm3      Neutrophil % 74.9 %      Lymphocyte % 15.5 %      Monocyte % 4.6 %      Eosinophil % 4.0 %      Basophil % 0.4 %      Immature Grans % 0.6 %      Neutrophils, Absolute 6.38 10*3/mm3       Lymphocytes, Absolute 1.32 10*3/mm3      Monocytes, Absolute 0.39 10*3/mm3      Eosinophils, Absolute 0.34 10*3/mm3      Basophils, Absolute 0.03 10*3/mm3      Immature Grans, Absolute 0.05 10*3/mm3      nRBC 0.0 /100 WBC         Orders (last 24 hrs)      Start     Ordered    12/21/22 0900  apixaban (ELIQUIS) tablet 5 mg  Every 12 Hours Scheduled        See Hyperspace for full Linked Orders Report.    12/14/22 1254    12/21/22 0000  apixaban (ELIQUIS) 5 MG tablet tablet  Every 12 Hours Scheduled         12/14/22 1258    12/15/22 0600  levothyroxine (SYNTHROID, LEVOTHROID) tablet 25 mcg  Every Early Morning         12/14/22 0012    12/15/22 0600  aPTT  Morning Draw         12/14/22 0855    12/15/22 0000  levothyroxine (SYNTHROID, LEVOTHROID) 25 MCG tablet  Every Early Morning         12/14/22 1258    12/15/22 0000  multivitamin (THERAGRAN) tablet tablet  Daily         12/14/22 1258    12/14/22 1400  apixaban (ELIQUIS) tablet 10 mg  Every 12 Hours Scheduled        See Hyperspace for full Linked Orders Report.    12/14/22 1254    12/14/22 1256  Discharge patient  Once         12/14/22 1258    12/14/22 1041  Diet: Regular/House Diet; Feeding Assistance - Nursing; Texture: Pureed (NDD 1); Fluid Consistency: Thin (IDDSI 0)  Diet Effective Now         12/14/22 1041    12/14/22 1041  Aspiration Precautions  Continuous         12/14/22 1041    12/14/22 1041  Reflux Precautions  Continuous         12/14/22 1041    12/14/22 1041  Oral Care  Once         12/14/22 1041    12/14/22 1041  Diet Instructions To Nursing  Until Discontinued        Comments: Medications crushed in puree. 1:1 assistance with all po intake.    12/14/22 1041    12/14/22 0900  lidocaine (LIDODERM) 5 % 1 patch  Every 24 Hours Scheduled         12/14/22 0302    12/14/22 0900  lisinopril (PRINIVIL,ZESTRIL) tablet 20 mg  Daily         12/14/22 0302    12/14/22 0900  megestrol (MEGACE) 40 MG/ML suspension 800 mg  Daily         12/14/22 0302 12/14/22 0900   polyethylene glycol (MIRALAX) packet 17 g  Daily         12/14/22 0302    12/14/22 0900  multivitamin (THERAGRAN) tablet 1 tablet  Daily         12/14/22 0012    12/14/22 0851  FL Video Swallow Single Contrast  1 Time Imaging,   Status:  Canceled         12/14/22 0850    12/14/22 0821  Adult Transthoracic Echo Complete W/ Cont if Necessary Per Protocol  Once         12/14/22 0821    12/14/22 0800  Oral Care  2 Times Daily       12/13/22 2323    12/14/22 0800  aPTT  Timed         12/14/22 0254    12/14/22 0700  pantoprazole (PROTONIX) EC tablet 40 mg  Every Morning         12/14/22 0302 12/14/22 0605  magic barrier cream 1 application  4 Times Daily PRN         12/14/22 0605 12/14/22 0600  CBC & Differential  Every Third Day,   Status:  Canceled      Comments: Discontinue After Heparin Stopped      12/13/22 2006 12/14/22 0600  CBC Auto Differential  PROCEDURE ONCE        Comments: Discontinue After Heparin Stopped      12/13/22 2205 12/14/22 0600  CBC & Differential  Morning Draw,   Status:  Canceled         12/13/22 2323 12/14/22 0600  Comprehensive Metabolic Panel  Morning Draw         12/13/22 2323 12/14/22 0600  Incentive Spirometry  Every 4 Hours While Awake       12/14/22 0012    12/14/22 0600  Vitamin B12  Morning Draw         12/14/22 0017    12/14/22 0600  Folate  Morning Draw         12/14/22 0017    12/14/22 0301  HYDROcodone-acetaminophen (NORCO) 5-325 MG per tablet 1 tablet  Every 8 Hours PRN         12/14/22 0302    12/14/22 0301  guaifenesin (ROBITUSSIN) 100 MG/5ML liquid 200 mg  4 Times Daily PRN         12/14/22 0302    12/14/22 0221  aPTT  Timed         12/13/22 2028 12/14/22 0121  Stage II Pressure Ulcer Care - Twice Daily  Every 12 Hours        Comments: - Gently Cleanse With Normal Saline  - Apply Skin Protective Barrier Cream Every 12 Hours    12/14/22 0120 12/14/22 0121  Stage II Pressure Ulcer Care - As Needed  Per Order Details        Comments: - Gently Cleanse With  Normal Saline  - Apply Skin Protective Barrier Cream As Needed After Cleansing    12/14/22 0120    12/14/22 0044  Pharmacy Consult  Continuous PRN         12/14/22 0045    12/14/22 0030  Inpatient Spiritual Care Consult  Once        Provider:  (Not yet assigned)    12/14/22 0030    12/14/22 0029  Inpatient Case Management  Consult  Once        Comments: When asking family on rather she could stay with someone when discharged, they replied that she would just go back to living at home alone and people could just check on her like they have been. Pt has bed sores   Provider:  (Not yet assigned)    12/14/22 0030    12/14/22 0015  sodium chloride 0.9 % flush 10 mL  Every 12 Hours Scheduled         12/13/22 2323 12/14/22 0015  dextrose (D5W) 5 % infusion  Continuous         12/13/22 2323 12/14/22 0013  Turn Patient  Now Then Every 2 Hours         12/14/22 0012    12/14/22 0013  SLP Consult: Eval & Treat Other; Reported dysphagia  Once         12/14/22 0012    12/14/22 0013  Nutrition Consult  Once        Provider:  (Not yet assigned)    12/14/22 0012    12/14/22 0012  PTH, Intact  Once         12/14/22 0012    12/14/22 0012  T3, Free  Once         12/14/22 0012    12/14/22 0012  Wound Ostomy Eval & Treat  Once         12/14/22 0012    12/14/22 0000  Vital Signs  Every 4 Hours      Comments: Per per hospital policy    12/13/22 2323    12/14/22 0000  apixaban (ELIQUIS) 5 MG tablet tablet  Every 12 Hours Scheduled         12/14/22 1258    12/14/22 0000  Ambulatory Referral to Home Health (Hospital)         12/14/22 1258    12/14/22 0000  Discharge Follow-up with PCP         12/14/22 1258    12/14/22 0000  Hospital Bed         12/14/22 1258    12/13/22 2324  Notify Physician (with Parameters)  Until Discontinued         12/13/22 2323 12/13/22 2324  Intake & Output  Every Shift       12/13/22 2323    12/13/22 2324  Weigh patient  Once         12/13/22 2323    12/13/22 2324  Oxygen Therapy- Nasal  Cannula; Titrate for SPO2: 90% - 95%  Continuous         12/13/22 2323 12/13/22 2324  Insert Peripheral IV  Once         12/13/22 2323 12/13/22 2324  Saline Lock & Maintain IV Access  Continuous,   Status:  Canceled         12/13/22 2323 12/13/22 2324  VTE Prophylaxis Not Indicated: Other: Patient Currently Anticoagulated / Receiving Prophylaxis  Once         12/13/22 2323 12/13/22 2324  Telemetry - Maintain IV Access  Continuous         12/13/22 2323 12/13/22 2324  Continuous Cardiac Monitoring  Continuous        Comments: Follow Standing Orders As Outlined in Process Instructions (Open Order Report to View Full Instructions)    12/13/22 2323 12/13/22 2324  May Be Off Telemetry for Tests  Continuous         12/13/22 2323 12/13/22 2324  Notify Provider if ACLS Protocol Activated  Until Discontinued         12/13/22 2323 12/13/22 2324  Turn Patient  Now Then Every 2 Hours         12/13/22 2323 12/13/22 2324  Diet: Regular/House Diet; Texture: Regular Texture (IDDSI 7); Fluid Consistency: Thin (IDDSI 0)  Diet Effective Now,   Status:  Canceled         12/13/22 2323 12/13/22 2324  Adult Transthoracic Echo Complete W/ Cont if Necessary Per Protocol  Once,   Status:  Canceled         12/13/22 2323 12/13/22 2323  sodium chloride 0.9 % flush 10 mL  As Needed         12/13/22 2323 12/13/22 2323  sodium chloride 0.9 % infusion 40 mL  As Needed         12/13/22 2323 12/13/22 2323  nitroglycerin (NITROSTAT) SL tablet 0.4 mg  Every 5 Minutes PRN         12/13/22 2323 12/13/22 2323  acetaminophen (TYLENOL) tablet 650 mg  Every 6 Hours PRN         12/13/22 2323 12/13/22 2323  Pharmacy Consult  Continuous PRN         12/13/22 2323 12/13/22 2211  Inpatient Admission  Once         12/13/22 2212 12/13/22 2211  Code Status and Medical Interventions:  Continuous         12/13/22 2212 12/13/22 2125  iopamidol (ISOVUE-370) 76 % injection 100 mL  Once in Imaging         12/13/22 2123     12/13/22 2008  heparin (porcine) 5000 UNIT/ML injection 2,200 Units  Once         12/13/22 2006 12/13/22 2007  heparin 28318 units/250 mL (100 units/mL) in 0.45 % NaCl infusion  Titrated,   Status:  Discontinued         12/13/22 2006 12/13/22 2006  Initiate & Follow Heparin Protocol  Continuous,   Status:  Canceled         12/13/22 2006 12/13/22 2006  Heparin Nomogram / Protocol Adjustment  Once,   Status:  Canceled         12/13/22 2006 12/13/22 2006  Notify Provider Platelet Count Less Than 99603  Until Discontinued,   Status:  Canceled         12/13/22 2006 12/13/22 2006  Notify Provider if PTT Not in Therapeutic Range After 24 Hours  Until Discontinued,   Status:  Canceled         12/13/22 2006 12/13/22 2006  Stop Infusion & Notify Provider if Bleeding Occurs  Until Discontinued,   Status:  Canceled         12/13/22 2006 12/13/22 2005  RN to Place STAT aPTT Order 6 Hours After Heparin Bolus & 6 Hours After Any Heparin Rate Change  As Needed,   Status:  Canceled       12/13/22 2006 12/13/22 2005  After 2 Consecutive Therapeutic aPTTs, Obtain aPTT Daily.  If a Rate Adjustment is Necessary, Resume Every 6 Hour aPTT Draws  As Needed,   Status:  Canceled       12/13/22 2006 12/13/22 2005  heparin (porcine) 5000 UNIT/ML injection 2,200 Units  As Needed,   Status:  Discontinued         12/13/22 2006 12/13/22 2005  heparin (porcine) 5000 UNIT/ML injection 1,100 Units  As Needed,   Status:  Discontinued         12/13/22 2006 12/13/22 1836  sodium chloride 0.9 % bolus 500 mL  Once         12/13/22 1834    12/13/22 1835  CT Angiogram Chest Pulmonary Embolism  1 Time Imaging         12/13/22 1834    12/13/22 1730  US Arterial Doppler Lower Extremity Bilateral  1 Time Imaging         12/13/22 1729    12/13/22 1729  US Venous Doppler Lower Extremity Bilateral (duplex)  1 Time Imaging         12/13/22 1729    12/13/22 1720  XR Chest 1 View  1 Time Imaging         12/13/22 1719    12/13/22  1720  Monitor Blood Pressure  Per Hospital Policy         12/13/22 1719    12/13/22 1720  Cardiac Monitoring  Continuous,   Status:  Canceled        Comments: Follow Standing Orders As Outlined in Process Instructions (Open Order Report to View Full Instructions)    12/13/22 1719 12/13/22 1720  Pulse Oximetry, Continuous  Continuous         12/13/22 1719 12/13/22 1720  Fall Precautions  Continuous         12/13/22 1719    12/13/22 1720  Insert Peripheral IV  Once        See Hyperspace for full Linked Orders Report.    12/13/22 1719    12/13/22 1719  sodium chloride 0.9 % flush 10 mL  As Needed        See Hyperspace for full Linked Orders Report.    12/13/22 1719 12/13/22 1719  COVID PRE-OP / PRE-PROCEDURE SCREENING ORDER (NO ISOLATION) - Swab, Nasopharynx  Once         12/13/22 1719    12/13/22 1719  CBC & Differential  Once         12/13/22 1719    12/13/22 1719  Comprehensive Metabolic Panel  Once         12/13/22 1719    12/13/22 1719  Protime-INR  Once         12/13/22 1719    12/13/22 1719  aPTT  Once         12/13/22 1719    12/13/22 1719  Urinalysis With Microscopic If Indicated (No Culture) - Urine, Clean Catch  Once         12/13/22 1719    12/13/22 1719  [place] Draw  Once         12/13/22 1719    12/13/22 1719  BNP  Once         12/13/22 1719    12/13/22 1719  Troponin  Now Then Every 2 Hours       12/13/22 1719    12/13/22 1719  C-reactive Protein  Once         12/13/22 1719    12/13/22 1719  TSH  Once         12/13/22 1719    12/13/22 1719  T4, Free  Once         12/13/22 1719    12/13/22 1719  Magnesium  Once         12/13/22 1719    12/13/22 1719  COVID-19 and FLU A/B PCR - Swab, Nasopharynx  PROCEDURE ONCE         12/13/22 1719    12/13/22 1719  CBC Auto Differential  PROCEDURE ONCE         12/13/22 1719    12/13/22 1719  Green Top (Gel)  PROCEDURE ONCE         12/13/22 1719    12/13/22 1719  Lavender Top  PROCEDURE ONCE         12/13/22 1719    12/13/22 1719  Gold Top - SST  PROCEDURE ONCE          12/13/22 1719    12/13/22 1719  Light Blue Top  PROCEDURE ONCE         12/13/22 1719    12/13/22 1711  ECG 12 Lead Tachycardia  Once         12/13/22 1710    Unscheduled  Telemetry - Pulse Oximetry  Continuous PRN      Comments: If Patient Develops Unresponsiveness, Acute Dyspnea, Cyanosis or Suspected Hypoxemia Start Continuous Pulse Ox Monitoring, Apply Oxygen & Notify Provider    12/13/22 2323    Unscheduled  Oxygen Therapy- Nasal Cannula; Titrate for SPO2: 90% - 95%  Continuous PRN      Comments: If Patient Develops Unresponsiveness, Acute Dyspnea, Cyanosis or Suspected Hypoxemia Start Continuous Pulse Ox Monitoring, Apply Oxygen & Notify Provider    12/13/22 2323    Unscheduled  ECG 12 Lead Other; Acute Chest Pain or Dysrhythmia  As Needed      Comments: Nurse to Release if Patient Expericences Acute Chest Pain or Dysrhythmias    12/13/22 2323    Unscheduled  Potassium  As Needed      Comments: For Ventricular Arrhythmias      12/13/22 2323    Unscheduled  Magnesium  As Needed      Comments: For Ventricular Arrhythmias      12/13/22 2323    Unscheduled  Troponin  As Needed      Comments: For Chest Pain      12/13/22 2323    Unscheduled  Blood Gas, Arterial -With Co-Ox Panel: Yes  As Needed      Comments: Draw for Acute Dyspnea, Cyanosis, Suspected Hypoxemia or UnresponsivenessNotify Provider of Results      12/13/22 2323    Unscheduled  Initiate ACLS Protocol For Life Threatening Dysrhythmias (If Appropriate for Patient)  As Needed       12/13/22 2323    --  lisinopril (PRINIVIL,ZESTRIL) 20 MG tablet  Daily         12/13/22 1957    --  amoxicillin (AMOXIL) 500 MG capsule  2 Times Daily,   Status:  Discontinued         12/13/22 1957    --  benzonatate (TESSALON) 100 MG capsule  3 Times Daily PRN,   Status:  Discontinued         12/13/22 1957    --  HYDROC APAP 5/325MG /5ML  Status:  Discontinued         12/13/22 1957    --  ondansetron ODT (ZOFRAN-ODT) 4 MG disintegrating tablet  Every 8 Hours PRN          12/13/22 1957    --  HM LIDOCAINE PATCH EX  Status:  Discontinued         12/13/22 1957    --  mupirocin (BACTROBAN) 2 % ointment  2 Times Daily,   Status:  Discontinued         12/13/22 1957    --  cefdinir (OMNICEF) 300 MG capsule  2 Times Daily,   Status:  Discontinued         12/13/22 1957    --  guaifenesin (ROBITUSSIN) 100 MG/5ML liquid  4 Times Daily PRN         12/13/22 1957    --  omeprazole (priLOSEC) 20 MG capsule  Daily         12/13/22 1957    --  Lidocaine 4 % patch  Every 12 Hours         12/13/22 2222    --  HYDROcodone-acetaminophen (NORCO) 5-325 MG per tablet  Every 8 Hours PRN         12/13/22 2222    --  polyethylene glycol (MIRALAX) 17 g packet  Daily         12/13/22 2228    --  SCANNED - TELEMETRY           12/13/22 0000                Physician Progress Notes (last 24 hours)  Notes from 12/13/22 1435 through 12/14/22 1435   No notes of this type exist for this encounter.         Consult Notes (last 24 hours)  Notes from 12/13/22 1435 through 12/14/22 1435   No notes of this type exist for this encounter.         Physical Therapy Notes (most recent note)    No notes exist for this encounter.         Occupational Therapy Notes (most recent note)    No notes exist for this encounter.         ADL Documentation (last day)     Date/Time Transferring Toileting Bathing Dressing Eating Communication Swallowing Change in Functional Status Since Onset of Current Illness/Injury Equipment Currently Used at Home    12/14/22 1125 -- -- -- -- -- -- -- -- hospital bed;walker, rolling;wheelchair;shower chair    12/14/22 1045 4 - completely dependent 4 - completely dependent 4 - completely dependent 4 - completely dependent 4 - completely dependent 2 - difficulty understanding and speaking (not related to language barrier) 2 - difficulty swallowing liquids/foods -- --    12/14/22 0101 4 - completely dependent 3 - assistive equipment and person 4 - completely dependent 4 - completely dependent 4 - completely  dependent 2 - difficulty speaking (not related to language barrier) 2 - difficulty swallowing foods -- --    12/13/22 2329 -- -- -- -- -- -- -- yes bp cuff;walker, standard;walker, rolling            Discharge Summary    No notes of this type exist for this encounter.         Discharge Order (From admission, onward)     Start     Ordered    12/14/22 1256  Discharge patient  Once        Expected Discharge Date: 12/14/22    Expected Discharge Time: Midday    Discharge Disposition: Home or Self Care    Physician of Record for Attribution - Please select from Treatment Team: ADITYA HAILE [[id number]]    Review needed by CMO to determine Physician of Record: No       Question Answer Comment   Physician of Record for Attribution - Please select from Treatment Team ADITYA HAILE    Review needed by CMO to determine Physician of Record No        12/14/22 9096

## 2022-12-14 NOTE — PHARMACY PATIENT ASSISTANCE
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 According to patient's insurance, patient's co-pay is $10 for either medication. Patient may fill the first two fills at any pharmacy, but then any subsequent fill must be done at [facility] or through mail order, per insurance. No further concerns at this time.    Thank you,    Mindy Solorio, Pharmacy Intern  12/14/22  13:52 EST

## 2022-12-14 NOTE — CASE MANAGEMENT/SOCIAL WORK
Discharge Planning Assessment   [place]     Patient Name: Ashlee Go  MRN: [id number]  Today's Date: 12/14/2022    Admit Date: 12/13/2022       Discharge Needs Assessment    No documentation.                Discharge Plan     Row Name 12/14/22 1419       Plan    Plan Pt to be dc'd home with MD referral for Hospital bed/gel pressure mattress pad, CM called pts room and  spoke with DtrIjeoma via phone who is at bedside, and made aware of MD referral. CM faxed MD referral to [facility] and spoke with Geeta.  [facility] bed recently but will be eligible for the gel pressure mattress pad. Geeta to contact dtIjeoma milton, [phone], for delivery arrangements to the home.      1441:Per [facility] , Gel pressure mattress pad  may have to be ordered , if out of stock, [facility] will update dtr accordingly. CM spoke with dtr via phone and made aware [facility] will contact her regarding gel pressure mattress, dtr reports that will be fine, pts current bed/mattress is in good working condition.              Continued Care and Services - Admitted Since 12/13/2022    Coordination has not been started for this encounter.     Selected Continued Care - Prior Encounters Includes continued care and service providers with selected services from prior encounters from 9/14/2022 to 12/14/2022    Discharged on 11/17/2022 Admission date: 11/16/2022 - Discharge disposition: Home or Self Care    Home Medical Care     Service Provider Selected Services Address Phone Fax Patient Preferred    [facility] ,  [facility] [address] [place] [address] [phone] --                    Expected Discharge Date and Time     Expected Discharge Date Expected Discharge Time    Dec 14, 2022          Demographic Summary    No documentation.                Functional Status    No documentation.                Psychosocial    No documentation.                 Abuse/Neglect    No documentation.                Legal    No documentation.                Substance Abuse    No documentation.                Patient Forms    No documentation.                   Romelia Adames RN

## 2022-12-14 NOTE — CASE MANAGEMENT/SOCIAL WORK
Discharge Planning Assessment  [facility]     Patient Name: Ashlee Go  MRN: [id number]  Today's Date: 12/14/2022    Admit Date: 12/13/2022    Plan: SS made referral to [facility] at [phone] and faxed referral to [phone].  Pt will need EMS to transport.     Discharge Plan     Row Name 12/14/22 1534       Plan    Plan SS made referral to [facility] at [phone] and faxed referral to [phone].  Pt will need EMS to transport.    Row Name 12/14/22 1436       Plan    Plan Per Geeta- [facility] , Gel pressure mattress pad  may have to be ordered , if out of stock, [facility] will update dtr accordingly. CM spoke with dtr via phone and made aware [facility] will contact her regarding gel pressure mattress, dtr reports that will be fine, pts current bed/mattress is in good working condition.    Row Name 12/14/22 6412       Plan    Plan Pt to be dc'd home with MD referral for Hospital bed/gel pressure mattress pad, CM called pts room and  spoke with Ijeoma Ding via phone who is at bedside, and made aware of MD referral. CM faxed MD referral to [facility] and spoke with Geeta.  Per [facility], pt has already been provided with hospital bed recently but will be eligible for the gel pressure mattress pad. Geeta to contact Ijeoma ding, [phone], for delivery arrangements to the home.                    Giana Marx, BSW

## 2022-12-14 NOTE — CASE MANAGEMENT/SOCIAL WORK
Discharge Planning Assessment   [place]     Patient Name: Ashlee Go  MRN: [id number]  Today's Date: 12/14/2022    Admit Date: 12/13/2022     Discharge Plan     Row Name 12/14/22 1651       Plan    Final Note SS discussed EMS transport with lead RNIsaak and he voices that [facility] is pending, however request [facility] to be contacted to see if they can transport pt before [facility]. SS contacted [facility] per [place] and transport was scheduled. SS notified lead Isaak ERNANDEZ and they will contact [facility] back to cancel transport if [facility] arrives first. No other needs identified.                  Selected Continued Care - Prior Encounters Includes continued care and service providers with selected services from prior encounters from 9/14/2022 to 12/14/2022    Discharged on 11/17/2022 Admission date: 11/16/2022 - Discharge disposition: Home or Self Care    Home Medical Care     Service Provider Selected Services Address Phone Fax Patient Preferred    [facility] ,  Home Rehabilitation [address] [place] [id number] [phone] --                    Expected Discharge Date and Time     Expected Discharge Date Expected Discharge Time    Dec 14, 2022         ARLENE Zavala

## 2022-12-14 NOTE — NURSING NOTE
Patient with stage 2 PI to coccyx.  1x1x0.1cm.  Mayra wound red and blanchable.  See attached flow sheet documentation.  Will treat with TheraHoney and cover with silicone border dressing.    Lumbar spine red but remains blanchable at this time.  PI prevention orders initiated.     12/14/22 1515   Wound 12/14/22 0032 medial sacral spine   Placement Date/Time: 12/14/22 0032   Present on Hospital Admission: Yes  Orientation: medial  Location: sacral spine   Pressure Injury Stage 2   Dressing Appearance intact   Closure None   Base red;moist;non-blanchable   Periwound intact;redness;blanchable   Periwound Temperature warm   Periwound Skin Turgor soft   Edges open   Wound Length (cm) 1 cm   Wound Width (cm) 1 cm   Wound Depth (cm) 0.1 cm   Wound Surface Area (cm^2) 1 cm^2   Wound Volume (cm^3) 0.1 cm^3   Drainage Amount none

## 2022-12-15 ENCOUNTER — TELEPHONE (OUTPATIENT)
Dept: TELEMETRY | Facility: HOSPITAL | Age: 87
End: 2022-12-15

## 2022-12-15 NOTE — CASE MANAGEMENT/SOCIAL WORK
Discharge Planning Assessment   [place]     Patient Name: Ashlee Go  MRN: [id number]  Today's Date: 12/15/2022    Admit Date: 12/13/2022    Plan:  made referral to [facility] at [phone] and faxed referral to [phone].  Pt will need EMS to transport.     Discharge Plan     Row Name 12/15/22 0927       Plan    Final Discharge Disposition Code 06 - home with home health care    Final Note Pt discharged home with [facility] at [phone]           MONICA MeyerW

## 2022-12-16 NOTE — PROGRESS NOTES
"Enter Query Response Below      Query Response: -Stage 2 pressure injury present on admission    Electronically signed by Katharina Hernandez DO, 12/15/22, 7:07 PM EST.              If applicable, please update the problem list.      Patient: Ashlee Go        : 1925  Account: [id number]           Admit Date: 2022        How to Respond to this query:       a. Click New Note     b. Answer query within the yellow box.                c. Update the Problem List, if applicable.    Dr. Hernandez,    Patient presents with bilateral lower extremity swelling. Per the Emergency Department \"The patient is currently moaning in the family reports it is because her bottom is hurting from some pressure wounds.\" Wound Care Nurse saw the patient on 22 and documents \"Patient with stage 2 PI to coccyx\". Patient treated with \"TheraHoney and cover with silicone border dressing.\" per Wound Care nurse.    After further review  -Stage 2 pressure injury present on admission  -Stage 2 pressure injury not present on admission  -Other-specify _________  -Unable to determine    By submitting this query, we are merely seeking further clarification of documentation to accurately reflect all conditions that you are monitoring, evaluating, treating or that extend the hospitalization or utilize additional resources of care. Please utilize your independent clinical judgment when addressing the question(s) above.     This query and your response, once completed, will be entered into the legal medical record.    Sincerely,  Leela Roper RN, BSN  [email or web address]  Clinical Documentation Integrity Program   "

## 2022-12-26 ENCOUNTER — LAB REQUISITION (OUTPATIENT)
Dept: LAB | Facility: HOSPITAL | Age: 87
End: 2022-12-26

## 2022-12-26 DIAGNOSIS — I10 ESSENTIAL (PRIMARY) HYPERTENSION: ICD-10-CM

## 2022-12-26 DIAGNOSIS — E44.0 MODERATE PROTEIN-CALORIE MALNUTRITION: ICD-10-CM

## 2022-12-26 DIAGNOSIS — J44.1 CHRONIC OBSTRUCTIVE PULMONARY DISEASE WITH (ACUTE) EXACERBATION: ICD-10-CM

## 2022-12-26 DIAGNOSIS — I26.99 OTHER PULMONARY EMBOLISM WITHOUT ACUTE COR PULMONALE: ICD-10-CM

## 2022-12-26 LAB
ALBUMIN SERPL-MCNC: 2.61 G/DL (ref 3.5–5.2)
ALBUMIN/GLOB SERPL: 0.9 G/DL
ALP SERPL-CCNC: 68 U/L (ref 39–117)
ALT SERPL W P-5'-P-CCNC: 7 U/L (ref 1–33)
ANION GAP SERPL CALCULATED.3IONS-SCNC: 15.1 MMOL/L (ref 5–15)
AST SERPL-CCNC: 23 U/L (ref 1–32)
BASOPHILS # BLD AUTO: 0.06 10*3/MM3 (ref 0–0.2)
BASOPHILS NFR BLD AUTO: 0.7 % (ref 0–1.5)
BILIRUB SERPL-MCNC: 0.4 MG/DL (ref 0–1.2)
BUN SERPL-MCNC: 36 MG/DL (ref 8–23)
BUN/CREAT SERPL: 41.4 (ref 7–25)
CALCIUM SPEC-SCNC: 9.4 MG/DL (ref 8.2–9.6)
CHLORIDE SERPL-SCNC: 122 MMOL/L (ref 98–107)
CO2 SERPL-SCNC: 17.9 MMOL/L (ref 22–29)
CREAT SERPL-MCNC: 0.87 MG/DL (ref 0.57–1)
DEPRECATED RDW RBC AUTO: 63.8 FL (ref 37–54)
EGFRCR SERPLBLD CKD-EPI 2021: 60.7 ML/MIN/1.73
EOSINOPHIL # BLD AUTO: 0.46 10*3/MM3 (ref 0–0.4)
EOSINOPHIL NFR BLD AUTO: 5.3 % (ref 0.3–6.2)
ERYTHROCYTE [DISTWIDTH] IN BLOOD BY AUTOMATED COUNT: 17.3 % (ref 12.3–15.4)
GLOBULIN UR ELPH-MCNC: 3 GM/DL
GLUCOSE SERPL-MCNC: 84 MG/DL (ref 65–99)
HCT VFR BLD AUTO: 38.7 % (ref 34–46.6)
HGB BLD-MCNC: 11.7 G/DL (ref 12–15.9)
IMM GRANULOCYTES # BLD AUTO: 0.03 10*3/MM3 (ref 0–0.05)
IMM GRANULOCYTES NFR BLD AUTO: 0.3 % (ref 0–0.5)
LYMPHOCYTES # BLD AUTO: 1.29 10*3/MM3 (ref 0.7–3.1)
LYMPHOCYTES NFR BLD AUTO: 14.7 % (ref 19.6–45.3)
MCH RBC QN AUTO: 30.8 PG (ref 26.6–33)
MCHC RBC AUTO-ENTMCNC: 30.2 G/DL (ref 31.5–35.7)
MCV RBC AUTO: 101.8 FL (ref 79–97)
MONOCYTES # BLD AUTO: 0.52 10*3/MM3 (ref 0.1–0.9)
MONOCYTES NFR BLD AUTO: 5.9 % (ref 5–12)
NEUTROPHILS NFR BLD AUTO: 6.4 10*3/MM3 (ref 1.7–7)
NEUTROPHILS NFR BLD AUTO: 73.1 % (ref 42.7–76)
NRBC BLD AUTO-RTO: 0 /100 WBC (ref 0–0.2)
PLATELET # BLD AUTO: 208 10*3/MM3 (ref 140–450)
PMV BLD AUTO: 11.5 FL (ref 6–12)
POTASSIUM SERPL-SCNC: 5.7 MMOL/L (ref 3.5–5.2)
PROT SERPL-MCNC: 5.6 G/DL (ref 6–8.5)
RBC # BLD AUTO: 3.8 10*6/MM3 (ref 3.77–5.28)
SODIUM SERPL-SCNC: 155 MMOL/L (ref 136–145)
WBC NRBC COR # BLD: 8.76 10*3/MM3 (ref 3.4–10.8)

## 2022-12-26 PROCEDURE — 80053 COMPREHEN METABOLIC PANEL: CPT | Performed by: NURSE PRACTITIONER

## 2022-12-26 PROCEDURE — 85025 COMPLETE CBC W/AUTO DIFF WBC: CPT | Performed by: NURSE PRACTITIONER
